# Patient Record
Sex: FEMALE | Race: WHITE | NOT HISPANIC OR LATINO | Employment: OTHER | URBAN - METROPOLITAN AREA
[De-identification: names, ages, dates, MRNs, and addresses within clinical notes are randomized per-mention and may not be internally consistent; named-entity substitution may affect disease eponyms.]

---

## 2024-01-01 ENCOUNTER — HOSPITAL ENCOUNTER (INPATIENT)
Facility: HOSPITAL | Age: 74
LOS: 7 days | End: 2024-12-27
Attending: INTERNAL MEDICINE | Admitting: INTERNAL MEDICINE
Payer: COMMERCIAL

## 2024-01-01 VITALS — OXYGEN SATURATION: 52 % | DIASTOLIC BLOOD PRESSURE: 84 MMHG | TEMPERATURE: 103.5 F | SYSTOLIC BLOOD PRESSURE: 125 MMHG

## 2024-01-01 PROCEDURE — 25010000002 LORAZEPAM PER 2 MG: Performed by: STUDENT IN AN ORGANIZED HEALTH CARE EDUCATION/TRAINING PROGRAM

## 2024-01-01 PROCEDURE — 25010000002 LORAZEPAM PER 2 MG: Performed by: HOSPITALIST

## 2024-01-01 PROCEDURE — 25010000002 HYDROMORPHONE PER 4 MG: Performed by: HOSPITALIST

## 2024-01-01 PROCEDURE — 25010000002 GLYCOPYRROLATE 0.2 MG/ML SOLUTION: Performed by: INTERNAL MEDICINE

## 2024-01-01 PROCEDURE — 25010000002 HYDROMORPHONE 1 MG/ML SOLUTION: Performed by: HOSPITALIST

## 2024-01-01 PROCEDURE — 25010000002 LORAZEPAM PER 2 MG: Performed by: INTERNAL MEDICINE

## 2024-01-01 PROCEDURE — 25010000002 MORPHINE PER 10 MG: Performed by: HOSPITALIST

## 2024-01-01 PROCEDURE — 99222 1ST HOSP IP/OBS MODERATE 55: CPT | Performed by: INTERNAL MEDICINE

## 2024-01-01 PROCEDURE — 25010000002 MORPHINE PER 10 MG: Performed by: INTERNAL MEDICINE

## 2024-01-01 PROCEDURE — 99239 HOSP IP/OBS DSCHRG MGMT >30: CPT | Performed by: INTERNAL MEDICINE

## 2024-01-01 PROCEDURE — 25010000002 HYDROMORPHONE PER 4 MG: Performed by: INTERNAL MEDICINE

## 2024-01-01 RX ORDER — MORPHINE SULFATE 20 MG/ML
10 SOLUTION ORAL
Status: DISCONTINUED | OUTPATIENT
Start: 2024-01-01 | End: 2024-01-01 | Stop reason: HOSPADM

## 2024-01-01 RX ORDER — GLYCOPYRROLATE 0.2 MG/ML
0.4 INJECTION INTRAMUSCULAR; INTRAVENOUS
Status: DISCONTINUED | OUTPATIENT
Start: 2024-01-01 | End: 2024-01-01 | Stop reason: HOSPADM

## 2024-01-01 RX ORDER — MORPHINE SULFATE 20 MG/ML
20 SOLUTION ORAL
Status: DISCONTINUED | OUTPATIENT
Start: 2024-01-01 | End: 2024-01-01 | Stop reason: HOSPADM

## 2024-01-01 RX ORDER — ACETAMINOPHEN 325 MG/1
650 TABLET ORAL EVERY 4 HOURS PRN
Status: DISCONTINUED | OUTPATIENT
Start: 2024-01-01 | End: 2024-01-01

## 2024-01-01 RX ORDER — LORAZEPAM 2 MG/ML
1 INJECTION INTRAMUSCULAR
Status: DISCONTINUED | OUTPATIENT
Start: 2024-01-01 | End: 2024-01-01

## 2024-01-01 RX ORDER — LORAZEPAM 2 MG/ML
1 CONCENTRATE ORAL
Status: ACTIVE | OUTPATIENT
Start: 2024-01-01 | End: 2024-01-01

## 2024-01-01 RX ORDER — MORPHINE SULFATE 4 MG/ML
4 INJECTION, SOLUTION INTRAMUSCULAR; INTRAVENOUS
Status: DISCONTINUED | OUTPATIENT
Start: 2024-01-01 | End: 2024-01-01

## 2024-01-01 RX ORDER — MORPHINE SULFATE 20 MG/ML
5 SOLUTION ORAL
Status: DISCONTINUED | OUTPATIENT
Start: 2024-01-01 | End: 2024-01-01

## 2024-01-01 RX ORDER — LORAZEPAM 2 MG/ML
2 CONCENTRATE ORAL
Status: DISCONTINUED | OUTPATIENT
Start: 2024-01-01 | End: 2024-01-01

## 2024-01-01 RX ORDER — ACETAMINOPHEN 650 MG/1
650 SUPPOSITORY RECTAL EVERY 4 HOURS PRN
Status: DISCONTINUED | OUTPATIENT
Start: 2024-01-01 | End: 2024-01-01 | Stop reason: HOSPADM

## 2024-01-01 RX ORDER — PROMETHAZINE HYDROCHLORIDE 25 MG/1
12.5 TABLET ORAL EVERY 4 HOURS PRN
Status: DISCONTINUED | OUTPATIENT
Start: 2024-01-01 | End: 2024-01-01

## 2024-01-01 RX ORDER — LORAZEPAM 2 MG/ML
2 CONCENTRATE ORAL
Status: DISCONTINUED | OUTPATIENT
Start: 2024-01-01 | End: 2024-01-01 | Stop reason: HOSPADM

## 2024-01-01 RX ORDER — QUETIAPINE FUMARATE 50 MG/1
200 TABLET, FILM COATED ORAL 2 TIMES DAILY
Status: DISCONTINUED | OUTPATIENT
Start: 2024-01-01 | End: 2024-01-01

## 2024-01-01 RX ORDER — MORPHINE SULFATE 20 MG/ML
5 SOLUTION ORAL
Status: DISCONTINUED | OUTPATIENT
Start: 2024-01-01 | End: 2024-01-01 | Stop reason: HOSPADM

## 2024-01-01 RX ORDER — MORPHINE SULFATE 2 MG/ML
2 INJECTION, SOLUTION INTRAMUSCULAR; INTRAVENOUS
Status: DISCONTINUED | OUTPATIENT
Start: 2024-01-01 | End: 2024-01-01

## 2024-01-01 RX ORDER — GLYCOPYRROLATE 0.2 MG/ML
0.4 INJECTION INTRAMUSCULAR; INTRAVENOUS
Status: DISCONTINUED | OUTPATIENT
Start: 2024-01-01 | End: 2024-01-01

## 2024-01-01 RX ORDER — MORPHINE SULFATE 20 MG/ML
20 SOLUTION ORAL
Status: DISCONTINUED | OUTPATIENT
Start: 2024-01-01 | End: 2024-01-01

## 2024-01-01 RX ORDER — LORAZEPAM 2 MG/ML
0.5 CONCENTRATE ORAL
Status: ACTIVE | OUTPATIENT
Start: 2024-01-01 | End: 2024-01-01

## 2024-01-01 RX ORDER — LORAZEPAM 2 MG/ML
2 INJECTION INTRAMUSCULAR
Status: ACTIVE | OUTPATIENT
Start: 2024-01-01 | End: 2024-01-01

## 2024-01-01 RX ORDER — HYDROMORPHONE HYDROCHLORIDE 2 MG/ML
2 INJECTION, SOLUTION INTRAMUSCULAR; INTRAVENOUS; SUBCUTANEOUS
Status: DISCONTINUED | OUTPATIENT
Start: 2024-01-01 | End: 2024-01-01

## 2024-01-01 RX ORDER — HYDROMORPHONE HYDROCHLORIDE 2 MG/ML
1.5 INJECTION, SOLUTION INTRAMUSCULAR; INTRAVENOUS; SUBCUTANEOUS
Status: DISCONTINUED | OUTPATIENT
Start: 2024-01-01 | End: 2024-01-01 | Stop reason: HOSPADM

## 2024-01-01 RX ORDER — LORAZEPAM 2 MG/ML
0.5 CONCENTRATE ORAL
Status: DISCONTINUED | OUTPATIENT
Start: 2024-01-01 | End: 2024-01-01

## 2024-01-01 RX ORDER — LORAZEPAM 2 MG/ML
1 CONCENTRATE ORAL
Status: DISCONTINUED | OUTPATIENT
Start: 2024-01-01 | End: 2024-01-01

## 2024-01-01 RX ORDER — LORAZEPAM 2 MG/ML
2 INJECTION INTRAMUSCULAR
Status: DISCONTINUED | OUTPATIENT
Start: 2024-01-01 | End: 2024-01-01 | Stop reason: HOSPADM

## 2024-01-01 RX ORDER — KETOROLAC TROMETHAMINE 15 MG/ML
15 INJECTION, SOLUTION INTRAMUSCULAR; INTRAVENOUS EVERY 6 HOURS PRN
Status: ACTIVE | OUTPATIENT
Start: 2024-01-01 | End: 2024-01-01

## 2024-01-01 RX ORDER — MORPHINE SULFATE 20 MG/ML
10 SOLUTION ORAL
Status: DISCONTINUED | OUTPATIENT
Start: 2024-01-01 | End: 2024-01-01

## 2024-01-01 RX ORDER — PROMETHAZINE HYDROCHLORIDE 12.5 MG/1
12.5 SUPPOSITORY RECTAL EVERY 4 HOURS PRN
Status: DISCONTINUED | OUTPATIENT
Start: 2024-01-01 | End: 2024-01-01

## 2024-01-01 RX ORDER — GLYCOPYRROLATE 0.2 MG/ML
0.2 INJECTION INTRAMUSCULAR; INTRAVENOUS
Status: DISCONTINUED | OUTPATIENT
Start: 2024-01-01 | End: 2024-01-01

## 2024-01-01 RX ORDER — LORAZEPAM 2 MG/ML
2 CONCENTRATE ORAL
Status: ACTIVE | OUTPATIENT
Start: 2024-01-01 | End: 2024-01-01

## 2024-01-01 RX ORDER — LORAZEPAM 2 MG/ML
2 INJECTION INTRAMUSCULAR
Status: DISCONTINUED | OUTPATIENT
Start: 2024-01-01 | End: 2024-01-01

## 2024-01-01 RX ORDER — GLYCOPYRROLATE 0.2 MG/ML
0.2 INJECTION INTRAMUSCULAR; INTRAVENOUS
Status: DISCONTINUED | OUTPATIENT
Start: 2024-01-01 | End: 2024-01-01 | Stop reason: HOSPADM

## 2024-01-01 RX ORDER — LORAZEPAM 2 MG/ML
0.5 INJECTION INTRAMUSCULAR
Status: DISPENSED | OUTPATIENT
Start: 2024-01-01 | End: 2024-01-01

## 2024-01-01 RX ORDER — MORPHINE SULFATE 10 MG/ML
6 INJECTION INTRAMUSCULAR; INTRAVENOUS; SUBCUTANEOUS
Status: DISCONTINUED | OUTPATIENT
Start: 2024-01-01 | End: 2024-01-01

## 2024-01-01 RX ORDER — HYDROMORPHONE HYDROCHLORIDE 1 MG/ML
0.5 INJECTION, SOLUTION INTRAMUSCULAR; INTRAVENOUS; SUBCUTANEOUS
Status: DISCONTINUED | OUTPATIENT
Start: 2024-01-01 | End: 2024-01-01

## 2024-01-01 RX ORDER — HYDROMORPHONE HYDROCHLORIDE 1 MG/ML
0.5 INJECTION, SOLUTION INTRAMUSCULAR; INTRAVENOUS; SUBCUTANEOUS
Status: DISCONTINUED | OUTPATIENT
Start: 2024-01-01 | End: 2024-01-01 | Stop reason: HOSPADM

## 2024-01-01 RX ORDER — DIPHENHYDRAMINE HYDROCHLORIDE 50 MG/ML
25 INJECTION INTRAMUSCULAR; INTRAVENOUS EVERY 6 HOURS PRN
Status: DISCONTINUED | OUTPATIENT
Start: 2024-01-01 | End: 2024-01-01

## 2024-01-01 RX ORDER — LORAZEPAM 2 MG/ML
0.5 INJECTION INTRAMUSCULAR
Status: DISCONTINUED | OUTPATIENT
Start: 2024-01-01 | End: 2024-01-01

## 2024-01-01 RX ORDER — HYDROMORPHONE HYDROCHLORIDE 2 MG/ML
1.5 INJECTION, SOLUTION INTRAMUSCULAR; INTRAVENOUS; SUBCUTANEOUS
Status: DISCONTINUED | OUTPATIENT
Start: 2024-01-01 | End: 2024-01-01

## 2024-01-01 RX ORDER — LORAZEPAM 2 MG/ML
0.5 CONCENTRATE ORAL
Status: DISCONTINUED | OUTPATIENT
Start: 2024-01-01 | End: 2024-01-01 | Stop reason: HOSPADM

## 2024-01-01 RX ORDER — DIPHENHYDRAMINE HCL 25 MG
25 CAPSULE ORAL EVERY 6 HOURS PRN
Status: DISCONTINUED | OUTPATIENT
Start: 2024-01-01 | End: 2024-01-01

## 2024-01-01 RX ORDER — LORAZEPAM 2 MG/ML
1 CONCENTRATE ORAL
Status: DISCONTINUED | OUTPATIENT
Start: 2024-01-01 | End: 2024-01-01 | Stop reason: HOSPADM

## 2024-01-01 RX ORDER — LORAZEPAM 2 MG/ML
1 INJECTION INTRAMUSCULAR
Status: DISCONTINUED | OUTPATIENT
Start: 2024-01-01 | End: 2024-01-01 | Stop reason: HOSPADM

## 2024-01-01 RX ORDER — LORAZEPAM 2 MG/ML
1 INJECTION INTRAMUSCULAR
Status: DISPENSED | OUTPATIENT
Start: 2024-01-01 | End: 2024-01-01

## 2024-01-01 RX ORDER — LIDOCAINE HYDROCHLORIDE 20 MG/ML
5 SOLUTION OROPHARYNGEAL EVERY 4 HOURS PRN
Status: DISCONTINUED | OUTPATIENT
Start: 2024-01-01 | End: 2024-01-01 | Stop reason: HOSPADM

## 2024-01-01 RX ORDER — SCOLOPAMINE TRANSDERMAL SYSTEM 1 MG/1
1 PATCH, EXTENDED RELEASE TRANSDERMAL
Status: DISCONTINUED | OUTPATIENT
Start: 2024-01-01 | End: 2024-01-01 | Stop reason: HOSPADM

## 2024-01-01 RX ORDER — LORAZEPAM 2 MG/ML
0.5 INJECTION INTRAMUSCULAR
Status: DISCONTINUED | OUTPATIENT
Start: 2024-01-01 | End: 2024-01-01 | Stop reason: HOSPADM

## 2024-01-01 RX ORDER — ACETAMINOPHEN 160 MG/5ML
650 SOLUTION ORAL EVERY 4 HOURS PRN
Status: DISCONTINUED | OUTPATIENT
Start: 2024-01-01 | End: 2024-01-01

## 2024-01-01 RX ORDER — DIPHENOXYLATE HYDROCHLORIDE AND ATROPINE SULFATE 2.5; .025 MG/1; MG/1
1 TABLET ORAL
Status: DISCONTINUED | OUTPATIENT
Start: 2024-01-01 | End: 2024-01-01

## 2024-01-01 RX ORDER — LORAZEPAM 2 MG/ML
1 INJECTION INTRAMUSCULAR
Status: ACTIVE | OUTPATIENT
Start: 2024-01-01 | End: 2024-01-01

## 2024-01-01 RX ORDER — LORAZEPAM 2 MG/ML
0.5 INJECTION INTRAMUSCULAR
Status: ACTIVE | OUTPATIENT
Start: 2024-01-01 | End: 2024-01-01

## 2024-01-01 RX ADMIN — LORAZEPAM 1 MG: 2 INJECTION INTRAMUSCULAR; INTRAVENOUS at 08:33

## 2024-01-01 RX ADMIN — HYDROMORPHONE HYDROCHLORIDE 1 MG: 1 INJECTION, SOLUTION INTRAMUSCULAR; INTRAVENOUS; SUBCUTANEOUS at 04:53

## 2024-01-01 RX ADMIN — LORAZEPAM 2 MG: 2 INJECTION INTRAMUSCULAR; INTRAVENOUS at 09:36

## 2024-01-01 RX ADMIN — LORAZEPAM 2 MG: 2 INJECTION INTRAMUSCULAR; INTRAVENOUS at 01:25

## 2024-01-01 RX ADMIN — LORAZEPAM 2 MG: 2 INJECTION INTRAMUSCULAR; INTRAVENOUS at 13:52

## 2024-01-01 RX ADMIN — LORAZEPAM 2 MG: 2 INJECTION INTRAMUSCULAR; INTRAVENOUS at 20:30

## 2024-01-01 RX ADMIN — LORAZEPAM 2 MG: 2 INJECTION INTRAMUSCULAR; INTRAVENOUS at 09:40

## 2024-01-01 RX ADMIN — HYDROMORPHONE HYDROCHLORIDE 1.5 MG: 2 INJECTION, SOLUTION INTRAMUSCULAR; INTRAVENOUS; SUBCUTANEOUS at 15:17

## 2024-01-01 RX ADMIN — LORAZEPAM 0.5 MG: 2 INJECTION INTRAMUSCULAR; INTRAVENOUS at 00:50

## 2024-01-01 RX ADMIN — LORAZEPAM 2 MG: 2 INJECTION INTRAMUSCULAR; INTRAVENOUS at 08:33

## 2024-01-01 RX ADMIN — LORAZEPAM 2 MG: 2 INJECTION INTRAMUSCULAR; INTRAVENOUS at 12:46

## 2024-01-01 RX ADMIN — HYDROMORPHONE HYDROCHLORIDE 0.5 MG: 1 INJECTION, SOLUTION INTRAMUSCULAR; INTRAVENOUS; SUBCUTANEOUS at 09:39

## 2024-01-01 RX ADMIN — LORAZEPAM 1 MG: 2 INJECTION INTRAMUSCULAR; INTRAVENOUS at 04:14

## 2024-01-01 RX ADMIN — LORAZEPAM 1 MG: 2 INJECTION INTRAMUSCULAR; INTRAVENOUS at 13:24

## 2024-01-01 RX ADMIN — MORPHINE SULFATE 4 MG: 4 INJECTION, SOLUTION INTRAMUSCULAR; INTRAVENOUS at 13:24

## 2024-01-01 RX ADMIN — MORPHINE SULFATE 4 MG: 4 INJECTION, SOLUTION INTRAMUSCULAR; INTRAVENOUS at 20:51

## 2024-01-01 RX ADMIN — LORAZEPAM 1 MG: 2 INJECTION INTRAMUSCULAR; INTRAVENOUS at 00:35

## 2024-01-01 RX ADMIN — HYDROMORPHONE HYDROCHLORIDE 1.5 MG: 2 INJECTION, SOLUTION INTRAMUSCULAR; INTRAVENOUS; SUBCUTANEOUS at 13:52

## 2024-01-01 RX ADMIN — LORAZEPAM 2 MG: 2 INJECTION INTRAMUSCULAR; INTRAVENOUS at 01:08

## 2024-01-01 RX ADMIN — HYDROMORPHONE HYDROCHLORIDE 1 MG: 1 INJECTION, SOLUTION INTRAMUSCULAR; INTRAVENOUS; SUBCUTANEOUS at 13:10

## 2024-01-01 RX ADMIN — HYDROMORPHONE HYDROCHLORIDE 0.5 MG: 1 INJECTION, SOLUTION INTRAMUSCULAR; INTRAVENOUS; SUBCUTANEOUS at 09:36

## 2024-01-01 RX ADMIN — LORAZEPAM 2 MG: 2 INJECTION INTRAMUSCULAR; INTRAVENOUS at 09:39

## 2024-01-01 RX ADMIN — LORAZEPAM 0.5 MG: 2 INJECTION INTRAMUSCULAR; INTRAVENOUS at 17:49

## 2024-01-01 RX ADMIN — LORAZEPAM 2 MG: 2 INJECTION INTRAMUSCULAR; INTRAVENOUS at 13:09

## 2024-01-01 RX ADMIN — MORPHINE SULFATE 4 MG: 4 INJECTION, SOLUTION INTRAMUSCULAR; INTRAVENOUS at 04:14

## 2024-01-01 RX ADMIN — LORAZEPAM 2 MG: 2 INJECTION INTRAMUSCULAR; INTRAVENOUS at 15:18

## 2024-01-01 RX ADMIN — LORAZEPAM 2 MG: 2 INJECTION INTRAMUSCULAR; INTRAVENOUS at 04:54

## 2024-01-01 RX ADMIN — LORAZEPAM 0.5 MG: 2 INJECTION INTRAMUSCULAR; INTRAVENOUS at 20:43

## 2024-01-01 RX ADMIN — HYDROMORPHONE HYDROCHLORIDE 1 MG: 1 INJECTION, SOLUTION INTRAMUSCULAR; INTRAVENOUS; SUBCUTANEOUS at 09:04

## 2024-01-01 RX ADMIN — HYDROMORPHONE HYDROCHLORIDE 0.5 MG: 1 INJECTION, SOLUTION INTRAMUSCULAR; INTRAVENOUS; SUBCUTANEOUS at 04:55

## 2024-01-01 RX ADMIN — HYDROMORPHONE HYDROCHLORIDE 1 MG: 1 INJECTION, SOLUTION INTRAMUSCULAR; INTRAVENOUS; SUBCUTANEOUS at 12:25

## 2024-01-01 RX ADMIN — HYDROMORPHONE HYDROCHLORIDE 1 MG: 1 INJECTION, SOLUTION INTRAMUSCULAR; INTRAVENOUS; SUBCUTANEOUS at 01:24

## 2024-01-01 RX ADMIN — HYDROMORPHONE HYDROCHLORIDE 0.5 MG: 1 INJECTION, SOLUTION INTRAMUSCULAR; INTRAVENOUS; SUBCUTANEOUS at 16:45

## 2024-01-01 RX ADMIN — HYDROMORPHONE HYDROCHLORIDE 1 MG: 1 INJECTION, SOLUTION INTRAMUSCULAR; INTRAVENOUS; SUBCUTANEOUS at 00:22

## 2024-01-01 RX ADMIN — LORAZEPAM 2 MG: 2 INJECTION INTRAMUSCULAR; INTRAVENOUS at 06:23

## 2024-01-01 RX ADMIN — MORPHINE SULFATE 4 MG: 4 INJECTION, SOLUTION INTRAMUSCULAR; INTRAVENOUS at 17:43

## 2024-01-01 RX ADMIN — HYDROMORPHONE HYDROCHLORIDE 1 MG: 1 INJECTION, SOLUTION INTRAMUSCULAR; INTRAVENOUS; SUBCUTANEOUS at 16:54

## 2024-01-01 RX ADMIN — LORAZEPAM 2 MG: 2 INJECTION INTRAMUSCULAR; INTRAVENOUS at 00:22

## 2024-01-01 RX ADMIN — LORAZEPAM 2 MG: 2 INJECTION INTRAMUSCULAR; INTRAVENOUS at 12:39

## 2024-01-01 RX ADMIN — HYDROMORPHONE HYDROCHLORIDE 1 MG: 1 INJECTION, SOLUTION INTRAMUSCULAR; INTRAVENOUS; SUBCUTANEOUS at 01:08

## 2024-01-01 RX ADMIN — MORPHINE SULFATE 2 MG: 2 INJECTION, SOLUTION INTRAMUSCULAR; INTRAVENOUS at 17:49

## 2024-01-01 RX ADMIN — HYDROMORPHONE HYDROCHLORIDE 0.5 MG: 1 INJECTION, SOLUTION INTRAMUSCULAR; INTRAVENOUS; SUBCUTANEOUS at 12:39

## 2024-01-01 RX ADMIN — HYDROMORPHONE HYDROCHLORIDE 1 MG: 1 INJECTION, SOLUTION INTRAMUSCULAR; INTRAVENOUS; SUBCUTANEOUS at 20:46

## 2024-01-01 RX ADMIN — HYDROMORPHONE HYDROCHLORIDE 1 MG: 1 INJECTION, SOLUTION INTRAMUSCULAR; INTRAVENOUS; SUBCUTANEOUS at 05:06

## 2024-01-01 RX ADMIN — MORPHINE SULFATE 4 MG: 4 INJECTION, SOLUTION INTRAMUSCULAR; INTRAVENOUS at 08:53

## 2024-01-01 RX ADMIN — LORAZEPAM 2 MG: 2 INJECTION INTRAMUSCULAR; INTRAVENOUS at 17:43

## 2024-01-01 RX ADMIN — LORAZEPAM 2 MG: 2 INJECTION INTRAMUSCULAR; INTRAVENOUS at 16:45

## 2024-01-01 RX ADMIN — LORAZEPAM 2 MG: 2 INJECTION INTRAMUSCULAR; INTRAVENOUS at 09:03

## 2024-01-01 RX ADMIN — HYDROMORPHONE HYDROCHLORIDE 1 MG: 1 INJECTION, SOLUTION INTRAMUSCULAR; INTRAVENOUS; SUBCUTANEOUS at 09:39

## 2024-01-01 RX ADMIN — LORAZEPAM 1 MG: 2 INJECTION INTRAMUSCULAR; INTRAVENOUS at 17:43

## 2024-01-01 RX ADMIN — LORAZEPAM 2 MG: 2 INJECTION INTRAMUSCULAR; INTRAVENOUS at 00:55

## 2024-01-01 RX ADMIN — LORAZEPAM 2 MG: 2 INJECTION INTRAMUSCULAR; INTRAVENOUS at 05:05

## 2024-01-01 RX ADMIN — HYDROMORPHONE HYDROCHLORIDE 0.5 MG: 1 INJECTION, SOLUTION INTRAMUSCULAR; INTRAVENOUS; SUBCUTANEOUS at 18:26

## 2024-01-01 RX ADMIN — HYDROMORPHONE HYDROCHLORIDE 0.5 MG: 1 INJECTION, SOLUTION INTRAMUSCULAR; INTRAVENOUS; SUBCUTANEOUS at 00:55

## 2024-01-01 RX ADMIN — MORPHINE SULFATE 4 MG: 4 INJECTION, SOLUTION INTRAMUSCULAR; INTRAVENOUS at 12:35

## 2024-01-01 RX ADMIN — LORAZEPAM 1 MG: 2 INJECTION INTRAMUSCULAR; INTRAVENOUS at 05:23

## 2024-01-01 RX ADMIN — LORAZEPAM 2 MG: 2 INJECTION INTRAMUSCULAR; INTRAVENOUS at 00:45

## 2024-01-01 RX ADMIN — HYDROMORPHONE HYDROCHLORIDE 0.5 MG: 1 INJECTION, SOLUTION INTRAMUSCULAR; INTRAVENOUS; SUBCUTANEOUS at 00:45

## 2024-01-01 RX ADMIN — HYDROMORPHONE HYDROCHLORIDE 1 MG: 1 INJECTION, SOLUTION INTRAMUSCULAR; INTRAVENOUS; SUBCUTANEOUS at 08:33

## 2024-01-01 RX ADMIN — HYDROMORPHONE HYDROCHLORIDE 1 MG: 1 INJECTION, SOLUTION INTRAMUSCULAR; INTRAVENOUS; SUBCUTANEOUS at 12:46

## 2024-01-01 RX ADMIN — GLYCOPYRROLATE 0.4 MG: 0.2 INJECTION INTRAMUSCULAR; INTRAVENOUS at 09:04

## 2024-01-01 RX ADMIN — MORPHINE SULFATE 4 MG: 4 INJECTION, SOLUTION INTRAMUSCULAR; INTRAVENOUS at 05:23

## 2024-01-01 RX ADMIN — LORAZEPAM 2 MG: 2 INJECTION INTRAMUSCULAR; INTRAVENOUS at 04:47

## 2024-01-01 RX ADMIN — MORPHINE SULFATE 4 MG: 4 INJECTION, SOLUTION INTRAMUSCULAR; INTRAVENOUS at 08:34

## 2024-01-01 RX ADMIN — LORAZEPAM 2 MG: 2 INJECTION INTRAMUSCULAR; INTRAVENOUS at 12:35

## 2024-01-01 RX ADMIN — LORAZEPAM 2 MG: 2 INJECTION INTRAMUSCULAR; INTRAVENOUS at 20:36

## 2024-01-01 RX ADMIN — LORAZEPAM 2 MG: 2 INJECTION INTRAMUSCULAR; INTRAVENOUS at 04:53

## 2024-01-01 RX ADMIN — GLYCOPYRROLATE 0.4 MG: 0.2 INJECTION INTRAMUSCULAR; INTRAVENOUS at 13:10

## 2024-01-01 RX ADMIN — LORAZEPAM 2 MG: 2 INJECTION INTRAMUSCULAR; INTRAVENOUS at 20:46

## 2024-01-01 RX ADMIN — GLYCOPYRROLATE 0.4 MG: 0.2 INJECTION INTRAMUSCULAR; INTRAVENOUS at 04:53

## 2024-01-01 RX ADMIN — MORPHINE SULFATE 2 MG: 2 INJECTION, SOLUTION INTRAMUSCULAR; INTRAVENOUS at 20:43

## 2024-01-01 RX ADMIN — HYDROMORPHONE HYDROCHLORIDE 0.5 MG: 1 INJECTION, SOLUTION INTRAMUSCULAR; INTRAVENOUS; SUBCUTANEOUS at 20:48

## 2024-01-01 RX ADMIN — HYDROMORPHONE HYDROCHLORIDE 1 MG: 1 INJECTION, SOLUTION INTRAMUSCULAR; INTRAVENOUS; SUBCUTANEOUS at 20:30

## 2024-01-01 RX ADMIN — HYDROMORPHONE HYDROCHLORIDE 2 MG: 2 INJECTION, SOLUTION INTRAMUSCULAR; INTRAVENOUS; SUBCUTANEOUS at 10:33

## 2024-01-01 RX ADMIN — LORAZEPAM 2 MG: 2 INJECTION INTRAMUSCULAR; INTRAVENOUS at 12:25

## 2024-01-01 RX ADMIN — LORAZEPAM 2 MG: 2 INJECTION INTRAMUSCULAR; INTRAVENOUS at 08:53

## 2024-01-01 RX ADMIN — LORAZEPAM 2 MG: 2 INJECTION INTRAMUSCULAR; INTRAVENOUS at 17:35

## 2024-01-01 RX ADMIN — GLYCOPYRROLATE 0.4 MG: 0.2 INJECTION INTRAMUSCULAR; INTRAVENOUS at 00:22

## 2024-01-01 RX ADMIN — GLYCOPYRROLATE 0.4 MG: 0.2 INJECTION INTRAMUSCULAR; INTRAVENOUS at 13:53

## 2024-01-01 RX ADMIN — GLYCOPYRROLATE 0.4 MG: 0.2 INJECTION INTRAMUSCULAR; INTRAVENOUS at 08:33

## 2024-01-01 RX ADMIN — MORPHINE SULFATE 4 MG: 4 INJECTION, SOLUTION INTRAMUSCULAR; INTRAVENOUS at 00:35

## 2024-01-01 RX ADMIN — HYDROMORPHONE HYDROCHLORIDE 0.5 MG: 1 INJECTION, SOLUTION INTRAMUSCULAR; INTRAVENOUS; SUBCUTANEOUS at 04:47

## 2024-01-01 RX ADMIN — LORAZEPAM 2 MG: 2 INJECTION INTRAMUSCULAR; INTRAVENOUS at 20:48

## 2024-01-01 RX ADMIN — GLYCOPYRROLATE 0.4 MG: 0.2 INJECTION INTRAMUSCULAR; INTRAVENOUS at 01:08

## 2024-01-01 RX ADMIN — LORAZEPAM 2 MG: 2 INJECTION INTRAMUSCULAR; INTRAVENOUS at 18:26

## 2024-01-01 RX ADMIN — HYDROMORPHONE HYDROCHLORIDE 1 MG: 1 INJECTION, SOLUTION INTRAMUSCULAR; INTRAVENOUS; SUBCUTANEOUS at 17:43

## 2024-01-01 RX ADMIN — HYDROMORPHONE HYDROCHLORIDE 1 MG: 1 INJECTION, SOLUTION INTRAMUSCULAR; INTRAVENOUS; SUBCUTANEOUS at 20:36

## 2024-01-01 RX ADMIN — MORPHINE SULFATE 2 MG: 2 INJECTION, SOLUTION INTRAMUSCULAR; INTRAVENOUS at 00:50

## 2024-01-01 RX ADMIN — LORAZEPAM 2 MG: 2 INJECTION INTRAMUSCULAR; INTRAVENOUS at 16:54

## 2024-01-01 RX ADMIN — HYDROMORPHONE HYDROCHLORIDE 1 MG: 1 INJECTION, SOLUTION INTRAMUSCULAR; INTRAVENOUS; SUBCUTANEOUS at 17:35

## 2024-01-01 RX ADMIN — LORAZEPAM 1 MG: 2 INJECTION INTRAMUSCULAR; INTRAVENOUS at 20:50

## 2024-01-01 RX ADMIN — LORAZEPAM 2 MG: 2 INJECTION INTRAMUSCULAR; INTRAVENOUS at 10:33

## 2024-02-14 ENCOUNTER — TRANSCRIBE ORDERS (OUTPATIENT)
Dept: ADMINISTRATIVE | Facility: HOSPITAL | Age: 74
End: 2024-02-14

## 2024-02-14 DIAGNOSIS — Z78.0 POST-MENOPAUSAL: Primary | ICD-10-CM

## 2024-07-11 ENCOUNTER — HOSPITAL ENCOUNTER (OUTPATIENT)
Dept: BONE DENSITY | Facility: HOSPITAL | Age: 74
Discharge: HOME | End: 2024-07-11
Payer: MEDICARE

## 2024-07-11 DIAGNOSIS — Z78.0 POST-MENOPAUSAL: ICD-10-CM

## 2024-12-10 ENCOUNTER — APPOINTMENT (OUTPATIENT)
Dept: GENERAL RADIOLOGY | Facility: HOSPITAL | Age: 74
DRG: 871 | End: 2024-12-10
Payer: MEDICARE

## 2024-12-10 ENCOUNTER — HOSPITAL ENCOUNTER (INPATIENT)
Facility: HOSPITAL | Age: 74
LOS: 10 days | Discharge: NURSING, REHAB OR LONG-TERM CARE | DRG: 871 | End: 2024-12-20
Attending: EMERGENCY MEDICINE | Admitting: HOSPITALIST
Payer: MEDICARE

## 2024-12-10 ENCOUNTER — APPOINTMENT (OUTPATIENT)
Dept: CT IMAGING | Facility: HOSPITAL | Age: 74
DRG: 871 | End: 2024-12-10
Payer: MEDICARE

## 2024-12-10 DIAGNOSIS — R79.89 ELEVATED BRAIN NATRIURETIC PEPTIDE (BNP) LEVEL: ICD-10-CM

## 2024-12-10 DIAGNOSIS — J98.59 MEDIASTINAL MASS: ICD-10-CM

## 2024-12-10 DIAGNOSIS — I26.99 OTHER ACUTE PULMONARY EMBOLISM, UNSPECIFIED WHETHER ACUTE COR PULMONALE PRESENT: ICD-10-CM

## 2024-12-10 DIAGNOSIS — R09.02 HYPOXIA: ICD-10-CM

## 2024-12-10 DIAGNOSIS — C38.1 MALIGNANT NEOPLASM OF ANTERIOR MEDIASTINUM: ICD-10-CM

## 2024-12-10 DIAGNOSIS — J15.7 PNEUMONIA OF BOTH LUNGS DUE TO MYCOPLASMA PNEUMONIAE, UNSPECIFIED PART OF LUNG: Primary | ICD-10-CM

## 2024-12-10 PROBLEM — J18.9 PNEUMONIA: Status: ACTIVE | Noted: 2024-12-10

## 2024-12-10 LAB
ALBUMIN SERPL-MCNC: 2.7 G/DL (ref 3.5–5.2)
ALBUMIN/GLOB SERPL: 0.5 G/DL
ALP SERPL-CCNC: 183 U/L (ref 39–117)
ALT SERPL W P-5'-P-CCNC: 39 U/L (ref 1–33)
ANION GAP SERPL CALCULATED.3IONS-SCNC: 13.2 MMOL/L (ref 5–15)
AST SERPL-CCNC: 41 U/L (ref 1–32)
B PARAPERT DNA SPEC QL NAA+PROBE: NOT DETECTED
B PERT DNA SPEC QL NAA+PROBE: NOT DETECTED
BASOPHILS # BLD AUTO: 0.05 10*3/MM3 (ref 0–0.2)
BASOPHILS NFR BLD AUTO: 0.3 % (ref 0–1.5)
BILIRUB SERPL-MCNC: 0.6 MG/DL (ref 0–1.2)
BUN SERPL-MCNC: 18 MG/DL (ref 8–23)
BUN/CREAT SERPL: 17.8 (ref 7–25)
C PNEUM DNA NPH QL NAA+NON-PROBE: NOT DETECTED
CALCIUM SPEC-SCNC: 8.1 MG/DL (ref 8.6–10.5)
CHLORIDE SERPL-SCNC: 99 MMOL/L (ref 98–107)
CO2 SERPL-SCNC: 22.8 MMOL/L (ref 22–29)
CREAT SERPL-MCNC: 1.01 MG/DL (ref 0.57–1)
D DIMER PPP FEU-MCNC: 2.44 MCGFEU/ML (ref 0–0.75)
D-LACTATE SERPL-SCNC: 1.3 MMOL/L (ref 0.5–2)
D-LACTATE SERPL-SCNC: 2.8 MMOL/L (ref 0.5–2)
DEPRECATED RDW RBC AUTO: 42.2 FL (ref 37–54)
EGFRCR SERPLBLD CKD-EPI 2021: 58.2 ML/MIN/1.73
EOSINOPHIL # BLD AUTO: 0.01 10*3/MM3 (ref 0–0.4)
EOSINOPHIL NFR BLD AUTO: 0.1 % (ref 0.3–6.2)
ERYTHROCYTE [DISTWIDTH] IN BLOOD BY AUTOMATED COUNT: 13.8 % (ref 12.3–15.4)
FLUAV SUBTYP SPEC NAA+PROBE: NOT DETECTED
FLUBV RNA ISLT QL NAA+PROBE: NOT DETECTED
GLOBULIN UR ELPH-MCNC: 5.1 GM/DL
GLUCOSE SERPL-MCNC: 176 MG/DL (ref 65–99)
HADV DNA SPEC NAA+PROBE: NOT DETECTED
HCOV 229E RNA SPEC QL NAA+PROBE: NOT DETECTED
HCOV HKU1 RNA SPEC QL NAA+PROBE: NOT DETECTED
HCOV NL63 RNA SPEC QL NAA+PROBE: NOT DETECTED
HCOV OC43 RNA SPEC QL NAA+PROBE: NOT DETECTED
HCT VFR BLD AUTO: 29.8 % (ref 34–46.6)
HGB BLD-MCNC: 10.3 G/DL (ref 12–15.9)
HMPV RNA NPH QL NAA+NON-PROBE: NOT DETECTED
HPIV1 RNA ISLT QL NAA+PROBE: NOT DETECTED
HPIV2 RNA SPEC QL NAA+PROBE: NOT DETECTED
HPIV3 RNA NPH QL NAA+PROBE: NOT DETECTED
HPIV4 P GENE NPH QL NAA+PROBE: NOT DETECTED
IMM GRANULOCYTES # BLD AUTO: 0.31 10*3/MM3 (ref 0–0.05)
IMM GRANULOCYTES NFR BLD AUTO: 1.7 % (ref 0–0.5)
LIPASE SERPL-CCNC: 15 U/L (ref 13–60)
LYMPHOCYTES # BLD AUTO: 0.56 10*3/MM3 (ref 0.7–3.1)
LYMPHOCYTES NFR BLD AUTO: 3 % (ref 19.6–45.3)
M PNEUMO IGG SER IA-ACNC: DETECTED
MAGNESIUM SERPL-MCNC: 2.3 MG/DL (ref 1.6–2.4)
MCH RBC QN AUTO: 29 PG (ref 26.6–33)
MCHC RBC AUTO-ENTMCNC: 34.6 G/DL (ref 31.5–35.7)
MCV RBC AUTO: 83.9 FL (ref 79–97)
MONOCYTES # BLD AUTO: 0.75 10*3/MM3 (ref 0.1–0.9)
MONOCYTES NFR BLD AUTO: 4 % (ref 5–12)
NEUTROPHILS NFR BLD AUTO: 16.9 10*3/MM3 (ref 1.7–7)
NEUTROPHILS NFR BLD AUTO: 90.9 % (ref 42.7–76)
NRBC BLD AUTO-RTO: 0 /100 WBC (ref 0–0.2)
NT-PROBNP SERPL-MCNC: ABNORMAL PG/ML (ref 0–1800)
PLATELET # BLD AUTO: 602 10*3/MM3 (ref 140–450)
PMV BLD AUTO: 9.7 FL (ref 6–12)
POTASSIUM SERPL-SCNC: 3.5 MMOL/L (ref 3.5–5.2)
PROCALCITONIN SERPL-MCNC: 1.94 NG/ML (ref 0–0.25)
PROT SERPL-MCNC: 7.8 G/DL (ref 6–8.5)
RBC # BLD AUTO: 3.55 10*6/MM3 (ref 3.77–5.28)
RHINOVIRUS RNA SPEC NAA+PROBE: NOT DETECTED
RSV RNA NPH QL NAA+NON-PROBE: NOT DETECTED
SARS-COV-2 RNA NPH QL NAA+NON-PROBE: NOT DETECTED
SODIUM SERPL-SCNC: 135 MMOL/L (ref 136–145)
TROPONIN T SERPL HS-MCNC: 48 NG/L
WBC NRBC COR # BLD AUTO: 18.58 10*3/MM3 (ref 3.4–10.8)

## 2024-12-10 PROCEDURE — 83690 ASSAY OF LIPASE: CPT | Performed by: EMERGENCY MEDICINE

## 2024-12-10 PROCEDURE — 80053 COMPREHEN METABOLIC PANEL: CPT | Performed by: EMERGENCY MEDICINE

## 2024-12-10 PROCEDURE — 85025 COMPLETE CBC W/AUTO DIFF WBC: CPT | Performed by: EMERGENCY MEDICINE

## 2024-12-10 PROCEDURE — 83605 ASSAY OF LACTIC ACID: CPT | Performed by: EMERGENCY MEDICINE

## 2024-12-10 PROCEDURE — 25510000001 IOPAMIDOL PER 1 ML: Performed by: EMERGENCY MEDICINE

## 2024-12-10 PROCEDURE — 84145 PROCALCITONIN (PCT): CPT | Performed by: EMERGENCY MEDICINE

## 2024-12-10 PROCEDURE — 71045 X-RAY EXAM CHEST 1 VIEW: CPT

## 2024-12-10 PROCEDURE — 71275 CT ANGIOGRAPHY CHEST: CPT

## 2024-12-10 PROCEDURE — 84484 ASSAY OF TROPONIN QUANT: CPT | Performed by: EMERGENCY MEDICINE

## 2024-12-10 PROCEDURE — 36415 COLL VENOUS BLD VENIPUNCTURE: CPT

## 2024-12-10 PROCEDURE — 93005 ELECTROCARDIOGRAM TRACING: CPT | Performed by: EMERGENCY MEDICINE

## 2024-12-10 PROCEDURE — 25010000002 ENOXAPARIN PER 10 MG: Performed by: EMERGENCY MEDICINE

## 2024-12-10 PROCEDURE — 83880 ASSAY OF NATRIURETIC PEPTIDE: CPT | Performed by: EMERGENCY MEDICINE

## 2024-12-10 PROCEDURE — 99291 CRITICAL CARE FIRST HOUR: CPT

## 2024-12-10 PROCEDURE — 93010 ELECTROCARDIOGRAM REPORT: CPT | Performed by: INTERNAL MEDICINE

## 2024-12-10 PROCEDURE — 85379 FIBRIN DEGRADATION QUANT: CPT | Performed by: EMERGENCY MEDICINE

## 2024-12-10 PROCEDURE — 83735 ASSAY OF MAGNESIUM: CPT | Performed by: EMERGENCY MEDICINE

## 2024-12-10 PROCEDURE — 0202U NFCT DS 22 TRGT SARS-COV-2: CPT | Performed by: EMERGENCY MEDICINE

## 2024-12-10 RX ORDER — IOPAMIDOL 755 MG/ML
100 INJECTION, SOLUTION INTRAVASCULAR
Status: COMPLETED | OUTPATIENT
Start: 2024-12-10 | End: 2024-12-10

## 2024-12-10 RX ORDER — ENOXAPARIN SODIUM 100 MG/ML
1 INJECTION SUBCUTANEOUS ONCE
Status: COMPLETED | OUTPATIENT
Start: 2024-12-10 | End: 2024-12-10

## 2024-12-10 RX ORDER — AZITHROMYCIN 250 MG/1
500 TABLET, FILM COATED ORAL ONCE
Status: COMPLETED | OUTPATIENT
Start: 2024-12-10 | End: 2024-12-10

## 2024-12-10 RX ORDER — PANTOPRAZOLE SODIUM 40 MG/1
40 TABLET, DELAYED RELEASE ORAL DAILY
COMMUNITY

## 2024-12-10 RX ORDER — ALENDRONATE SODIUM 70 MG/1
70 TABLET ORAL
COMMUNITY

## 2024-12-10 RX ORDER — LEVOCETIRIZINE DIHYDROCHLORIDE 5 MG/1
5 TABLET, FILM COATED ORAL EVERY EVENING
COMMUNITY

## 2024-12-10 RX ORDER — QUETIAPINE FUMARATE 200 MG/1
200 TABLET, FILM COATED ORAL 2 TIMES DAILY
COMMUNITY

## 2024-12-10 RX ADMIN — IOPAMIDOL 55 ML: 755 INJECTION, SOLUTION INTRAVENOUS at 19:30

## 2024-12-10 RX ADMIN — ENOXAPARIN SODIUM 50 MG: 100 INJECTION SUBCUTANEOUS at 21:03

## 2024-12-10 RX ADMIN — AZITHROMYCIN 500 MG: 250 TABLET, FILM COATED ORAL at 19:11

## 2024-12-10 NOTE — ED PROVIDER NOTES
EMERGENCY DEPARTMENT ENCOUNTER    Room Number:  25/25  PCP: Provider, No Known  Historian: Patient, caregiver      HPI:  Chief Complaint: Cough, shortness of breath  A complete HPI/ROS/PMH/PSH/SH/FH are unobtainable due to: None    Context: Alanna Machuca is a 74 y.o. female who presents to the ED via private vehicle from doctor's office c/o acute cough and shortness of breath since yesterday.  Was found to be hypoxic at doctor's office today in the 80s, requiring supplemental O2 to get her into the 90s.  Patient is had a cough since yesterday, no significant chest pain.  No reported fevers, no vomiting or diarrhea.  Caregiver states has a remote history of CHF but not really anything since then.  Patient has a history of schizophrenia.      MEDICAL RECORD REVIEW    External (non-ED) record review: No prior cardiac workup noted on chart review in epic              PAST MEDICAL HISTORY  Active Ambulatory Problems     Diagnosis Date Noted    No Active Ambulatory Problems     Resolved Ambulatory Problems     Diagnosis Date Noted    No Resolved Ambulatory Problems     No Additional Past Medical History         PAST SURGICAL HISTORY  No past surgical history on file.      FAMILY HISTORY  No family history on file.      SOCIAL HISTORY  Social History     Socioeconomic History    Marital status: Single         ALLERGIES  Patient has no known allergies.        REVIEW OF SYSTEMS  Review of Systems     All systems reviewed and negative except for those discussed in HPI.       PHYSICAL EXAM    I have reviewed the triage vital signs and nursing notes.    ED Triage Vitals   Temp Heart Rate Resp BP SpO2   12/10/24 1703 12/10/24 1703 12/10/24 1708 12/10/24 1708 12/10/24 1703   97.7 °F (36.5 °C) (!) 146 22 114/75 (!) 85 %      Temp src Heart Rate Source Patient Position BP Location FiO2 (%)   12/10/24 1703 12/10/24 1703 -- -- --   Tympanic Monitor          Physical Exam  General: No acute distress, nontoxic  HEENT: Mucous  membranes moist, atraumatic, EOMI  Neck: Full ROM  Pulm: Symmetric chest rise, mild, lungs slightly diminished bilaterally with scattered rales  Cardiovascular: Regular rhythm tachycardia, intact distal pulses, no significant peripheral edema  GI: Soft, nontender, nondistended, no rebound, no guarding, bowel sounds present  MSK: Full ROM, no deformity  Skin: Warm, dry  Neuro: Awake, alert, oriented x 4, GCS 15, moving all extremities, no focal deficits  Psych: Calm, cooperative      I wore an N95 mask, eye protection, and gloves used during this encounter.       LAB RESULTS  Recent Results (from the past 24 hours)   ECG 12 Lead Tachycardia    Collection Time: 12/10/24  5:13 PM   Result Value Ref Range    QT Interval 269 ms    QTC Interval 420 ms   Comprehensive Metabolic Panel    Collection Time: 12/10/24  5:14 PM    Specimen: Blood   Result Value Ref Range    Glucose 176 (H) 65 - 99 mg/dL    BUN 18 8 - 23 mg/dL    Creatinine 1.01 (H) 0.57 - 1.00 mg/dL    Sodium 135 (L) 136 - 145 mmol/L    Potassium 3.5 3.5 - 5.2 mmol/L    Chloride 99 98 - 107 mmol/L    CO2 22.8 22.0 - 29.0 mmol/L    Calcium 8.1 (L) 8.6 - 10.5 mg/dL    Total Protein 7.8 6.0 - 8.5 g/dL    Albumin 2.7 (L) 3.5 - 5.2 g/dL    ALT (SGPT) 39 (H) 1 - 33 U/L    AST (SGOT) 41 (H) 1 - 32 U/L    Alkaline Phosphatase 183 (H) 39 - 117 U/L    Total Bilirubin 0.6 0.0 - 1.2 mg/dL    Globulin 5.1 gm/dL    A/G Ratio 0.5 g/dL    BUN/Creatinine Ratio 17.8 7.0 - 25.0    Anion Gap 13.2 5.0 - 15.0 mmol/L    eGFR 58.2 (L) >60.0 mL/min/1.73   Lipase    Collection Time: 12/10/24  5:14 PM    Specimen: Blood   Result Value Ref Range    Lipase 15 13 - 60 U/L   BNP    Collection Time: 12/10/24  5:14 PM    Specimen: Blood   Result Value Ref Range    proBNP 10,957.0 (H) 0.0 - 1,800.0 pg/mL   D-dimer, Quantitative    Collection Time: 12/10/24  5:14 PM    Specimen: Blood   Result Value Ref Range    D-Dimer, Quantitative 2.44 (H) 0.00 - 0.75 MCGFEU/mL   High Sensitivity Troponin T     Collection Time: 12/10/24  5:14 PM    Specimen: Blood   Result Value Ref Range    HS Troponin T 48 (H) <14 ng/L   Procalcitonin    Collection Time: 12/10/24  5:14 PM    Specimen: Blood   Result Value Ref Range    Procalcitonin 1.94 (H) 0.00 - 0.25 ng/mL   Lactic Acid, Plasma    Collection Time: 12/10/24  5:14 PM    Specimen: Blood   Result Value Ref Range    Lactate 2.8 (C) 0.5 - 2.0 mmol/L   Magnesium    Collection Time: 12/10/24  5:14 PM    Specimen: Blood   Result Value Ref Range    Magnesium 2.3 1.6 - 2.4 mg/dL   CBC Auto Differential    Collection Time: 12/10/24  5:14 PM    Specimen: Blood   Result Value Ref Range    WBC 18.58 (H) 3.40 - 10.80 10*3/mm3    RBC 3.55 (L) 3.77 - 5.28 10*6/mm3    Hemoglobin 10.3 (L) 12.0 - 15.9 g/dL    Hematocrit 29.8 (L) 34.0 - 46.6 %    MCV 83.9 79.0 - 97.0 fL    MCH 29.0 26.6 - 33.0 pg    MCHC 34.6 31.5 - 35.7 g/dL    RDW 13.8 12.3 - 15.4 %    RDW-SD 42.2 37.0 - 54.0 fl    MPV 9.7 6.0 - 12.0 fL    Platelets 602 (H) 140 - 450 10*3/mm3    Neutrophil % 90.9 (H) 42.7 - 76.0 %    Lymphocyte % 3.0 (L) 19.6 - 45.3 %    Monocyte % 4.0 (L) 5.0 - 12.0 %    Eosinophil % 0.1 (L) 0.3 - 6.2 %    Basophil % 0.3 0.0 - 1.5 %    Immature Grans % 1.7 (H) 0.0 - 0.5 %    Neutrophils, Absolute 16.90 (H) 1.70 - 7.00 10*3/mm3    Lymphocytes, Absolute 0.56 (L) 0.70 - 3.10 10*3/mm3    Monocytes, Absolute 0.75 0.10 - 0.90 10*3/mm3    Eosinophils, Absolute 0.01 0.00 - 0.40 10*3/mm3    Basophils, Absolute 0.05 0.00 - 0.20 10*3/mm3    Immature Grans, Absolute 0.31 (H) 0.00 - 0.05 10*3/mm3    nRBC 0.0 0.0 - 0.2 /100 WBC   Respiratory Panel PCR w/COVID-19(SARS-CoV-2) JEROMY/[facility]K/DONALDO/PAD/COR/DANA In-House, NP Swab in UTM/VTM, 2 HR TAT - Swab, Nasopharynx    Collection Time: 12/10/24  5:16 PM    Specimen: Nasopharynx; Swab   Result Value Ref Range    ADENOVIRUS, PCR Not Detected Not Detected    Coronavirus 229E Not Detected Not Detected    Coronavirus HKU1 Not Detected Not Detected    Coronavirus NL63 Not Detected Not  Detected    Coronavirus OC43 Not Detected Not Detected    COVID19 Not Detected Not Detected - Ref. Range    Human Metapneumovirus Not Detected Not Detected    Human Rhinovirus/Enterovirus Not Detected Not Detected    Influenza A PCR Not Detected Not Detected    Influenza B PCR Not Detected Not Detected    Parainfluenza Virus 1 Not Detected Not Detected    Parainfluenza Virus 2 Not Detected Not Detected    Parainfluenza Virus 3 Not Detected Not Detected    Parainfluenza Virus 4 Not Detected Not Detected    RSV, PCR Not Detected Not Detected    Bordetella pertussis pcr Not Detected Not Detected    Bordetella parapertussis PCR Not Detected Not Detected    Chlamydophila pneumoniae PCR Not Detected Not Detected    Mycoplasma pneumo by PCR Detected (A) Not Detected   STAT Lactic Acid, Reflex    Collection Time: 12/10/24  8:19 PM    Specimen: Blood   Result Value Ref Range    Lactate 1.3 0.5 - 2.0 mmol/L       Ordered the above labs and independently interpreted results. My findings will be discussed in the medical decision making section below        RADIOLOGY  CT Angiogram Chest Pulmonary Embolism    Result Date: 12/10/2024  CT ANGIOGRAM CHEST PULMONARY EMBOLISM-  INDICATIONS: Hypoxia  TECHNIQUE: Radiation dose reduction techniques were utilized, including automated exposure control and exposure modulation based on body size. CT angiography of the chest. Three-dimensional reconstructions.  COMPARISON: None available  FINDINGS:  Pulmonary embolism in segmental left upper lobe pulmonary artery, coronal image 85. RV LV ratio is measured at 1. No aortic dissection.  The heart size is normal without pericardial effusion. Several mediastinal and bilateral hilar lymph nodes are demonstrated, some of which are mildly prominent, nonspecific, for example left paratracheal, 1.2 cm short axis, axial image 38, could be reactive in nature or potentially evidence of neoplasm. Masslike density in the subcarinal region, 2.7 cm x 3.1 cm  on axial image 59 is difficult to distinguish from the esophagus at this level, may represent pathologic adenopathy, possibility of an esophageal mass is not excluded, endoscopy and PET/CT correlation advised as indicated.  The airways appear clear. Bronchiectasis is present in both lungs.  Trace right pleural effusion.    The lungs show extensive bilateral infiltrates suggesting multifocal pneumonia; possibly, edema could also contribute to this appearance. Some areas of consolidation appear nodular or masslike in configuration, especially at the lung apices, possibility of underlying lesions/malignancy not excluded, CT follow-up recommended, PET/CT correlation can be obtained as indicated.  Upper abdominal structures show no acute findings. Mild hiatal hernia is present..  Degenerative changes are seen in the spine. No acute fracture is identified.       1. Pulmonary embolism in segmental left upper lobe pulmonary artery, coronal image 85. RV LV ratio is measured at 1. Extensive bilateral pulmonary infiltrates suggesting pneumonia, possibility of underlying lesion/neoplasm not excluded, clinical correlation and follow-up/further evaluation advised as indicated.  2. Subcarinal masslike density, could be evidence of neoplasm, possibly involving the esophagus. Bilateral hilar and mediastinal adenopathy. Advise further evaluation, that could include endoscopy, PET/CT.   Discussed by telephone with Dr. Armstrong at 2024, 12/10/2024.  This report was finalized on 12/10/2024 8:27 PM by Dr. Bc Luna M.D on Workstation: [id number]      XR Chest 1 View    Result Date: 12/10/2024  XR CHEST 1 VW-  HISTORY: Female who is 74 years-old, hypoxia  TECHNIQUE: Frontal view of the chest  COMPARISON: None available  FINDINGS: The heart size is normal. Diffuse bilateral infiltrates may represent edema an/or pneumonia, follow-up recommended. No large pleural effusion. No pneumothorax. No acute osseous process.      As described.  This  report was finalized on 12/10/2024 6:06 PM by Dr. Bc Luna M.D on Workstation: [id number]       Ordered the above noted radiological studies.  Independently interpreted by me and my independent review of findings can be found in the ED Course.  See dictation for official radiology interpretation.      PROCEDURES    Critical Care    Performed by: Babatunde Armstrong MD  Authorized by: Babatunde Armstrong MD    Critical care provider statement:     Critical care time (minutes):  39    Critical care time was exclusive of:  Separately billable procedures and treating other patients and teaching time    Critical care was necessary to treat or prevent imminent or life-threatening deterioration of the following conditions:  Respiratory failure and cardiac failure    Critical care was time spent personally by me on the following activities:  Development of treatment plan with patient or surrogate, discussions with consultants, evaluation of patient's response to treatment, examination of patient, obtaining history from patient or surrogate, ordering and performing treatments and interventions, ordering and review of laboratory studies, ordering and review of radiographic studies, pulse oximetry, re-evaluation of patient's condition and review of old charts    Care discussed with: admitting provider          EKG - Per my independent interpretation at 1720:    EKG Time: 1713  Rhythm/Rate: Sinus tachycardia with a rate of 146  Normal axis  Normal intervals  Nonspecific T wave abnormalities  No STEMI     No prior for visual comparison      MEDICATIONS GIVEN IN ER    Medications   azithromycin (ZITHROMAX) tablet 500 mg (500 mg Oral Given 12/10/24 1911)   iopamidol (ISOVUE-370) 76 % injection 100 mL (55 mL Intravenous Given 12/10/24 1930)   Enoxaparin Sodium (LOVENOX) syringe 50 mg (50 mg Subcutaneous Given 12/10/24 2103)         PROGRESS, DATA ANALYSIS, CONSULTS, AND MEDICAL DECISION MAKING    Please note that this section  constitutes my independent interpretation of clinical data including lab results, radiology, EKG's.  This constitutes my independent professional opinion regarding differential diagnosis and management of this patient.  It may include any factors such as history from outside sources, review of external records, social determinants of health, management of medications, response to those treatments, and discussions with other providers.    Differential Diagnosis and Plan: Initial concern for viral process, community-acquired pneumonia, PE, heart failure, renal failure, electrolyte abnormalities, anemia, arrhythmia, among others.  Plan for labs, chest x-ray, EKG, supportive care with supplemental O2, and reevaluation with results.    Additional sources:  - Discussed/ obtained information from independent historians: Caregiver at bedside states has a remote history of CHF but nothing persistent since then     - (Social Determinants of Health): None     - Shared decision making:  Patient and caregiver at bedside fully updated on and in agreement with the course and plan moving forward    ED Course as of 12/10/24 2158   Tue Dec 10, 2024   1747 WBC(!): 18.58 [DC]   1747 Hemoglobin(!): 10.3 [DC]   1747 D-Dimer, Quant(!): 2.44 [DC]   1747 XR Chest 1 View  Independent interpretation of the chest x-ray, no evidence of pneumothorax, scattered opacities bilaterally right greater than left [DC]   1807 Lactate(!!): 2.8 [DC]   1807 Procalcitonin(!): 1.94 [DC]   1807 proBNP(!): 10,957.0  No prior [DC]   1807 HS Troponin T(!): 48 [DC]   1807 Lipase: 15 [DC]   1807 Glucose(!): 176 [DC]   1807 BUN: 18 [DC]   1807 Creatinine(!): 1.01  0.81 one year ago [DC]   1807 Sodium(!): 135 [DC]   1807 Potassium: 3.5 [DC]   1807 Albumin(!): 2.7 [DC]   1808 ALT (SGPT)(!): 39 [DC]   1808 AST (SGOT)(!): 41 [DC]   1808 Alkaline Phosphatase(!): 183 [DC]   1812 XR Chest 1 View [DC]   1853 Mycoplasma pneumo by PCR(!): Detected [DC]   2016 CT Angiogram  Chest Pulmonary Embolism  Per my independent interpretation of the CT angiogram of the chest, diffuse interstitial opacities bilaterally, no pneumothorax, no overtly evident pulmonary embolism although will defer to final radiology report for any subtle findings.  Unclear if there is any superimposed edema component or if this is all more infectious will await final radiology report [DC]   2027 Discussed with Dr. Luna, Radiologist, patient has a small left upper lobe PE, has most likely pneumonia without significant edema component.  Has what could be a subcarinal lymphadenopathy versus mass. [DC]   2049 Discussed with  [DC]   2050 Discussed with AARON Cabello, LHA, discussed patient's clinical course and findings today, treatment modalities, and need for hospitalization. [DC]   2051 Sepsis fluids withheld given concern for possible underlying volume overload component with BNP > 10,000 [DC]   2053 Patient with multiple issues today including mycoplasma pneumonia, small pulmonary embolism, potentially competent of heart failure although by radiography lung appearance more infectious rather than volume although I suspect that there has to be some component of edema given the BNP.  I will not aggressively diurese or volume resuscitate at this time given the blood pressures running 115-120 systolic.  [DC]      ED Course User Index  [DC] Babatunde Armstrong MD       Hospitalization Considered?: yes    Orders Placed During This Visit:  Orders Placed This Encounter   Procedures    Respiratory Panel PCR w/COVID-19(SARS-CoV-2) [facility]Y/[facility]K/DONALDO/PAD/COR/DANA In-House, NP Swab in UTM/VTM, 2 HR TAT - Swab, Nasopharynx    XR Chest 1 View    CT Angiogram Chest Pulmonary Embolism    Comprehensive Metabolic Panel    Lipase    BNP    D-dimer, Quantitative    High Sensitivity Troponin T    Procalcitonin    Lactic Acid, Plasma    Magnesium    CBC Auto Differential    STAT Lactic Acid, Reflex    High Sensitivity Troponin T 1Hr    [facility] (on-call  MD unless specified) Details    ECG 12 Lead Tachycardia    Inpatient Admission    CBC & Differential       Additional orders considered but not placed:      Independent interpretation of labs, radiology studies, and discussions with consultants: See ED Course        AS OF 21:58 EST VITALS:    BP - 100/68  HR - (!) 128  TEMP - 97.7 °F (36.5 °C) (Tympanic)  02 SATS - 96%          DIAGNOSIS  Final diagnoses:   Pneumonia of both lungs due to Mycoplasma pneumoniae, unspecified part of lung   Other acute pulmonary embolism, unspecified whether acute cor pulmonale present   Hypoxia   Elevated brain natriuretic peptide (BNP) level         DISPOSITION  ED Disposition       ED Disposition   Decision to Admit    Condition   --    Comment   Level of Care: Telemetry [5]   Diagnosis: Pneumonia [566144]   Admitting Physician: BRANDON LOPEZ [[id number]]   Attending Physician: BRANDON LOPEZ [[id number]]   Certification: I Certify That Inpatient Hospital Services Are Medically Necessary For Greater Than 2 Midnights                  Please note that portions of this document were completed with a voice recognition program.    Note Disclaimer: At [facility], we believe that sharing information builds trust and better relationships. You are receiving this note because you recently visited [facility]. It is possible you will see health information before a provider has talked with you about it. This kind of information can be easy to misunderstand. To help you fully understand what it means for your health, we urge you to discuss this note with your provider.                       Babatunde Armstrong MD  12/10/24 6031

## 2024-12-10 NOTE — ED NOTES
Pt family was at doctor office and had low O2 reading.     Pt c/o cough since yesterday. Pt 85% on RA. Pt does not normally wear O2.

## 2024-12-10 NOTE — PROGRESS NOTES
Clinical Pharmacy Services: Medication History    Alanna Machuca is a 74 y.o. female presenting to [facility] for   Chief Complaint   Patient presents with    Cough       She  has no past medical history on file.    Allergies as of 12/10/2024    (No Known Allergies)       Medication information was obtained from: [facility]   Pharmacy and Phone Number:     Prior to Admission Medications       Prescriptions Last Dose Informant Patient Reported? Taking?    alendronate (FOSAMAX) 70 MG tablet  Pharmacy Yes Yes    Take 1 tablet by mouth Every 7 (Seven) Days.    levocetirizine (XYZAL) 5 MG tablet  Pharmacy Yes Yes    Take 1 tablet by mouth Every Evening.    pantoprazole (PROTONIX) 40 MG EC tablet  Pharmacy Yes Yes    Take 1 tablet by mouth Daily.    QUEtiapine (SEROquel) 200 MG tablet  Pharmacy Yes Yes    Take 1 tablet by mouth 2 (Two) Times a Day.              Medication notes:     This medication list is complete to the best of my knowledge as of 12/10/2024    Please call if questions.    Derek Lam  Medication History Technician   [phone]    12/10/2024 18:18 EST

## 2024-12-11 ENCOUNTER — APPOINTMENT (OUTPATIENT)
Dept: CARDIOLOGY | Facility: HOSPITAL | Age: 74
DRG: 871 | End: 2024-12-11
Payer: MEDICARE

## 2024-12-11 PROBLEM — R79.89 LFTS ABNORMAL: Status: ACTIVE | Noted: 2024-01-01

## 2024-12-11 PROBLEM — D63.8 ANEMIA, CHRONIC DISEASE: Status: ACTIVE | Noted: 2024-12-11

## 2024-12-11 PROBLEM — F20.9 SCHIZOPHRENIA: Status: ACTIVE | Noted: 2024-01-01

## 2024-12-11 PROBLEM — J15.7 MYCOPLASMA PNEUMONIA: Status: ACTIVE | Noted: 2024-12-11

## 2024-12-11 PROBLEM — A41.9 SEPSIS DUE TO PNEUMONIA: Status: ACTIVE | Noted: 2024-12-11

## 2024-12-11 PROBLEM — J18.9 SEPSIS DUE TO PNEUMONIA: Status: ACTIVE | Noted: 2024-12-11

## 2024-12-11 PROBLEM — J96.21 ACUTE AND CHRONIC RESPIRATORY FAILURE WITH HYPOXIA: Status: ACTIVE | Noted: 2024-12-11

## 2024-12-11 PROBLEM — I26.99 PULMONARY EMBOLISM: Status: ACTIVE | Noted: 2024-12-11

## 2024-12-11 LAB
ANION GAP SERPL CALCULATED.3IONS-SCNC: 14.1 MMOL/L (ref 5–15)
AV MEAN PRESS GRAD SYS DOP V1V2: 3.7 MMHG
AV VMAX SYS DOP: 133 CM/SEC
BH CV ECHO MEAS - ACS: 1.48 CM
BH CV ECHO MEAS - AO MAX PG: 7.1 MMHG
BH CV ECHO MEAS - AO ROOT DIAM: 2.7 CM
BH CV ECHO MEAS - AO V2 VTI: 24 CM
BH CV ECHO MEAS - AVA(I,D): 1.74 CM2
BH CV ECHO MEAS - EDV(CUBED): 41.3 ML
BH CV ECHO MEAS - EDV(MOD-SP2): 67 ML
BH CV ECHO MEAS - EDV(MOD-SP4): 70 ML
BH CV ECHO MEAS - EF(MOD-SP2): 35.8 %
BH CV ECHO MEAS - EF(MOD-SP4): 42.9 %
BH CV ECHO MEAS - ESV(CUBED): 23 ML
BH CV ECHO MEAS - ESV(MOD-SP2): 43 ML
BH CV ECHO MEAS - ESV(MOD-SP4): 40 ML
BH CV ECHO MEAS - FS: 17.8 %
BH CV ECHO MEAS - IVS/LVPW: 0.87 CM
BH CV ECHO MEAS - IVSD: 0.51 CM
BH CV ECHO MEAS - LV DIASTOLIC VOL/BSA (35-75): 53.8 CM2
BH CV ECHO MEAS - LV MASS(C)D: 45.4 GRAMS
BH CV ECHO MEAS - LV MAX PG: 3.6 MMHG
BH CV ECHO MEAS - LV MEAN PG: 1.77 MMHG
BH CV ECHO MEAS - LV SYSTOLIC VOL/BSA (12-30): 30.7 CM2
BH CV ECHO MEAS - LV V1 MAX: 94.8 CM/SEC
BH CV ECHO MEAS - LV V1 VTI: 17 CM
BH CV ECHO MEAS - LVIDD: 3.5 CM
BH CV ECHO MEAS - LVIDS: 2.8 CM
BH CV ECHO MEAS - LVOT AREA: 2.46 CM2
BH CV ECHO MEAS - LVOT DIAM: 1.77 CM
BH CV ECHO MEAS - LVPWD: 0.59 CM
BH CV ECHO MEAS - PA ACC TIME: 0.08 SEC
BH CV ECHO MEAS - PA V2 MAX: 90.2 CM/SEC
BH CV ECHO MEAS - RV MAX PG: 2.5 MMHG
BH CV ECHO MEAS - RV V1 MAX: 79.7 CM/SEC
BH CV ECHO MEAS - RV V1 VTI: 13.6 CM
BH CV ECHO MEAS - SV(LVOT): 41.8 ML
BH CV ECHO MEAS - SV(MOD-SP2): 24 ML
BH CV ECHO MEAS - SV(MOD-SP4): 30 ML
BH CV ECHO MEAS - SVI(LVOT): 32.1 ML/M2
BH CV ECHO MEAS - SVI(MOD-SP2): 18.4 ML/M2
BH CV ECHO MEAS - SVI(MOD-SP4): 23 ML/M2
BH CV ECHO MEAS - TAPSE (>1.6): 1.91 CM
BH CV ECHO MEAS - TR MAX PG: 25.8 MMHG
BH CV ECHO MEAS - TR MAX VEL: 253.9 CM/SEC
BH CV LOWER VASCULAR LEFT COMMON FEMORAL AUGMENT: NORMAL
BH CV LOWER VASCULAR LEFT COMMON FEMORAL COMPETENT: NORMAL
BH CV LOWER VASCULAR LEFT COMMON FEMORAL COMPRESS: NORMAL
BH CV LOWER VASCULAR LEFT COMMON FEMORAL PHASIC: NORMAL
BH CV LOWER VASCULAR LEFT COMMON FEMORAL SPONT: NORMAL
BH CV LOWER VASCULAR LEFT DISTAL FEMORAL COMPRESS: NORMAL
BH CV LOWER VASCULAR LEFT GASTRONEMIUS COMPRESS: NORMAL
BH CV LOWER VASCULAR LEFT GREATER SAPH AK COMPRESS: NORMAL
BH CV LOWER VASCULAR LEFT GREATER SAPH BK COMPRESS: NORMAL
BH CV LOWER VASCULAR LEFT LESSER SAPH COMPRESS: NORMAL
BH CV LOWER VASCULAR LEFT MID FEMORAL AUGMENT: NORMAL
BH CV LOWER VASCULAR LEFT MID FEMORAL COMPETENT: NORMAL
BH CV LOWER VASCULAR LEFT MID FEMORAL COMPRESS: NORMAL
BH CV LOWER VASCULAR LEFT MID FEMORAL PHASIC: NORMAL
BH CV LOWER VASCULAR LEFT MID FEMORAL SPONT: NORMAL
BH CV LOWER VASCULAR LEFT PERONEAL COMPRESS: NORMAL
BH CV LOWER VASCULAR LEFT POPLITEAL AUGMENT: NORMAL
BH CV LOWER VASCULAR LEFT POPLITEAL COMPETENT: NORMAL
BH CV LOWER VASCULAR LEFT POPLITEAL COMPRESS: NORMAL
BH CV LOWER VASCULAR LEFT POPLITEAL PHASIC: NORMAL
BH CV LOWER VASCULAR LEFT POPLITEAL SPONT: NORMAL
BH CV LOWER VASCULAR LEFT POSTERIOR TIBIAL COMPRESS: NORMAL
BH CV LOWER VASCULAR LEFT PROFUNDA FEMORAL COMPRESS: NORMAL
BH CV LOWER VASCULAR LEFT PROXIMAL FEMORAL COMPRESS: NORMAL
BH CV LOWER VASCULAR LEFT SAPHENOFEMORAL JUNCTION COMPRESS: NORMAL
BH CV LOWER VASCULAR RIGHT COMMON FEMORAL AUGMENT: NORMAL
BH CV LOWER VASCULAR RIGHT COMMON FEMORAL COMPETENT: NORMAL
BH CV LOWER VASCULAR RIGHT COMMON FEMORAL COMPRESS: NORMAL
BH CV LOWER VASCULAR RIGHT COMMON FEMORAL PHASIC: NORMAL
BH CV LOWER VASCULAR RIGHT COMMON FEMORAL SPONT: NORMAL
BH CV LOWER VASCULAR RIGHT DISTAL FEMORAL COMPRESS: NORMAL
BH CV LOWER VASCULAR RIGHT GASTRONEMIUS COMPRESS: NORMAL
BH CV LOWER VASCULAR RIGHT GREATER SAPH AK COMPRESS: NORMAL
BH CV LOWER VASCULAR RIGHT GREATER SAPH BK COMPRESS: NORMAL
BH CV LOWER VASCULAR RIGHT LESSER SAPH COMPRESS: NORMAL
BH CV LOWER VASCULAR RIGHT MID FEMORAL AUGMENT: NORMAL
BH CV LOWER VASCULAR RIGHT MID FEMORAL COMPETENT: NORMAL
BH CV LOWER VASCULAR RIGHT MID FEMORAL COMPRESS: NORMAL
BH CV LOWER VASCULAR RIGHT MID FEMORAL PHASIC: NORMAL
BH CV LOWER VASCULAR RIGHT MID FEMORAL SPONT: NORMAL
BH CV LOWER VASCULAR RIGHT PERONEAL COMPRESS: NORMAL
BH CV LOWER VASCULAR RIGHT POPLITEAL AUGMENT: NORMAL
BH CV LOWER VASCULAR RIGHT POPLITEAL COMPETENT: NORMAL
BH CV LOWER VASCULAR RIGHT POPLITEAL COMPRESS: NORMAL
BH CV LOWER VASCULAR RIGHT POPLITEAL PHASIC: NORMAL
BH CV LOWER VASCULAR RIGHT POPLITEAL SPONT: NORMAL
BH CV LOWER VASCULAR RIGHT POSTERIOR TIBIAL COMPRESS: NORMAL
BH CV LOWER VASCULAR RIGHT PROFUNDA FEMORAL COMPRESS: NORMAL
BH CV LOWER VASCULAR RIGHT PROXIMAL FEMORAL COMPRESS: NORMAL
BH CV LOWER VASCULAR RIGHT SAPHENOFEMORAL JUNCTION COMPRESS: NORMAL
BH CV XLRA - TDI S': 16.6 CM/SEC
BUN SERPL-MCNC: 20 MG/DL (ref 8–23)
BUN/CREAT SERPL: 25.3 (ref 7–25)
CALCIUM SPEC-SCNC: 7.7 MG/DL (ref 8.6–10.5)
CHLORIDE SERPL-SCNC: 103 MMOL/L (ref 98–107)
CO2 SERPL-SCNC: 22.9 MMOL/L (ref 22–29)
CREAT SERPL-MCNC: 0.79 MG/DL (ref 0.57–1)
DEPRECATED RDW RBC AUTO: 42.9 FL (ref 37–54)
EGFRCR SERPLBLD CKD-EPI 2021: 78.1 ML/MIN/1.73
ERYTHROCYTE [DISTWIDTH] IN BLOOD BY AUTOMATED COUNT: 14.3 % (ref 12.3–15.4)
GEN 5 1HR TROPONIN T REFLEX: 49 NG/L
GLUCOSE SERPL-MCNC: 109 MG/DL (ref 65–99)
HCT VFR BLD AUTO: 27.7 % (ref 34–46.6)
HGB BLD-MCNC: 9.5 G/DL (ref 12–15.9)
LV EF BIPLANE MOD: 39.3 %
MCH RBC QN AUTO: 28.8 PG (ref 26.6–33)
MCHC RBC AUTO-ENTMCNC: 34.3 G/DL (ref 31.5–35.7)
MCV RBC AUTO: 83.9 FL (ref 79–97)
PLATELET # BLD AUTO: 601 10*3/MM3 (ref 140–450)
PMV BLD AUTO: 9.9 FL (ref 6–12)
POTASSIUM SERPL-SCNC: 3.5 MMOL/L (ref 3.5–5.2)
QT INTERVAL: 269 MS
QT INTERVAL: 313 MS
QTC INTERVAL: 420 MS
QTC INTERVAL: 462 MS
RBC # BLD AUTO: 3.3 10*6/MM3 (ref 3.77–5.28)
SINUS: 2.7 CM
SODIUM SERPL-SCNC: 140 MMOL/L (ref 136–145)
STJ: 2.48 CM
TROPONIN T % DELTA: 2 %
TROPONIN T NUMERIC DELTA: 1 NG/L
TROPONIN T SERPL HS-MCNC: 52 NG/L
WBC NRBC COR # BLD AUTO: 20.46 10*3/MM3 (ref 3.4–10.8)
WHOLE BLOOD HOLD SPECIMEN: NORMAL

## 2024-12-11 PROCEDURE — 93010 ELECTROCARDIOGRAM REPORT: CPT | Performed by: INTERNAL MEDICINE

## 2024-12-11 PROCEDURE — 93306 TTE W/DOPPLER COMPLETE: CPT | Performed by: INTERNAL MEDICINE

## 2024-12-11 PROCEDURE — 93306 TTE W/DOPPLER COMPLETE: CPT

## 2024-12-11 PROCEDURE — 93970 EXTREMITY STUDY: CPT

## 2024-12-11 PROCEDURE — 93970 EXTREMITY STUDY: CPT | Performed by: SURGERY

## 2024-12-11 PROCEDURE — 25010000002 CEFTRIAXONE PER 250 MG: Performed by: HOSPITALIST

## 2024-12-11 PROCEDURE — 99223 1ST HOSP IP/OBS HIGH 75: CPT

## 2024-12-11 PROCEDURE — 87040 BLOOD CULTURE FOR BACTERIA: CPT

## 2024-12-11 PROCEDURE — 25510000001 PERFLUTREN 6.52 MG/ML SUSPENSION 2 ML VIAL

## 2024-12-11 PROCEDURE — 84484 ASSAY OF TROPONIN QUANT: CPT | Performed by: NURSE PRACTITIONER

## 2024-12-11 PROCEDURE — 25010000002 FUROSEMIDE PER 20 MG: Performed by: NURSE PRACTITIONER

## 2024-12-11 PROCEDURE — 85027 COMPLETE CBC AUTOMATED: CPT

## 2024-12-11 PROCEDURE — 80048 BASIC METABOLIC PNL TOTAL CA: CPT

## 2024-12-11 PROCEDURE — 25010000002 ENOXAPARIN PER 10 MG

## 2024-12-11 PROCEDURE — 25810000003 SODIUM CHLORIDE 0.9 % SOLUTION

## 2024-12-11 PROCEDURE — 93005 ELECTROCARDIOGRAM TRACING: CPT

## 2024-12-11 PROCEDURE — 99222 1ST HOSP IP/OBS MODERATE 55: CPT | Performed by: INTERNAL MEDICINE

## 2024-12-11 RX ORDER — QUETIAPINE FUMARATE 50 MG/1
200 TABLET, FILM COATED ORAL 2 TIMES DAILY
Status: DISCONTINUED | OUTPATIENT
Start: 2024-12-11 | End: 2024-12-20 | Stop reason: HOSPADM

## 2024-12-11 RX ORDER — SODIUM CHLORIDE AND POTASSIUM CHLORIDE 150; 900 MG/100ML; MG/100ML
100 INJECTION, SOLUTION INTRAVENOUS CONTINUOUS
Status: DISCONTINUED | OUTPATIENT
Start: 2024-12-11 | End: 2024-12-11

## 2024-12-11 RX ORDER — AMOXICILLIN 250 MG
2 CAPSULE ORAL 2 TIMES DAILY PRN
Status: DISCONTINUED | OUTPATIENT
Start: 2024-12-11 | End: 2024-12-16

## 2024-12-11 RX ORDER — NITROGLYCERIN 0.4 MG/1
0.4 TABLET SUBLINGUAL
Status: DISCONTINUED | OUTPATIENT
Start: 2024-12-11 | End: 2024-12-16

## 2024-12-11 RX ORDER — AZITHROMYCIN 250 MG/1
500 TABLET, FILM COATED ORAL
Status: DISCONTINUED | OUTPATIENT
Start: 2024-12-11 | End: 2024-12-12

## 2024-12-11 RX ORDER — POLYETHYLENE GLYCOL 3350 17 G/17G
17 POWDER, FOR SOLUTION ORAL DAILY PRN
Status: DISCONTINUED | OUTPATIENT
Start: 2024-12-11 | End: 2024-12-16

## 2024-12-11 RX ORDER — POTASSIUM CHLORIDE 750 MG/1
40 TABLET, FILM COATED, EXTENDED RELEASE ORAL EVERY 4 HOURS
Status: DISCONTINUED | OUTPATIENT
Start: 2024-12-11 | End: 2024-12-11

## 2024-12-11 RX ORDER — ONDANSETRON 2 MG/ML
4 INJECTION INTRAMUSCULAR; INTRAVENOUS EVERY 6 HOURS PRN
Status: DISCONTINUED | OUTPATIENT
Start: 2024-12-11 | End: 2024-12-16

## 2024-12-11 RX ORDER — FUROSEMIDE 10 MG/ML
40 INJECTION INTRAMUSCULAR; INTRAVENOUS ONCE
Status: COMPLETED | OUTPATIENT
Start: 2024-12-11 | End: 2024-12-11

## 2024-12-11 RX ORDER — ENOXAPARIN SODIUM 100 MG/ML
1 INJECTION SUBCUTANEOUS EVERY 12 HOURS
Status: DISCONTINUED | OUTPATIENT
Start: 2024-12-11 | End: 2024-12-11

## 2024-12-11 RX ORDER — SODIUM CHLORIDE AND POTASSIUM CHLORIDE 150; 900 MG/100ML; MG/100ML
50 INJECTION, SOLUTION INTRAVENOUS CONTINUOUS
Status: DISCONTINUED | OUTPATIENT
Start: 2024-12-11 | End: 2024-12-11

## 2024-12-11 RX ORDER — POTASSIUM CHLORIDE 1.5 G/1.58G
40 POWDER, FOR SOLUTION ORAL EVERY 4 HOURS
Status: DISPENSED | OUTPATIENT
Start: 2024-12-11 | End: 2024-12-11

## 2024-12-11 RX ORDER — METOPROLOL SUCCINATE 25 MG/1
12.5 TABLET, EXTENDED RELEASE ORAL
Status: DISCONTINUED | OUTPATIENT
Start: 2024-12-11 | End: 2024-12-15

## 2024-12-11 RX ORDER — BISACODYL 10 MG
10 SUPPOSITORY, RECTAL RECTAL DAILY PRN
Status: DISCONTINUED | OUTPATIENT
Start: 2024-12-11 | End: 2024-12-16

## 2024-12-11 RX ORDER — ONDANSETRON 4 MG/1
4 TABLET, ORALLY DISINTEGRATING ORAL EVERY 6 HOURS PRN
Status: DISCONTINUED | OUTPATIENT
Start: 2024-12-11 | End: 2024-12-16

## 2024-12-11 RX ORDER — ENOXAPARIN SODIUM 100 MG/ML
1 INJECTION SUBCUTANEOUS EVERY 12 HOURS
Status: DISCONTINUED | OUTPATIENT
Start: 2024-12-11 | End: 2024-12-16

## 2024-12-11 RX ORDER — ACETAMINOPHEN 325 MG/1
650 TABLET ORAL EVERY 4 HOURS PRN
Status: DISCONTINUED | OUTPATIENT
Start: 2024-12-11 | End: 2024-12-13

## 2024-12-11 RX ORDER — BISACODYL 5 MG/1
5 TABLET, DELAYED RELEASE ORAL DAILY PRN
Status: DISCONTINUED | OUTPATIENT
Start: 2024-12-11 | End: 2024-12-16

## 2024-12-11 RX ORDER — PANTOPRAZOLE SODIUM 40 MG/1
40 TABLET, DELAYED RELEASE ORAL DAILY
Status: DISCONTINUED | OUTPATIENT
Start: 2024-12-11 | End: 2024-12-16

## 2024-12-11 RX ORDER — IPRATROPIUM BROMIDE AND ALBUTEROL SULFATE 2.5; .5 MG/3ML; MG/3ML
3 SOLUTION RESPIRATORY (INHALATION) EVERY 4 HOURS PRN
Status: DISCONTINUED | OUTPATIENT
Start: 2024-12-11 | End: 2024-12-16

## 2024-12-11 RX ADMIN — AZITHROMYCIN DIHYDRATE 500 MG: 250 TABLET ORAL at 18:34

## 2024-12-11 RX ADMIN — METOPROLOL TARTRATE 2.5 MG: 1 INJECTION, SOLUTION INTRAVENOUS at 05:45

## 2024-12-11 RX ADMIN — CEFTRIAXONE 2000 MG: 2 INJECTION, POWDER, FOR SOLUTION INTRAMUSCULAR; INTRAVENOUS at 11:39

## 2024-12-11 RX ADMIN — ENOXAPARIN SODIUM 40 MG: 100 INJECTION SUBCUTANEOUS at 10:00

## 2024-12-11 RX ADMIN — QUETIAPINE FUMARATE 200 MG: 50 TABLET ORAL at 21:32

## 2024-12-11 RX ADMIN — PANTOPRAZOLE SODIUM 40 MG: 40 TABLET, DELAYED RELEASE ORAL at 10:01

## 2024-12-11 RX ADMIN — POTASSIUM CHLORIDE 40 MEQ: 1.5 FOR SOLUTION ORAL at 11:27

## 2024-12-11 RX ADMIN — ENOXAPARIN SODIUM 40 MG: 100 INJECTION SUBCUTANEOUS at 21:33

## 2024-12-11 RX ADMIN — QUETIAPINE FUMARATE 200 MG: 50 TABLET ORAL at 10:00

## 2024-12-11 RX ADMIN — METOPROLOL SUCCINATE 12.5 MG: 25 TABLET, EXTENDED RELEASE ORAL at 14:21

## 2024-12-11 RX ADMIN — SODIUM CHLORIDE 500 ML: 9 INJECTION, SOLUTION INTRAVENOUS at 02:21

## 2024-12-11 RX ADMIN — FUROSEMIDE 40 MG: 10 INJECTION, SOLUTION INTRAMUSCULAR; INTRAVENOUS at 04:42

## 2024-12-11 RX ADMIN — PERFLUTREN 2 ML: 6.52 INJECTION, SUSPENSION INTRAVENOUS at 08:46

## 2024-12-11 NOTE — PLAN OF CARE
Goal Outcome Evaluation:  Plan of Care Reviewed With: patient        Progress: improving    Pt alert to self and sometimes place.  Cooperative.  1L NC and ST.  Oral metoprolol started per cardiology.  Caregiver updated.  She has slept most of shift.  Will continue to monitor.

## 2024-12-11 NOTE — CONSULTS
Patient Name: Alanna Machuca  :1950  74 y.o.    Date of Admission: 12/10/2024  Date of Consultation:  24  Encounter Provider: Opal Farrell MD  Place of Service: [facility] CARDIOLOGY  Referring Provider: Carmel Sawant MD  Patient Care Team:  Provider, No Known as PCP - General      Chief complaint:  cardiomyopathy  History of Present Illness:  This is a pleasant 74-year-old woman who is admitted for acute hypoxic respiratory failure.  She is unable to provide a meaningful history.  She is resting flat and comfortably in bed on 1 L nasal cannula oxygen.  Apparently she was hypoxic yesterday at a physician's office in the 80s.  Her D-dimer was elevated she had a CT angiogram which showed diffuse pneumonia possible pulmonary edema no effusions small segmental PE in the left upper lobe.  Echocardiogram shows reduced systolic ejection fraction globally about 30 to 35%.  No significant wall motion abnormalities or valvular disease.  RVSP was normal.  EKG shows sinus tachycardia.  Troponins are minimally elevated in the 40-50 range.  Her hemoglobin is low at 9.5.  Leukocytosis is marked at 20. BNP is 10K  She was given fluids overnight.  She has been tachycardic in the 120s to 130s range.  Blood pressures are ranging between 101 50 systolic.    History reviewed. No pertinent past medical history.    History reviewed. No pertinent surgical history.      Prior to Admission medications    Medication Sig Start Date End Date Taking? Authorizing Provider   alendronate (FOSAMAX) 70 MG tablet Take 1 tablet by mouth Every 7 (Seven) Days.   Yes Provider, MD Ana   levocetirizine (XYZAL) 5 MG tablet Take 1 tablet by mouth Every Evening.   Yes Provider, MD Ana   pantoprazole (PROTONIX) 40 MG EC tablet Take 1 tablet by mouth Daily.   Yes Provider, MD Ana   QUEtiapine (SEROquel) 200 MG tablet Take 1 tablet by mouth 2 (Two) Times a Day.   Yes Provider  "Historical, MD       No Known Allergies    Social History     Socioeconomic History    Marital status: Single   Tobacco Use    Smoking status: Unknown   Vaping Use    Vaping status: Never Used   Substance and Sexual Activity    Alcohol use: Defer    Drug use: Defer    Sexual activity: Defer       History reviewed. No pertinent family history.    REVIEW OF SYSTEMS:   All systems reviewed.  Pertinent positives identified in HPI.  All other systems are negative.      Objective:     Vitals:    12/11/24 0614 12/11/24 0617 12/11/24 0628 12/11/24 0842   BP:    120/79   BP Location:       Patient Position:       Pulse: 119 120  (!) 131   Resp:       Temp:       TempSrc:       SpO2: 97% 97%     Weight:   36.9 kg (81 lb 5.6 oz) 36.7 kg (81 lb)   Height:    157.5 cm (62\")     Body mass index is 14.82 kg/m².    General Appearance:    Alert, cooperative, in no acute distress   Head:    Normocephalic, without obvious abnormality, atraumatic   Eyes:            Lids and lashes normal, conjunctivae and sclerae normal, no icterus, no pallor, corneas clear, PERRLA   Ears:    Ears appear intact with no abnormalities noted   Throat:   No oral lesions, no thrush, oral mucosa moist   Neck:   No adenopathy, supple, trachea midline, no thyromegaly, no carotid bruit, no JVD   Back:     No kyphosis present, no scoliosis present, no skin lesions, erythema or scars, no tenderness to percussion or palpation, range of motion normal   Lungs:   Diffuse rales    Heart:    Regular rhythm and normal rate, normal S1 and S2, no murmur, no gallop, no rub, no click   Chest Wall:    No abnormalities observed   Abdomen:     Normal bowel sounds, no masses, no organomegaly, soft, nontender, nondistended, no guarding, no rebound  tenderness   Extremities:   Moves all extremities well, no edema, no cyanosis, no redness   Pulses:   Pulses palpable and equal bilaterally. Normal radial, carotid, femoral, dorsalis pedis and posterior tibial pulses bilaterally. " Normal abdominal aorta   Skin:  Psychiatric:   No bleeding, bruising or rash    Alert and oriented x 3, normal mood and affect   Lab Review:     Results from last 7 days   Lab Units 12/11/24  0311 12/10/24  1714   SODIUM mmol/L 140 135*   POTASSIUM mmol/L 3.5 3.5   CHLORIDE mmol/L 103 99   CO2 mmol/L 22.9 22.8   BUN mg/dL 20 18   CREATININE mg/dL 0.79 1.01*   CALCIUM mg/dL 7.7* 8.1*   BILIRUBIN mg/dL  --  0.6   ALK PHOS U/L  --  183*   ALT (SGPT) U/L  --  39*   AST (SGOT) U/L  --  41*   GLUCOSE mg/dL 109* 176*     Results from last 7 days   Lab Units 12/11/24  0646 12/10/24  1817 12/10/24  1714   HSTROP T ng/L 52* 49* 48*     Results from last 7 days   Lab Units 12/11/24  0311   WBC 10*3/mm3 20.46*   HEMOGLOBIN g/dL 9.5*   HEMATOCRIT % 27.7*   PLATELETS 10*3/mm3 601*         Results from last 7 days   Lab Units 12/10/24  1714   MAGNESIUM mg/dL 2.3                   I personally viewed and interpreted the patient's EKG/Telemetry data.        Assessment and Plan:       Pneumo sepsis 2/2 mycoplasma  Systolic cardiomyopathy unknown etiology. Appears dry on exam, JVD is flat, no edema, oral mucosa is dry. Rales likely related to pneumonia. Will start low dose toprol given ongoing sinus tachycardia.   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 She looks dry to me. Can re-consider tomorrow.   Tachycardia driven by sepsis, possibly cardiomyopathy?   Opal Farrell MD  12/11/24  13:10 EST

## 2024-12-11 NOTE — CONSULTS
Inpatient Thoracic Surgery Consult  Consult performed by: Guillermo Green APRN  Consult ordered by: González Esparza MD          Patient Care Team:  Provider, No Known as PCP - General    Chief Complaint   Patient presents with    Cough       Subjective     History of Present Illness  Alanna Machuca is a 74-year-old female with a past medical history significant for schizophrenia who presented to [facility] on 12/10/2024 with complaints of acute cough and shortness of breath since a day prior.  She was noted to be hypoxic at her doctor's office and required the application of supplemental oxygen.  Workup remarkable for PE, mycoplasma pneumoniae, heart failure.  CTA of the chest also demonstrated a subcarinal masslike density measuring 2.7 x 3.1 cm.  We have been consulted given this finding. Patient was agitated on exam, frustrated with her hospitalization.  She denied any shortness of breath and is on 1 L via nasal cannula.  She denied any significant pain.  She has a productive cough.  No fevers, chills, night sweats.  She denies any significant esophageal dysphagia or unintended weight loss.    Review of Systems   Constitutional:  Negative for activity change, chills, fatigue and fever.   HENT:  Negative for congestion and sore throat.    Respiratory:  Positive for cough. Negative for choking and shortness of breath (Improved).    Cardiovascular:  Negative for chest pain.   Gastrointestinal:  Negative for abdominal distention, nausea and vomiting.   Genitourinary:  Negative for dysuria.   Musculoskeletal:  Negative for back pain.   Skin:  Negative for color change and pallor.   Neurological:  Negative for dizziness, tremors and syncope.   Psychiatric/Behavioral:  Negative for confusion.         History reviewed. No pertinent past medical history.  History reviewed. No pertinent surgical history.  History reviewed. No pertinent family history.  Social History     Socioeconomic History     Marital status: Single   Tobacco Use    Smoking status: Unknown   Vaping Use    Vaping status: Never Used   Substance and Sexual Activity    Alcohol use: Defer    Drug use: Defer    Sexual activity: Defer     Medications Prior to Admission   Medication Sig Dispense Refill Last Dose/Taking    alendronate (FOSAMAX) 70 MG tablet Take 1 tablet by mouth Every 7 (Seven) Days.   Taking    levocetirizine (XYZAL) 5 MG tablet Take 1 tablet by mouth Every Evening.   Taking    pantoprazole (PROTONIX) 40 MG EC tablet Take 1 tablet by mouth Daily.   Taking    QUEtiapine (SEROquel) 200 MG tablet Take 1 tablet by mouth 2 (Two) Times a Day.   Taking     No Known Allergies    Objective      Vital Signs  Temp:  [97.7 °F (36.5 °C)-98.1 °F (36.7 °C)] 97.9 °F (36.6 °C)  Heart Rate:  [113-152] 130  Resp:  [20-22] 20  BP: ()/(63-92) 93/63    Intake & Output (last day)         12/10 0701  12/11 0700 12/11 0701  12/12 0700    Urine (mL/kg/hr) 600     Total Output 600     Net -600                   Physical Exam  Constitutional:       General: She is not in acute distress.     Appearance: Normal appearance. She is ill-appearing. She is not toxic-appearing.   HENT:      Head: Normocephalic.      Mouth/Throat:      Mouth: Mucous membranes are moist.      Pharynx: Oropharynx is clear.   Eyes:      Pupils: Pupils are equal, round, and reactive to light.   Cardiovascular:      Rate and Rhythm: Normal rate and regular rhythm.   Pulmonary:      Effort: Pulmonary effort is normal. No respiratory distress.      Breath sounds: Rhonchi present.   Abdominal:      General: Abdomen is flat. There is no distension.      Palpations: Abdomen is soft.   Musculoskeletal:         General: No swelling or tenderness.   Skin:     General: Skin is warm and dry.      Capillary Refill: Capillary refill takes less than 2 seconds.   Neurological:      General: No focal deficit present.      Mental Status: She is alert.      Comments: Alert    Psychiatric:          Mood and Affect: Mood normal.         Results Review:    I reviewed the patient's new clinical results.  I reviewed the patient's new imaging results and agree with the interpretation.  Discussed with patient, nurse, will discuss with surgeon    Imaging Results (Last 24 Hours)       Procedure Component Value Units Date/Time    CT Angiogram Chest Pulmonary Embolism [[id number]] Collected: 12/10/24 2011     Updated: 12/10/24 2030    Narrative:      CT ANGIOGRAM CHEST PULMONARY EMBOLISM-     INDICATIONS: Hypoxia     TECHNIQUE: Radiation dose reduction techniques were utilized, including  automated exposure control and exposure modulation based on body size.  CT angiography of the chest. Three-dimensional reconstructions.     COMPARISON: None available      FINDINGS:     Pulmonary embolism in segmental left upper lobe pulmonary artery,  coronal image 85. RV LV ratio is measured at 1. No aortic dissection.     The heart size is normal without pericardial effusion. Several  mediastinal and bilateral hilar lymph nodes are demonstrated, some of  which are mildly prominent, nonspecific, for example left paratracheal,  1.2 cm short axis, axial image 38, could be reactive in nature or  potentially evidence of neoplasm. Masslike density in the subcarinal  region, 2.7 cm x 3.1 cm on axial image 59 is difficult to distinguish  from the esophagus at this level, may represent pathologic adenopathy,  possibility of an esophageal mass is not excluded, endoscopy and PET/CT  correlation advised as indicated.     The airways appear clear. Bronchiectasis is present in both lungs.     Trace right pleural effusion.           The lungs show extensive bilateral infiltrates suggesting multifocal  pneumonia; possibly, edema could also contribute to this appearance.  Some areas of consolidation appear nodular or masslike in configuration,  especially at the lung apices, possibility of underlying  lesions/malignancy not excluded, CT follow-up  recommended, PET/CT  correlation can be obtained as indicated.     Upper abdominal structures show no acute findings. Mild hiatal hernia is  present..     Degenerative changes are seen in the spine. No acute fracture is  identified.       Impression:         1. Pulmonary embolism in segmental left upper lobe pulmonary artery,  coronal image 85. RV LV ratio is measured at 1. Extensive bilateral  pulmonary infiltrates suggesting pneumonia, possibility of underlying  lesion/neoplasm not excluded, clinical correlation and follow-up/further  evaluation advised as indicated.     2. Subcarinal masslike density, could be evidence of neoplasm, possibly  involving the esophagus. Bilateral hilar and mediastinal adenopathy.  Advise further evaluation, that could include endoscopy, PET/CT.        Discussed by telephone with Dr. Armstrong at 2024, 12/10/2024.     This report was finalized on 12/10/2024 8:27 PM by Dr. Bc Luna M.D on Workstation: [id number]       XR Chest 1 View [[id number]] Collected: 12/10/24 1805     Updated: 12/10/24 1809    Narrative:      XR CHEST 1 VW-     HISTORY: Female who is 74 years-old, hypoxia     TECHNIQUE: Frontal view of the chest     COMPARISON: None available     FINDINGS: The heart size is normal. Diffuse bilateral infiltrates may  represent edema an/or pneumonia, follow-up recommended. No large pleural  effusion. No pneumothorax. No acute osseous process.       Impression:      As described.     This report was finalized on 12/10/2024 6:06 PM by Dr. Bc Luna M.D on Workstation: [id number]               Lab Results:  Lab Results (last 24 hours)       Procedure Component Value Units Date/Time    [place] Draw [[id number]] Collected: 12/11/24 0646    Specimen: Blood Updated: 12/11/24 0816    Narrative:      The following orders were created for panel order [place] Draw.  Procedure                               Abnormality         Status                     ---------                                -----------         ------                     [place] Top[[id number]]                                     Final result                 Please view results for these tests on the individual orders.    Lavender Top [[id number]] Collected: 12/11/24 0646    Specimen: Blood Updated: 12/11/24 0816     Extra Tube hold for add-on     Comment: Auto resulted       High Sensitivity Troponin T [[id number]]  (Abnormal) Collected: 12/11/24 0646    Specimen: Blood Updated: 12/11/24 0801     HS Troponin T 52 ng/L     Narrative:      High Sensitive Troponin T Reference Range:  <14.0 ng/L- Negative Female for AMI  <22.0 ng/L- Negative Male for AMI  >=14 - Abnormal Female indicating possible myocardial injury.  >=22 - Abnormal Male indicating possible myocardial injury.   Clinicians would have to utilize clinical acumen, EKG, Troponin, and serial changes to determine if it is an Acute Myocardial Infarction or myocardial injury due to an underlying chronic condition.         Blood Culture - Blood, Hand, Right [[id number]] Collected: 12/11/24 0657    Specimen: Blood from Hand, Right Updated: 12/11/24 0737    Blood Culture - Blood, Arm, Left [[id number]] Collected: 12/11/24 0646    Specimen: Blood from Arm, Left Updated: 12/11/24 0736    Basic Metabolic Panel [[id number]]  (Abnormal) Collected: 12/11/24 0311    Specimen: Blood Updated: 12/11/24 0343     Glucose 109 mg/dL      BUN 20 mg/dL      Creatinine 0.79 mg/dL      Sodium 140 mmol/L      Potassium 3.5 mmol/L      Chloride 103 mmol/L      CO2 22.9 mmol/L      Calcium 7.7 mg/dL      BUN/Creatinine Ratio 25.3     Anion Gap 14.1 mmol/L      eGFR 78.1 mL/min/1.73     Narrative:      GFR Categories in Chronic Kidney Disease (CKD)      GFR Category          GFR (mL/min/1.73)    Interpretation  G1                     90 or greater         Normal or high (1)  G2                      60-89                Mild decrease (1)  G3a                   45-59                Mild to moderate  decrease  G3b                   30-44                Moderate to severe decrease  G4                    15-29                Severe decrease  G5                    14 or less           Kidney failure          (1)In the absence of evidence of kidney disease, neither GFR category G1 or G2 fulfill the criteria for CKD.    eGFR calculation 2021 CKD-EPI creatinine equation, which does not include race as a factor    CBC (No Diff) [[id number]]  (Abnormal) Collected: 12/11/24 0311    Specimen: Blood Updated: 12/11/24 0328     WBC 20.46 10*3/mm3      RBC 3.30 10*6/mm3      Hemoglobin 9.5 g/dL      Hematocrit 27.7 %      MCV 83.9 fL      MCH 28.8 pg      MCHC 34.3 g/dL      RDW 14.3 %      RDW-SD 42.9 fl      MPV 9.9 fL      Platelets 601 10*3/mm3     High Sensitivity Troponin T 1Hr [[id number]]  (Abnormal) Collected: 12/10/24 1817    Specimen: Blood Updated: 12/11/24 0002     HS Troponin T 49 ng/L      Troponin T Delta 1 ng/L      Troponin T % Change 2 %     Narrative:      High Sensitive Troponin T Reference Range:  <14.0 ng/L- Negative Female for AMI  <22.0 ng/L- Negative Male for AMI  >=14 - Abnormal Female indicating possible myocardial injury.  >=22 - Abnormal Male indicating possible myocardial injury.   Clinicians would have to utilize clinical acumen, EKG, Troponin, and serial changes to determine if it is an Acute Myocardial Infarction or myocardial injury due to an underlying chronic condition.         STAT Lactic Acid, Reflex [[id number]]  (Normal) Collected: 12/10/24 2019    Specimen: Blood Updated: 12/10/24 2050     Lactate 1.3 mmol/L     Respiratory Panel PCR w/COVID-19(SARS-CoV-2) JEROMY/VIK/DONALDO/PAD/COR/DANA In-House, NP Swab in UTM/VTM, 2 HR TAT - Swab, Nasopharynx [[id number]]  (Abnormal) Collected: 12/10/24 1716    Specimen: Swab from Nasopharynx Updated: 12/10/24 1832     ADENOVIRUS, PCR Not Detected     Coronavirus 229E Not Detected     Coronavirus HKU1 Not Detected     Coronavirus NL63 Not Detected      Coronavirus OC43 Not Detected     COVID19 Not Detected     Human Metapneumovirus Not Detected     Human Rhinovirus/Enterovirus Not Detected     Influenza A PCR Not Detected     Influenza B PCR Not Detected     Parainfluenza Virus 1 Not Detected     Parainfluenza Virus 2 Not Detected     Parainfluenza Virus 3 Not Detected     Parainfluenza Virus 4 Not Detected     RSV, PCR Not Detected     Bordetella pertussis pcr Not Detected     Bordetella parapertussis PCR Not Detected     Chlamydophila pneumoniae PCR Not Detected     Mycoplasma pneumo by PCR Detected    Narrative:      In the setting of a positive respiratory panel with a viral infection PLUS a negative procalcitonin without other underlying concern for bacterial infection, consider observing off antibiotics or discontinuation of antibiotics and continue supportive care. If the respiratory panel is positive for atypical bacterial infection (Bordetella pertussis, Chlamydophila pneumoniae, or Mycoplasma pneumoniae), consider antibiotic de-escalation to target atypical bacterial infection.    High Sensitivity Troponin T [[id number]]  (Abnormal) Collected: 12/10/24 1714    Specimen: Blood Updated: 12/10/24 1807     HS Troponin T 48 ng/L     Narrative:      High Sensitive Troponin T Reference Range:  <14.0 ng/L- Negative Female for AMI  <22.0 ng/L- Negative Male for AMI  >=14 - Abnormal Female indicating possible myocardial injury.  >=22 - Abnormal Male indicating possible myocardial injury.   Clinicians would have to utilize clinical acumen, EKG, Troponin, and serial changes to determine if it is an Acute Myocardial Infarction or myocardial injury due to an underlying chronic condition.         BNP [[id number]]  (Abnormal) Collected: 12/10/24 1714    Specimen: Blood Updated: 12/10/24 1807     proBNP 10,957.0 pg/mL     Narrative:      This assay is used as an aid in the diagnosis of individuals suspected of having heart failure. It can be used as an aid in the diagnosis  "of acute decompensated heart failure (ADHF) in patients presenting with signs and symptoms of ADHF to the emergency department (ED). In addition, NT-proBNP of <300 pg/mL indicates ADHF is not likely.    Age Range Result Interpretation  NT-proBNP Concentration (pg/mL:      <50             Positive            >450                   Gray                 300-450                    Negative             <300    50-75           Positive            >900                  Gray                300-900                  Negative            <300      >75             Positive            >1800                  Gray                300-1800                  Negative            <300    Procalcitonin [[id number]]  (Abnormal) Collected: 12/10/24 1714    Specimen: Blood Updated: 12/10/24 1807     Procalcitonin 1.94 ng/mL     Narrative:      As a Marker for Sepsis (Non-Neonates):    1. <0.5 ng/mL represents a low risk of severe sepsis and/or septic shock.  2. >2 ng/mL represents a high risk of severe sepsis and/or septic shock.    As a Marker for Lower Respiratory Tract Infections that require antibiotic therapy:    PCT on Admission    Antibiotic Therapy       6-12 Hrs later    >0.5                Strongly Recommended  >0.25 - <0.5        Recommended   0.1 - 0.25          Discouraged              Remeasure/reassess PCT  <0.1                Strongly Discouraged     Remeasure/reassess PCT    As 28 day mortality risk marker: \"Change in Procalcitonin Result\" (>80% or <=80%) if Day 0 (or Day 1) and Day 4 values are available. Refer to http://www.[facility]-pct-calculator.com    Change in PCT <=80%  A decrease of PCT levels below or equal to 80% defines a positive change in PCT test result representing a higher risk for 28-day all-cause mortality of patients diagnosed with severe sepsis for septic shock.    Change in PCT >80%  A decrease of PCT levels of more than 80% defines a negative change in PCT result representing a lower risk for 28-day " all-cause mortality of patients diagnosed with severe sepsis or septic shock.       Comprehensive Metabolic Panel [[id number]]  (Abnormal) Collected: 12/10/24 1714    Specimen: Blood Updated: 12/10/24 1801     Glucose 176 mg/dL      BUN 18 mg/dL      Creatinine 1.01 mg/dL      Sodium 135 mmol/L      Potassium 3.5 mmol/L      Chloride 99 mmol/L      CO2 22.8 mmol/L      Calcium 8.1 mg/dL      Total Protein 7.8 g/dL      Albumin 2.7 g/dL      ALT (SGPT) 39 U/L      AST (SGOT) 41 U/L      Alkaline Phosphatase 183 U/L      Total Bilirubin 0.6 mg/dL      Globulin 5.1 gm/dL      A/G Ratio 0.5 g/dL      BUN/Creatinine Ratio 17.8     Anion Gap 13.2 mmol/L      eGFR 58.2 mL/min/1.73     Narrative:      GFR Categories in Chronic Kidney Disease (CKD)      GFR Category          GFR (mL/min/1.73)    Interpretation  G1                     90 or greater         Normal or high (1)  G2                      60-89                Mild decrease (1)  G3a                   45-59                Mild to moderate decrease  G3b                   30-44                Moderate to severe decrease  G4                    15-29                Severe decrease  G5                    14 or less           Kidney failure          (1)In the absence of evidence of kidney disease, neither GFR category G1 or G2 fulfill the criteria for CKD.    eGFR calculation 2021 CKD-EPI creatinine equation, which does not include race as a factor    Lipase [[id number]]  (Normal) Collected: 12/10/24 1714    Specimen: Blood Updated: 12/10/24 1801     Lipase 15 U/L     Magnesium [[id number]]  (Normal) Collected: 12/10/24 1714    Specimen: Blood Updated: 12/10/24 1801     Magnesium 2.3 mg/dL     Lactic Acid, Plasma [[id number]]  (Abnormal) Collected: 12/10/24 1714    Specimen: Blood Updated: 12/10/24 1756     Lactate 2.8 mmol/L     D-dimer, Quantitative [[id number]]  (Abnormal) Collected: 12/10/24 1714    Specimen: Blood Updated: 12/10/24 1744     D-Dimer, Quantitative 2.44  "MCGFEU/mL     Narrative:      According to the assay 's published package insert, a normal (<0.50 MCGFEU/mL) D-dimer result in conjunction with a non-high clinical probability assessment, excludes deep vein thrombosis (DVT) and pulmonary embolism (PE) with high sensitivity.    D-dimer values increase with age and this can make VTE exclusion of an older population difficult. To address this, the [employer], based on best available evidence and recent guidelines, recommends that clinicians use age-adjusted D-dimer thresholds in patients greater than 50 years of age with: a) a low probability of PE who do not meet all Pulmonary Embolism Rule Out Criteria, or b) in those with intermediate probability of PE.   The formula for an age-adjusted D-dimer cut-off is \"age/100\".  For example, a 60 year old patient would have an age-adjusted cut-off of 0.60 MCGFEU/mL and an 80 year old 0.80 MCGFEU/mL.    CBC & Differential [[id number]]  (Abnormal) Collected: 12/10/24 1714    Specimen: Blood Updated: 12/10/24 1731    Narrative:      The following orders were created for panel order CBC & Differential.  Procedure                               Abnormality         Status                     ---------                               -----------         ------                     CBC Auto Differential[[id number]]        Abnormal            Final result                 Please view results for these tests on the individual orders.    CBC Auto Differential [[id number]]  (Abnormal) Collected: 12/10/24 1714    Specimen: Blood Updated: 12/10/24 1731     WBC 18.58 10*3/mm3      RBC 3.55 10*6/mm3      Hemoglobin 10.3 g/dL      Hematocrit 29.8 %      MCV 83.9 fL      MCH 29.0 pg      MCHC 34.6 g/dL      RDW 13.8 %      RDW-SD 42.2 fl      MPV 9.7 fL      Platelets 602 10*3/mm3      Neutrophil % 90.9 %      Lymphocyte % 3.0 %      Monocyte % 4.0 %      Eosinophil % 0.1 %      Basophil % 0.3 %      Immature Grans % 1.7 " %      Neutrophils, Absolute 16.90 10*3/mm3      Lymphocytes, Absolute 0.56 10*3/mm3      Monocytes, Absolute 0.75 10*3/mm3      Eosinophils, Absolute 0.01 10*3/mm3      Basophils, Absolute 0.05 10*3/mm3      Immature Grans, Absolute 0.31 10*3/mm3      nRBC 0.0 /100 WBC                 Assessment & Plan       Mycoplasma pneumonia    Pulmonary embolism    Acute and chronic respiratory failure with hypoxia    LFTs abnormal    Sepsis due to pneumonia    Anemia, chronic disease    Schizophrenia      Assessment & Plan  CTA chest performed yesterday, 12/10/2024 reviewed which demonstrates PE in segmental left upper lobe pulmonary artery.  Extensive bilateral pulmonary infiltrates suggesting pneumonia.  Some areas of consolidation appear nodular and masslike in configuration especially at the lung apices.  There is a subcarinal masslike density measuring 2.7 cm x 3.1 cm.  Bilateral hilar mediastinal adenopathy.    Difficult to distinguish subcarinal masslike density.  Possible reactive adenopathy although unable to exclude malignant etiology.  She denies any esophageal dysphagia.  At minimum would require short interval CT imaging to assess for regression.  Consider outpatient PET to better characterize.  Will discuss with on-call surgeon, further recommendations to follow.    Patient receiving treatment for acute issues.  Patient now on antibiotics for mycoplasma pneumoniae per primary. Cardiology following for systolic cardiomyopathy of unknown etiology and PE.  Holding off on diuretics as she does not appear overloaded and she has been started on Lopressor.  She is on therapeutic Lovenox for PE.     I discussed the patient's findings and our recommendations with patient, family, and nursing staff    Thank you for this consult and allowing us to participate in the care of your patient.  We will follow along with you during this hospitalization.     AARON Browne  [facility]  12/11/24  16:28  EST    I have spent greater than 75 minutes reviewing the patient's chart including medical history, notes, radiographic images, labs, and performing assessment and development of a plan and discussion with the patient/family at bedside.;

## 2024-12-11 NOTE — ED NOTES
"Nursing report ED to floor  Alanna Machuca  74 y.o.  female    HPI :  HPI  Stated Reason for Visit: cough x1 day  History Obtained From: family    Chief Complaint  Chief Complaint   Patient presents with    Cough   Alanna Machuca is a 74 y.o. female who presents to the ED via private vehicle from doctor's office c/o acute cough and shortness of breath since yesterday.  Was found to be hypoxic at doctor's office today in the 80s, requiring supplemental O2 to get her into the 90s.  Patient is had a cough since yesterday, no significant chest pain.  No reported fevers, no vomiting or diarrhea.  Caregiver states has a remote history of CHF but not really anything since then.  Patient has a history of schizophrenia.       Admitting doctor:   Carmel Sawant MD    Admitting diagnosis:   The primary encounter diagnosis was Pneumonia of both lungs due to Mycoplasma pneumoniae, unspecified part of lung. Diagnoses of Other acute pulmonary embolism, unspecified whether acute cor pulmonale present, Hypoxia, and Elevated brain natriuretic peptide (BNP) level were also pertinent to this visit.    Code status:   Current Code Status       Date Active Code Status Order ID Comments User Context       Not on file            Allergies:   Patient has no known allergies.    Isolation:   Droplet    Intake and Output  No intake or output data in the 24 hours ending 12/10/24 2110    Weight:       12/10/24  2030   Weight: 52.2 kg (115 lb)       Most recent vitals:   Vitals:    12/10/24 2030 12/10/24 2031 12/10/24 2101 12/10/24 2102   BP:  115/73 107/77    Pulse:  (!) 129  (!) 127   Resp:       Temp:       TempSrc:       SpO2:  100%  95%   Weight: 52.2 kg (115 lb)      Height: 157.5 cm (62\")          Active LDAs/IV Access:   Lines, Drains & Airways       Active LDAs       Name Placement date Placement time Site Days    Peripheral IV 12/10/24 1710 Right Antecubital 12/10/24  1710  Antecubital  less than 1                    Labs (abnormal " labs have a star):   Labs Reviewed   RESPIRATORY PANEL PCR W/ COVID-19 (SARS-COV-2), NP SWAB IN UTM/VTP, 2 HR TAT - Abnormal; Notable for the following components:       Result Value    Mycoplasma pneumo by PCR Detected (*)     All other components within normal limits    Narrative:     In the setting of a positive respiratory panel with a viral infection PLUS a negative procalcitonin without other underlying concern for bacterial infection, consider observing off antibiotics or discontinuation of antibiotics and continue supportive care. If the respiratory panel is positive for atypical bacterial infection (Bordetella pertussis, Chlamydophila pneumoniae, or Mycoplasma pneumoniae), consider antibiotic de-escalation to target atypical bacterial infection.   COMPREHENSIVE METABOLIC PANEL - Abnormal; Notable for the following components:    Glucose 176 (*)     Creatinine 1.01 (*)     Sodium 135 (*)     Calcium 8.1 (*)     Albumin 2.7 (*)     ALT (SGPT) 39 (*)     AST (SGOT) 41 (*)     Alkaline Phosphatase 183 (*)     eGFR 58.2 (*)     All other components within normal limits    Narrative:     GFR Categories in Chronic Kidney Disease (CKD)      GFR Category          GFR (mL/min/1.73)    Interpretation  G1                     90 or greater         Normal or high (1)  G2                      60-89                Mild decrease (1)  G3a                   45-59                Mild to moderate decrease  G3b                   30-44                Moderate to severe decrease  G4                    15-29                Severe decrease  G5                    14 or less           Kidney failure          (1)In the absence of evidence of kidney disease, neither GFR category G1 or G2 fulfill the criteria for CKD.    eGFR calculation 2021 CKD-EPI creatinine equation, which does not include race as a factor   BNP (IN-HOUSE) - Abnormal; Notable for the following components:    proBNP 10,957.0 (*)     All other components within normal  "limits    Narrative:     This assay is used as an aid in the diagnosis of individuals suspected of having heart failure. It can be used as an aid in the diagnosis of acute decompensated heart failure (ADHF) in patients presenting with signs and symptoms of ADHF to the emergency department (ED). In addition, NT-proBNP of <300 pg/mL indicates ADHF is not likely.    Age Range Result Interpretation  NT-proBNP Concentration (pg/mL:      <50             Positive            >450                   Gray                 300-450                    Negative             <300    50-75           Positive            >900                  Gray                300-900                  Negative            <300      >75             Positive            >1800                  Gray                300-1800                  Negative            <300   D-DIMER, QUANTITATIVE - Abnormal; Notable for the following components:    D-Dimer, Quantitative 2.44 (*)     All other components within normal limits    Narrative:     According to the assay 's published package insert, a normal (<0.50 MCGFEU/mL) D-dimer result in conjunction with a non-high clinical probability assessment, excludes deep vein thrombosis (DVT) and pulmonary embolism (PE) with high sensitivity.    D-dimer values increase with age and this can make VTE exclusion of an older population difficult. To address this, the [employer], based on best available evidence and recent guidelines, recommends that clinicians use age-adjusted D-dimer thresholds in patients greater than 50 years of age with: a) a low probability of PE who do not meet all Pulmonary Embolism Rule Out Criteria, or b) in those with intermediate probability of PE.   The formula for an age-adjusted D-dimer cut-off is \"age/100\".  For example, a 60 year old patient would have an age-adjusted cut-off of 0.60 MCGFEU/mL and an 80 year old 0.80 MCGFEU/mL.   TROPONIN - Abnormal; Notable for the " "following components:    HS Troponin T 48 (*)     All other components within normal limits    Narrative:     High Sensitive Troponin T Reference Range:  <14.0 ng/L- Negative Female for AMI  <22.0 ng/L- Negative Male for AMI  >=14 - Abnormal Female indicating possible myocardial injury.  >=22 - Abnormal Male indicating possible myocardial injury.   Clinicians would have to utilize clinical acumen, EKG, Troponin, and serial changes to determine if it is an Acute Myocardial Infarction or myocardial injury due to an underlying chronic condition.        PROCALCITONIN - Abnormal; Notable for the following components:    Procalcitonin 1.94 (*)     All other components within normal limits    Narrative:     As a Marker for Sepsis (Non-Neonates):    1. <0.5 ng/mL represents a low risk of severe sepsis and/or septic shock.  2. >2 ng/mL represents a high risk of severe sepsis and/or septic shock.    As a Marker for Lower Respiratory Tract Infections that require antibiotic therapy:    PCT on Admission    Antibiotic Therapy       6-12 Hrs later    >0.5                Strongly Recommended  >0.25 - <0.5        Recommended   0.1 - 0.25          Discouraged              Remeasure/reassess PCT  <0.1                Strongly Discouraged     Remeasure/reassess PCT    As 28 day mortality risk marker: \"Change in Procalcitonin Result\" (>80% or <=80%) if Day 0 (or Day 1) and Day 4 values are available. Refer to http://www.[facility]-pct-calculator.com    Change in PCT <=80%  A decrease of PCT levels below or equal to 80% defines a positive change in PCT test result representing a higher risk for 28-day all-cause mortality of patients diagnosed with severe sepsis for septic shock.    Change in PCT >80%  A decrease of PCT levels of more than 80% defines a negative change in PCT result representing a lower risk for 28-day all-cause mortality of patients diagnosed with severe sepsis or septic shock.      LACTIC ACID, PLASMA - Abnormal; Notable " for the following components:    Lactate 2.8 (*)     All other components within normal limits   CBC WITH AUTO DIFFERENTIAL - Abnormal; Notable for the following components:    WBC 18.58 (*)     RBC 3.55 (*)     Hemoglobin 10.3 (*)     Hematocrit 29.8 (*)     Platelets 602 (*)     Neutrophil % 90.9 (*)     Lymphocyte % 3.0 (*)     Monocyte % 4.0 (*)     Eosinophil % 0.1 (*)     Immature Grans % 1.7 (*)     Neutrophils, Absolute 16.90 (*)     Lymphocytes, Absolute 0.56 (*)     Immature Grans, Absolute 0.31 (*)     All other components within normal limits   LIPASE - Normal   MAGNESIUM - Normal   LACTIC ACID, REFLEX - Normal   HIGH SENSITIVITIY TROPONIN T 1HR   CBC AND DIFFERENTIAL    Narrative:     The following orders were created for panel order CBC & Differential.  Procedure                               Abnormality         Status                     ---------                               -----------         ------                     CBC Auto Differential[[id number]]        Abnormal            Final result                 Please view results for these tests on the individual orders.       EKG:   ECG 12 Lead Tachycardia   Final Result   HEART FOJS=317  bpm   RR Ncaodnxt=828  ms   TN Lnemuufj=054  ms   P Horizontal Axis=  deg   P Front Axis=38  deg   QRSD Interval=63  ms   QT Lbmbbmcf=537  ms   BViO=565  ms   QRS Axis=74  deg   T Wave Axis=99  deg   - ABNORMAL ECG -   Supraventricular tachycardia   Nonspecific  T abnormalities, lateral leads   No Prior Tracing for Comparison   Electronically Signed By: Dewayne Carbajal ([facility]) 2024-12-10 20:27:39   Date and Time of Study:2024-12-10 17:13:46          Meds given in ED:   Medications   azithromycin (ZITHROMAX) tablet 500 mg (500 mg Oral Given 12/10/24 1911)   iopamidol (ISOVUE-370) 76 % injection 100 mL (55 mL Intravenous Given 12/10/24 1930)   Enoxaparin Sodium (LOVENOX) syringe 50 mg (50 mg Subcutaneous Given 12/10/24 2103)       Imaging results:  CT Angiogram Chest  Pulmonary Embolism    Result Date: 12/10/2024   1. Pulmonary embolism in segmental left upper lobe pulmonary artery, coronal image 85. RV LV ratio is measured at 1. Extensive bilateral pulmonary infiltrates suggesting pneumonia, possibility of underlying lesion/neoplasm not excluded, clinical correlation and follow-up/further evaluation advised as indicated.  2. Subcarinal masslike density, could be evidence of neoplasm, possibly involving the esophagus. Bilateral hilar and mediastinal adenopathy. Advise further evaluation, that could include endoscopy, PET/CT.   Discussed by telephone with Dr. Armstrong at , 12/10/2024.  This report was finalized on 12/10/2024 8:27 PM by Dr. Bc Luna M.D on Workstation: [employer]      XR Chest 1 View    Result Date: 12/10/2024  As described.  This report was finalized on 12/10/2024 6:06 PM by Dr. Bc Luna M.D on Workstation: [employer]       Ambulatory status:   - bedrest    Social issues:   Social History     Socioeconomic History    Marital status: Single       Peripheral Neurovascular  Peripheral Neurovascular (Adult)  Peripheral Neurovascular WDL: WDL    Neuro Cognitive  Neuro Cognitive (Adult)  Cognitive/Neuro/Behavioral WDL: .WDL except, all  Level of Consciousness: Alert  Arousal Level: opens eyes spontaneously  Orientation: disoriented x 4, place, time, situation    Learning  Learning Assessment  Learning Readiness and Ability: cognitive limitation noted    Respiratory  Respiratory  Airway WDL: WDL  Respiratory WDL  Respiratory WDL: .WDL except, all, cough  Cough Frequency: frequent  Cough Type: good, congested, productive    Abdominal Pain       Pain Assessments  Pain (Adult)  Preferred Pain Scale: PAINAD (Pain Assessment in Advance Dementia Scale)  PAINAD Breathin-->normal  PAINAD Negative Vocalization: 1-->occasional moan/groan, low speech, negative/disapproving quality  PAINAD Facial Expression: 0-->smiling or inexpressive  PAINAD Body Language:  1-->tense, distressed pacing, fidgeting  PAINAD Consolability: 0-->no need to console  PAINAD Score: 2    NIH Stroke Scale       Lucero Cho RN  12/10/24 21:10 EST

## 2024-12-11 NOTE — H&P
HISTORY AND PHYSICAL   [facility]        Date of Admission: 12/10/2024  Patient Identification:  Name: Alanna Machuca  Age: 74 y.o.  Sex: female  :  1950  MRN: [id number]                     Primary Care Physician: Provider, No Known    Chief Complaint: Shortness of breath    History of Present Illness:   This Sven is a 74-year-old female who is not the best of historians by any means.  I cannot get much history out of her that is reliable.  Case has been discussed with RN.  She has a caretaker as well as a legal guardian.  She was brought to this hospital for low O2 readings at a doctor's office in 85%.  She does not complain to me of any shortness of breath but she does have a mycoplasma pneumonia compounded by a new abnormal echocardiogram.  She has received IVF and then transition to IV Lasix and current oxygenation is much more reassuring at 97% on just 1 L NC.  There are past history is of schizophrenia of which she has managed on Seroquel.  Again I cannot get any reliable history out of this patient as she is not aware of location or aware of where she lives but seems to be sitting up calm conversational answering questions that she is capable of which are minimal.  She is feeding herself breakfast with no GI aversion.  No family and/or caretakers or guardians at bedside.  Case discussed at length with managing RN.    Past Medical History:  History reviewed. No pertinent past medical history.  Past Surgical History:  History reviewed. No pertinent surgical history.   Home Meds:  Medications Prior to Admission   Medication Sig Dispense Refill Last Dose/Taking    alendronate (FOSAMAX) 70 MG tablet Take 1 tablet by mouth Every 7 (Seven) Days.   Taking    levocetirizine (XYZAL) 5 MG tablet Take 1 tablet by mouth Every Evening.   Taking    pantoprazole (PROTONIX) 40 MG EC tablet Take 1 tablet by mouth Daily.   Taking    QUEtiapine (SEROquel) 200 MG tablet Take 1 tablet by mouth 2 (Two)  "Times a Day.   Taking       Allergies:  No Known Allergies  Immunizations:    There is no immunization history on file for this patient.  Social History:   Social History     Social History Narrative    Not on file     Social History     Socioeconomic History    Marital status: Single   Tobacco Use    Smoking status: Unknown   Vaping Use    Vaping status: Never Used   Substance and Sexual Activity    Alcohol use: Defer    Drug use: Defer    Sexual activity: Defer       Family History:  History reviewed. No pertinent family history.     Review of Systems  See history of present illness and past medical history.  Unobtainable and unreliable    Objective:  T Max 24 hrs: Temp (24hrs), Av °F (36.7 °C), Min:97.7 °F (36.5 °C), Max:98.1 °F (36.7 °C)    Vitals Ranges:   Temp:  [97.7 °F (36.5 °C)-98.1 °F (36.7 °C)] 98.1 °F (36.7 °C)  Heart Rate:  [113-152] 120  Resp:  [22] 22  BP: (100-150)/(64-92) 120/79      Exam:  /79   Pulse 120   Temp 98.1 °F (36.7 °C) (Oral)   Resp 22   Ht 157.5 cm (62\")   Wt 36.9 kg (81 lb 5.6 oz)   SpO2 97%   BMI 14.88 kg/m²     General Appearance:    Alert, very chronically ill-appearing, fluent speech, seems mentally challenged.  Sickly.  No family present   Head:    Normocephalic, without obvious abnormality, atraumatic   Eyes:    PERRL, conjunctivae/corneas clear, EOM's intact, both eyes   Ears:    Normal external ear canals, both ears   Nose:   Nares normal, septum midline, mucosa normal, no drainage    or sinus tenderness   Throat: Very poor dentition, MMM   Neck:   Supple, no meningismus or JVD       Lungs:   Diminished with Rales to auscultation bilaterally, respirations unlabored at this time   Chest Wall:    No tenderness or deformity    Heart:  Tachycardic rate and rhythm, S1 and S2 normal   Abdomen:     Soft, nontender, bowel sounds active all four quadrants   Extremities: Volume status currently euvolemic with no edema   Pulses:   2+ and symmetric all extremities         "   Neurologic:   CNII-XII intact, moving all while in bed      .    Data Review:  Labs in chart were reviewed.             Imaging Results (All)       Procedure Component Value Units Date/Time    CT Angiogram Chest Pulmonary Embolism [[id number]] Collected: 12/10/24 2011     Updated: 12/10/24 2030    Narrative:      CT ANGIOGRAM CHEST PULMONARY EMBOLISM-     INDICATIONS: Hypoxia     TECHNIQUE: Radiation dose reduction techniques were utilized, including  automated exposure control and exposure modulation based on body size.  CT angiography of the chest. Three-dimensional reconstructions.     COMPARISON: None available      FINDINGS:     Pulmonary embolism in segmental left upper lobe pulmonary artery,  coronal image 85. RV LV ratio is measured at 1. No aortic dissection.     The heart size is normal without pericardial effusion. Several  mediastinal and bilateral hilar lymph nodes are demonstrated, some of  which are mildly prominent, nonspecific, for example left paratracheal,  1.2 cm short axis, axial image 38, could be reactive in nature or  potentially evidence of neoplasm. Masslike density in the subcarinal  region, 2.7 cm x 3.1 cm on axial image 59 is difficult to distinguish  from the esophagus at this level, may represent pathologic adenopathy,  possibility of an esophageal mass is not excluded, endoscopy and PET/CT  correlation advised as indicated.     The airways appear clear. Bronchiectasis is present in both lungs.     Trace right pleural effusion.           The lungs show extensive bilateral infiltrates suggesting multifocal  pneumonia; possibly, edema could also contribute to this appearance.  Some areas of consolidation appear nodular or masslike in configuration,  especially at the lung apices, possibility of underlying  lesions/malignancy not excluded, CT follow-up recommended, PET/CT  correlation can be obtained as indicated.     Upper abdominal structures show no acute findings. Mild hiatal hernia  is  present..     Degenerative changes are seen in the spine. No acute fracture is  identified.       Impression:         1. Pulmonary embolism in segmental left upper lobe pulmonary artery,  coronal image 85. RV LV ratio is measured at 1. Extensive bilateral  pulmonary infiltrates suggesting pneumonia, possibility of underlying  lesion/neoplasm not excluded, clinical correlation and follow-up/further  evaluation advised as indicated.     2. Subcarinal masslike density, could be evidence of neoplasm, possibly  involving the esophagus. Bilateral hilar and mediastinal adenopathy.  Advise further evaluation, that could include endoscopy, PET/CT.        Discussed by telephone with Dr. Armstrong at 2024, 12/10/2024.     This report was finalized on 12/10/2024 8:27 PM by Dr. Bc Luna M.D on Workstation: [employer]       XR Chest 1 View [[id number]] Collected: 12/10/24 1805     Updated: 12/10/24 1809    Narrative:      XR CHEST 1 VW-     HISTORY: Female who is 74 years-old, hypoxia     TECHNIQUE: Frontal view of the chest     COMPARISON: None available     FINDINGS: The heart size is normal. Diffuse bilateral infiltrates may  represent edema an/or pneumonia, follow-up recommended. No large pleural  effusion. No pneumothorax. No acute osseous process.       Impression:      As described.     This report was finalized on 12/10/2024 6:06 PM by Dr. Bc Luna M.D on Workstation: [employer]                 Assessment:  Active Hospital Problems    Diagnosis  POA    **Mycoplasma pneumonia [J15.7]  Unknown    Acute and chronic respiratory failure with hypoxia [J96.21]  Unknown    LFTs abnormal [R79.89]  Unknown    Sepsis due to pneumonia [J18.9, A41.9]  Unknown    Anemia, chronic disease [D63.8]  Unknown    Schizophrenia [F20.9]  Unknown    Pulmonary embolism [I26.99]  Yes      Resolved Hospital Problems   No resolved problems to display.       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  Will ask thoracic surgery to evaluate   -Tachycardia with sepsis admission.  Status post IVF initially then received 40 mg IV Lasix.  Lactate has improved/sepsis resolving though tachycardia still persistent.  Will try to replace with some gentle maintenance IVF.  Cardiology consultation placed and echo was pending.  A certain amount of tachycardia is likely to be expected given her mentation and current clinical situation/hospitalization   -Blood cultures x 2 pending.  Leukocytosis pronounced      Abnormal LFTs likely reactive -trend CMP for now    ACD -trend CBC    Hypokalemia-replace    GERD-PPI    Schizophrenia-Seroquel        Further recommendations to follow as clinical course unfolds      Addendum -echo is now back and quite abnormal.  EF 31 to 35% with wall hypokinesis.  Previous maintenance IVF discontinued to avoid exacerbation as we await cardiology input..  Will likely need p.o. diuretics initiated.  Current volume status reassuring    González Esparza MD  12/11/2024  08:23 EST

## 2024-12-11 NOTE — PROGRESS NOTES
"[facility] Clinical Pharmacy Services: Enoxaparin Consult    Alanna Machuca has a pharmacy consult to dose full-dose enoxaparin per Adali León's request.     Indication: DVT/PE (active thrombosis)  Home Anticoagulation: none     Relevant clinical data and objective history reviewed:  74 y.o. female 157.5 cm (62\") 36.9 kg (81 lb 5.6 oz)   Body mass index is 14.88 kg/m².   Results from last 7 days   Lab Units 12/11/24  0311   PLATELETS 10*3/mm3 601*     Estimated Creatinine Clearance: 35.8 mL/min (by C-G formula based on SCr of 0.79 mg/dL).    Assessment/Plan  Weight lower than reordered on initial consult  Will change patient on Lovenox  40 mg (1mg/kg) subcutaneous every 12 hours, adjusted for renal function. Consult order will be discontinued but pharmacy will continue to follow.     Lisa Blank [facility]  Clinical Pharmacist     "

## 2024-12-11 NOTE — CASE MANAGEMENT/SOCIAL WORK
Discharge Planning Assessment  [facility]     Patient Name: Alanna Machuca  MRN: [id number]  Today's Date: 12/10/2024    Admit Date: 12/10/2024        Discharge Needs Assessment    No documentation.                  Discharge Plan       Row Name 12/10/24 4835       Plan    Plan Comments Notified by registration that the patient has a legal guardian.  [facility] is in place and Case Managment Manager, Hillary, notified.  We do not have Guardianship paperwork uploaded into Epic.  NERISSA Schmitz RN                  Continued Care and Services - Admitted Since 12/10/2024    No active coordination exists for this encounter.          Demographic Summary    No documentation.                  Functional Status    No documentation.                  Psychosocial    No documentation.                  Abuse/Neglect    No documentation.                  Legal    No documentation.                  Substance Abuse    No documentation.                  Patient Forms    No documentation.                     Elizabeth Vargas RN

## 2024-12-11 NOTE — PROGRESS NOTES
"[facility] Clinical Pharmacy Services: Enoxaparin Consult    Alanna Machuca has a pharmacy consult to dose full-dose enoxaparin per Adali León's request.     Indication: DVT/PE (active thrombosis)  Home Anticoagulation: none     Relevant clinical data and objective history reviewed:  74 y.o. female 157.5 cm (62\") 52.2 kg (115 lb)   Body mass index is 21.03 kg/m².   Results from last 7 days   Lab Units 12/10/24  1714   PLATELETS 10*3/mm3 602*     Estimated Creatinine Clearance: 39.7 mL/min (A) (by C-G formula based on SCr of 1.01 mg/dL (H)).    Assessment/Plan    Will start patient on  50 mg   (1mg/kg) subcutaneous every 12 hours, adjusted for renal function. Consult order will be discontinued but pharmacy will continue to follow.     Oli Rock [facility]  Clinical Pharmacist   "

## 2024-12-12 LAB
ALBUMIN SERPL-MCNC: 2.4 G/DL (ref 3.5–5.2)
ALBUMIN/GLOB SERPL: 0.6 G/DL
ALP SERPL-CCNC: 147 U/L (ref 39–117)
ALT SERPL W P-5'-P-CCNC: 30 U/L (ref 1–33)
ANION GAP SERPL CALCULATED.3IONS-SCNC: 12 MMOL/L (ref 5–15)
AST SERPL-CCNC: 48 U/L (ref 1–32)
BILIRUB SERPL-MCNC: 0.4 MG/DL (ref 0–1.2)
BUN SERPL-MCNC: 18 MG/DL (ref 8–23)
BUN/CREAT SERPL: 23.7 (ref 7–25)
CALCIUM SPEC-SCNC: 7.6 MG/DL (ref 8.6–10.5)
CHLORIDE SERPL-SCNC: 103 MMOL/L (ref 98–107)
CO2 SERPL-SCNC: 26 MMOL/L (ref 22–29)
CREAT SERPL-MCNC: 0.76 MG/DL (ref 0.57–1)
DEPRECATED RDW RBC AUTO: 40.8 FL (ref 37–54)
EGFRCR SERPLBLD CKD-EPI 2021: 81.8 ML/MIN/1.73
ERYTHROCYTE [DISTWIDTH] IN BLOOD BY AUTOMATED COUNT: 13.8 % (ref 12.3–15.4)
GLOBULIN UR ELPH-MCNC: 3.8 GM/DL
GLUCOSE SERPL-MCNC: 109 MG/DL (ref 65–99)
HCT VFR BLD AUTO: 26.6 % (ref 34–46.6)
HGB BLD-MCNC: 9.2 G/DL (ref 12–15.9)
MCH RBC QN AUTO: 28.3 PG (ref 26.6–33)
MCHC RBC AUTO-ENTMCNC: 34.6 G/DL (ref 31.5–35.7)
MCV RBC AUTO: 81.8 FL (ref 79–97)
PLATELET # BLD AUTO: 452 10*3/MM3 (ref 140–450)
PMV BLD AUTO: 10 FL (ref 6–12)
POTASSIUM SERPL-SCNC: 3.1 MMOL/L (ref 3.5–5.2)
PROT SERPL-MCNC: 6.2 G/DL (ref 6–8.5)
RBC # BLD AUTO: 3.25 10*6/MM3 (ref 3.77–5.28)
SODIUM SERPL-SCNC: 141 MMOL/L (ref 136–145)
WBC NRBC COR # BLD AUTO: 14.04 10*3/MM3 (ref 3.4–10.8)

## 2024-12-12 PROCEDURE — 99232 SBSQ HOSP IP/OBS MODERATE 35: CPT

## 2024-12-12 PROCEDURE — 25010000002 AZITHROMYCIN PER 500 MG: Performed by: HOSPITALIST

## 2024-12-12 PROCEDURE — 99232 SBSQ HOSP IP/OBS MODERATE 35: CPT | Performed by: INTERNAL MEDICINE

## 2024-12-12 PROCEDURE — 85027 COMPLETE CBC AUTOMATED: CPT | Performed by: HOSPITALIST

## 2024-12-12 PROCEDURE — 97161 PT EVAL LOW COMPLEX 20 MIN: CPT

## 2024-12-12 PROCEDURE — 25010000002 CEFTRIAXONE PER 250 MG: Performed by: HOSPITALIST

## 2024-12-12 PROCEDURE — 25010000002 ENOXAPARIN PER 10 MG

## 2024-12-12 PROCEDURE — 25810000003 SODIUM CHLORIDE 0.9 % SOLUTION 250 ML FLEX CONT: Performed by: HOSPITALIST

## 2024-12-12 PROCEDURE — 25810000003 SODIUM CHLORIDE 0.9 % SOLUTION: Performed by: INTERNAL MEDICINE

## 2024-12-12 PROCEDURE — 80053 COMPREHEN METABOLIC PANEL: CPT | Performed by: HOSPITALIST

## 2024-12-12 RX ORDER — POTASSIUM CHLORIDE 750 MG/1
40 TABLET, FILM COATED, EXTENDED RELEASE ORAL EVERY 4 HOURS
Status: COMPLETED | OUTPATIENT
Start: 2024-12-12 | End: 2024-12-12

## 2024-12-12 RX ADMIN — ENOXAPARIN SODIUM 40 MG: 100 INJECTION SUBCUTANEOUS at 20:02

## 2024-12-12 RX ADMIN — QUETIAPINE FUMARATE 200 MG: 50 TABLET ORAL at 20:02

## 2024-12-12 RX ADMIN — AZITHROMYCIN MONOHYDRATE 500 MG: 500 INJECTION, POWDER, LYOPHILIZED, FOR SOLUTION INTRAVENOUS at 12:37

## 2024-12-12 RX ADMIN — QUETIAPINE FUMARATE 200 MG: 50 TABLET ORAL at 09:37

## 2024-12-12 RX ADMIN — SENNOSIDES AND DOCUSATE SODIUM 2 TABLET: 50; 8.6 TABLET ORAL at 16:53

## 2024-12-12 RX ADMIN — METOPROLOL SUCCINATE 12.5 MG: 25 TABLET, EXTENDED RELEASE ORAL at 09:38

## 2024-12-12 RX ADMIN — PANTOPRAZOLE SODIUM 40 MG: 40 TABLET, DELAYED RELEASE ORAL at 09:38

## 2024-12-12 RX ADMIN — ENOXAPARIN SODIUM 40 MG: 100 INJECTION SUBCUTANEOUS at 09:37

## 2024-12-12 RX ADMIN — POTASSIUM CHLORIDE 40 MEQ: 750 TABLET, EXTENDED RELEASE ORAL at 09:38

## 2024-12-12 RX ADMIN — POTASSIUM CHLORIDE 40 MEQ: 750 TABLET, EXTENDED RELEASE ORAL at 16:53

## 2024-12-12 RX ADMIN — SODIUM CHLORIDE 500 ML: 9 INJECTION, SOLUTION INTRAVENOUS at 14:49

## 2024-12-12 RX ADMIN — CEFTRIAXONE SODIUM 1000 MG: 1 INJECTION, POWDER, FOR SOLUTION INTRAMUSCULAR; INTRAVENOUS at 09:38

## 2024-12-12 NOTE — PAYOR COMM NOTE
"Alanna Castellanos (74 y.o. Female)      SEE FOR INPATIENT:  ID#  [id number]    TELEMETRY    [facility] DEPT: [phone]    [facility]: NPI [id number] [facility]# [id number]    GONZÁLEZ SIMON MD:  NPI- [id number]   [phone],  [phone]    J15.7, I26.99, J96.21, A41.9, D63.8    GERTRUDE ARVIZU RN,[facility]       Date of Birth   1950    Social Security Number       Address   [address] [address]    Home Phone   [phone]    MRN   [id number]       Christian   [employer]    Marital Status   Single                            Admission Date   12/10/24    Admission Type   Emergency    Admitting Provider   González Simon MD    Attending Provider   González Simon MD    Department, Room/Bed   [address], S412/1       Discharge Date       Discharge Disposition       Discharge Destination                                 Attending Provider: González Simon MD    Allergies: No Known Allergies    Isolation: Droplet   Infection: Mycoplasma pneumonia (12/10/24)   Code Status: CPR    Ht: 157.5 cm (62\")   Wt: 37.4 kg (82 lb 7.2 oz)    Admission Cmt: None   Principal Problem: Mycoplasma pneumonia [J15.7]                   Active Insurance as of 12/10/2024       Primary Coverage       Payor Plan Insurance Group Employer/Plan Group    PASSPORT MEDICARE ADVANTAGE PASSPORT ADVANTAGE [id number]       Payor Plan Address Payor Plan Phone Number Payor Plan Fax Number Effective Dates    [address]   1/1/2024 - None Entered    [place] [id number]         Subscriber Name Subscriber Birth Date Member ID       ALANNA CASTELLANOS 1950 [id number]               Secondary Coverage       Payor Plan Insurance Group Employer/Plan Group    [place]        Payor Plan Address Payor Plan Phone Number Payor Plan Fax Number Effective Dates    [address] [phone]  2/14/2024 - None Entered    [place] [id number]         Subscriber " Name Subscriber Birth Date Member ID       ALANNA MACHUCA 1950 [id number]                     Emergency Contacts        (Rel.) Home Phone Work Phone Mobile Phone    IRINA ALONZO (Legal Guardian) [phone] -- [phone]    taniabia (Care Giver) [phone] -- --                 History & Physical        González Esparza MD at 24 0818          HISTORY AND PHYSICAL   [facility]        Date of Admission: 12/10/2024  Patient Identification:  Name: Alanna Machuca  Age: 74 y.o.  Sex: female  :  1950  MRN: [id number]                     Primary Care Physician: Provider, No Known    Chief Complaint: Shortness of breath    History of Present Illness:   This Sven is a 74-year-old female who is not the best of historians by any means.  I cannot get much history out of her that is reliable.  Case has been discussed with RN.  She has a caretaker as well as a legal guardian.  She was brought to this hospital for low O2 readings at a doctor's office in 85%.  She does not complain to me of any shortness of breath but she does have a mycoplasma pneumonia compounded by a new abnormal echocardiogram.  She has received IVF and then transition to IV Lasix and current oxygenation is much more reassuring at 97% on just 1 L NC.  There are past history is of schizophrenia of which she has managed on Seroquel.  Again I cannot get any reliable history out of this patient as she is not aware of location or aware of where she lives but seems to be sitting up calm conversational answering questions that she is capable of which are minimal.  She is feeding herself breakfast with no GI aversion.  No family and/or caretakers or guardians at bedside.  Case discussed at length with managing RN.    Past Medical History:  History reviewed. No pertinent past medical history.  Past Surgical History:  History reviewed. No pertinent surgical history.   Home Meds:  Medications Prior to  "Admission   Medication Sig Dispense Refill Last Dose/Taking    alendronate (FOSAMAX) 70 MG tablet Take 1 tablet by mouth Every 7 (Seven) Days.   Taking    levocetirizine (XYZAL) 5 MG tablet Take 1 tablet by mouth Every Evening.   Taking    pantoprazole (PROTONIX) 40 MG EC tablet Take 1 tablet by mouth Daily.   Taking    QUEtiapine (SEROquel) 200 MG tablet Take 1 tablet by mouth 2 (Two) Times a Day.   Taking       Allergies:  No Known Allergies  Immunizations:    There is no immunization history on file for this patient.  Social History:   Social History     Social History Narrative    Not on file     Social History     Socioeconomic History    Marital status: Single   Tobacco Use    Smoking status: Unknown   Vaping Use    Vaping status: Never Used   Substance and Sexual Activity    Alcohol use: Defer    Drug use: Defer    Sexual activity: Defer       Family History:  History reviewed. No pertinent family history.     Review of Systems  See history of present illness and past medical history.  Unobtainable and unreliable    Objective:  T Max 24 hrs: Temp (24hrs), Av °F (36.7 °C), Min:97.7 °F (36.5 °C), Max:98.1 °F (36.7 °C)    Vitals Ranges:   Temp:  [97.7 °F (36.5 °C)-98.1 °F (36.7 °C)] 98.1 °F (36.7 °C)  Heart Rate:  [113-152] 120  Resp:  [22] 22  BP: (100-150)/(64-92) 120/79      Exam:  /79   Pulse 120   Temp 98.1 °F (36.7 °C) (Oral)   Resp 22   Ht 157.5 cm (62\")   Wt 36.9 kg (81 lb 5.6 oz)   SpO2 97%   BMI 14.88 kg/m²     General Appearance:    Alert, very chronically ill-appearing, fluent speech, seems mentally challenged.  Sickly.  No family present   Head:    Normocephalic, without obvious abnormality, atraumatic   Eyes:    PERRL, conjunctivae/corneas clear, EOM's intact, both eyes   Ears:    Normal external ear canals, both ears   Nose:   Nares normal, septum midline, mucosa normal, no drainage    or sinus tenderness   Throat: Very poor dentition, MMM   Neck:   Supple, no meningismus or JVD "       Lungs:   Diminished with Rales to auscultation bilaterally, respirations unlabored at this time   Chest Wall:    No tenderness or deformity    Heart:  Tachycardic rate and rhythm, S1 and S2 normal   Abdomen:     Soft, nontender, bowel sounds active all four quadrants   Extremities: Volume status currently euvolemic with no edema   Pulses:   2+ and symmetric all extremities           Neurologic:   CNII-XII intact, moving all while in bed      .    Data Review:  Labs in chart were reviewed.             Imaging Results (All)       Procedure Component Value Units Date/Time    CT Angiogram Chest Pulmonary Embolism [[id number]] Collected: 12/10/24 2011     Updated: 12/10/24 2030    Narrative:      CT ANGIOGRAM CHEST PULMONARY EMBOLISM-     INDICATIONS: Hypoxia     TECHNIQUE: Radiation dose reduction techniques were utilized, including  automated exposure control and exposure modulation based on body size.  CT angiography of the chest. Three-dimensional reconstructions.     COMPARISON: None available      FINDINGS:     Pulmonary embolism in segmental left upper lobe pulmonary artery,  coronal image 85. RV LV ratio is measured at 1. No aortic dissection.     The heart size is normal without pericardial effusion. Several  mediastinal and bilateral hilar lymph nodes are demonstrated, some of  which are mildly prominent, nonspecific, for example left paratracheal,  1.2 cm short axis, axial image 38, could be reactive in nature or  potentially evidence of neoplasm. Masslike density in the subcarinal  region, 2.7 cm x 3.1 cm on axial image 59 is difficult to distinguish  from the esophagus at this level, may represent pathologic adenopathy,  possibility of an esophageal mass is not excluded, endoscopy and PET/CT  correlation advised as indicated.     The airways appear clear. Bronchiectasis is present in both lungs.     Trace right pleural effusion.           The lungs show extensive bilateral infiltrates suggesting  multifocal  pneumonia; possibly, edema could also contribute to this appearance.  Some areas of consolidation appear nodular or masslike in configuration,  especially at the lung apices, possibility of underlying  lesions/malignancy not excluded, CT follow-up recommended, PET/CT  correlation can be obtained as indicated.     Upper abdominal structures show no acute findings. Mild hiatal hernia is  present..     Degenerative changes are seen in the spine. No acute fracture is  identified.       Impression:         1. Pulmonary embolism in segmental left upper lobe pulmonary artery,  coronal image 85. RV LV ratio is measured at 1. Extensive bilateral  pulmonary infiltrates suggesting pneumonia, possibility of underlying  lesion/neoplasm not excluded, clinical correlation and follow-up/further  evaluation advised as indicated.     2. Subcarinal masslike density, could be evidence of neoplasm, possibly  involving the esophagus. Bilateral hilar and mediastinal adenopathy.  Advise further evaluation, that could include endoscopy, PET/CT.        Discussed by telephone with Dr. Armstrong at 2024, 12/10/2024.     This report was finalized on 12/10/2024 8:27 PM by Dr. Bc Luna M.D on Workstation: [email or web address]       XR Chest 1 View [[id number]] Collected: 12/10/24 1805     Updated: 12/10/24 1809    Narrative:      XR CHEST 1 VW-     HISTORY: Female who is 74 years-old, hypoxia     TECHNIQUE: Frontal view of the chest     COMPARISON: None available     FINDINGS: The heart size is normal. Diffuse bilateral infiltrates may  represent edema an/or pneumonia, follow-up recommended. No large pleural  effusion. No pneumothorax. No acute osseous process.       Impression:      As described.     This report was finalized on 12/10/2024 6:06 PM by Dr. Bc Luna M.D on Workstation: [id number]                 Assessment:  Active Hospital Problems    Diagnosis  POA    **Mycoplasma pneumonia [J15.7]  Unknown    Acute and chronic  respiratory failure with hypoxia [J96.21]  Unknown    LFTs abnormal [R79.89]  Unknown    Sepsis due to pneumonia [J18.9, A41.9]  Unknown    Anemia, chronic disease [D63.8]  Unknown    Schizophrenia [F20.9]  Unknown    Pulmonary embolism [I26.99]  Yes      Resolved Hospital Problems   No resolved problems to display.       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  Will ask thoracic surgery to evaluate   -Tachycardia with sepsis admission.  Status post IVF initially then received 40 mg IV Lasix.  Lactate has improved/sepsis resolving though tachycardia still persistent.  Will try to replace with some gentle maintenance IVF.  Cardiology consultation placed and echo was pending.  A certain amount of tachycardia is likely to be expected given her mentation and current clinical situation/hospitalization   -Blood cultures x 2 pending.  Leukocytosis pronounced      Abnormal LFTs likely reactive -trend CMP for now    ACD -trend CBC    Hypokalemia-replace    GERD-PPI    Schizophrenia-Seroquel        Further recommendations to follow as clinical course unfolds      Addendum -echo is now back and quite abnormal.  EF 31 to 35% with wall hypokinesis.  Previous maintenance IVF discontinued to avoid exacerbation as we await cardiology input..  Will likely need p.o. diuretics initiated.  Current volume status reassuring    González Esparza MD  12/11/2024  08:23 EST      Electronically signed by González Esparza MD at 12/11/24 0956          Emergency Department Notes        Lucero Cho RN at 12/10/24 3293          Updated bia (caregiver) on plan of care.     Electronically signed by Lucero Cho RN at 12/10/24 4749        Lucero Cho RN at 12/10/24 2124          Attempted to call caregiver bia 2x to updated on plan of care no answer.    Electronically signed by Lucero Cho RN at 12/10/24 2125           Babatunde Armstrong MD at 12/10/24 1817        Procedure Orders    1. Critical Care [[id number]] ordered by Babatunde Armstrong MD                  EMERGENCY DEPARTMENT ENCOUNTER    Room Number:  25/25  PCP: Provider, No Known  Historian: Patient, caregiver      HPI:  Chief Complaint: Cough, shortness of breath  A complete HPI/ROS/PMH/PSH/SH/FH are unobtainable due to: None    Context: Alanna Machuca is a 74 y.o. female who presents to the ED via private vehicle from doctor's office c/o acute cough and shortness of breath since yesterday.  Was found to be hypoxic at doctor's office today in the 80s, requiring supplemental O2 to get her into the 90s.  Patient is had a cough since yesterday, no significant chest pain.  No reported fevers, no vomiting or diarrhea.  Caregiver states has a remote history of CHF but not really anything since then.  Patient has a history of schizophrenia.      MEDICAL RECORD REVIEW    External (non-ED) record review: No prior cardiac workup noted on chart review in epic              PAST MEDICAL HISTORY  Active Ambulatory Problems     Diagnosis Date Noted    No Active Ambulatory Problems     Resolved Ambulatory Problems     Diagnosis Date Noted    No Resolved Ambulatory Problems     No Additional Past Medical History         PAST SURGICAL HISTORY  No past surgical history on file.      FAMILY HISTORY  No family history on file.      SOCIAL HISTORY  Social History     Socioeconomic History    Marital status: Single         ALLERGIES  Patient has no known allergies.        REVIEW OF SYSTEMS  Review of Systems     All systems reviewed and negative except for those discussed in HPI.       PHYSICAL EXAM    I have reviewed the triage vital signs and nursing notes.    ED Triage Vitals   Temp Heart Rate Resp  BP SpO2   12/10/24 1703 12/10/24 1703 12/10/24 1708 12/10/24 1708 12/10/24 1703   97.7 °F (36.5 °C) (!) 146 22 114/75 (!) 85 %      Temp src Heart Rate Source Patient Position BP Location FiO2 (%)   12/10/24 1703 12/10/24 1703 -- -- --   Tympanic Monitor          Physical Exam  General: No acute distress, nontoxic  HEENT: Mucous membranes moist, atraumatic, EOMI  Neck: Full ROM  Pulm: Symmetric chest rise, mild, lungs slightly diminished bilaterally with scattered rales  Cardiovascular: Regular rhythm tachycardia, intact distal pulses, no significant peripheral edema  GI: Soft, nontender, nondistended, no rebound, no guarding, bowel sounds present  MSK: Full ROM, no deformity  Skin: Warm, dry  Neuro: Awake, alert, oriented x 4, GCS 15, moving all extremities, no focal deficits  Psych: Calm, cooperative      I wore an N95 mask, eye protection, and gloves used during this encounter.       LAB RESULTS  Recent Results (from the past 24 hours)   ECG 12 Lead Tachycardia    Collection Time: 12/10/24  5:13 PM   Result Value Ref Range    QT Interval 269 ms    QTC Interval 420 ms   Comprehensive Metabolic Panel    Collection Time: 12/10/24  5:14 PM    Specimen: Blood   Result Value Ref Range    Glucose 176 (H) 65 - 99 mg/dL    BUN 18 8 - 23 mg/dL    Creatinine 1.01 (H) 0.57 - 1.00 mg/dL    Sodium 135 (L) 136 - 145 mmol/L    Potassium 3.5 3.5 - 5.2 mmol/L    Chloride 99 98 - 107 mmol/L    CO2 22.8 22.0 - 29.0 mmol/L    Calcium 8.1 (L) 8.6 - 10.5 mg/dL    Total Protein 7.8 6.0 - 8.5 g/dL    Albumin 2.7 (L) 3.5 - 5.2 g/dL    ALT (SGPT) 39 (H) 1 - 33 U/L    AST (SGOT) 41 (H) 1 - 32 U/L    Alkaline Phosphatase 183 (H) 39 - 117 U/L    Total Bilirubin 0.6 0.0 - 1.2 mg/dL    Globulin 5.1 gm/dL    A/G Ratio 0.5 g/dL    BUN/Creatinine Ratio 17.8 7.0 - 25.0    Anion Gap 13.2 5.0 - 15.0 mmol/L    eGFR 58.2 (L) >60.0 mL/min/1.73   Lipase    Collection Time: 12/10/24  5:14 PM    Specimen: Blood   Result Value Ref Range    Lipase 15 13 - 60  U/L   BNP    Collection Time: 12/10/24  5:14 PM    Specimen: Blood   Result Value Ref Range    proBNP 10,957.0 (H) 0.0 - 1,800.0 pg/mL   D-dimer, Quantitative    Collection Time: 12/10/24  5:14 PM    Specimen: Blood   Result Value Ref Range    D-Dimer, Quantitative 2.44 (H) 0.00 - 0.75 MCGFEU/mL   High Sensitivity Troponin T    Collection Time: 12/10/24  5:14 PM    Specimen: Blood   Result Value Ref Range    HS Troponin T 48 (H) <14 ng/L   Procalcitonin    Collection Time: 12/10/24  5:14 PM    Specimen: Blood   Result Value Ref Range    Procalcitonin 1.94 (H) 0.00 - 0.25 ng/mL   Lactic Acid, Plasma    Collection Time: 12/10/24  5:14 PM    Specimen: Blood   Result Value Ref Range    Lactate 2.8 (C) 0.5 - 2.0 mmol/L   Magnesium    Collection Time: 12/10/24  5:14 PM    Specimen: Blood   Result Value Ref Range    Magnesium 2.3 1.6 - 2.4 mg/dL   CBC Auto Differential    Collection Time: 12/10/24  5:14 PM    Specimen: Blood   Result Value Ref Range    WBC 18.58 (H) 3.40 - 10.80 10*3/mm3    RBC 3.55 (L) 3.77 - 5.28 10*6/mm3    Hemoglobin 10.3 (L) 12.0 - 15.9 g/dL    Hematocrit 29.8 (L) 34.0 - 46.6 %    MCV 83.9 79.0 - 97.0 fL    MCH 29.0 26.6 - 33.0 pg    MCHC 34.6 31.5 - 35.7 g/dL    RDW 13.8 12.3 - 15.4 %    RDW-SD 42.2 37.0 - 54.0 fl    MPV 9.7 6.0 - 12.0 fL    Platelets 602 (H) 140 - 450 10*3/mm3    Neutrophil % 90.9 (H) 42.7 - 76.0 %    Lymphocyte % 3.0 (L) 19.6 - 45.3 %    Monocyte % 4.0 (L) 5.0 - 12.0 %    Eosinophil % 0.1 (L) 0.3 - 6.2 %    Basophil % 0.3 0.0 - 1.5 %    Immature Grans % 1.7 (H) 0.0 - 0.5 %    Neutrophils, Absolute 16.90 (H) 1.70 - 7.00 10*3/mm3    Lymphocytes, Absolute 0.56 (L) 0.70 - 3.10 10*3/mm3    Monocytes, Absolute 0.75 0.10 - 0.90 10*3/mm3    Eosinophils, Absolute 0.01 0.00 - 0.40 10*3/mm3    Basophils, Absolute 0.05 0.00 - 0.20 10*3/mm3    Immature Grans, Absolute 0.31 (H) 0.00 - 0.05 10*3/mm3    nRBC 0.0 0.0 - 0.2 /100 WBC   Respiratory Panel PCR w/COVID-19(SARS-CoV-2)  JEROMY/VIK/DONALDO/PAD/COR/DANA In-House, NP Swab in UTM/VTM, 2 HR TAT - Swab, Nasopharynx    Collection Time: 12/10/24  5:16 PM    Specimen: Nasopharynx; Swab   Result Value Ref Range    ADENOVIRUS, PCR Not Detected Not Detected    Coronavirus 229E Not Detected Not Detected    Coronavirus HKU1 Not Detected Not Detected    Coronavirus NL63 Not Detected Not Detected    Coronavirus OC43 Not Detected Not Detected    COVID19 Not Detected Not Detected - Ref. Range    Human Metapneumovirus Not Detected Not Detected    Human Rhinovirus/Enterovirus Not Detected Not Detected    Influenza A PCR Not Detected Not Detected    Influenza B PCR Not Detected Not Detected    Parainfluenza Virus 1 Not Detected Not Detected    Parainfluenza Virus 2 Not Detected Not Detected    Parainfluenza Virus 3 Not Detected Not Detected    Parainfluenza Virus 4 Not Detected Not Detected    RSV, PCR Not Detected Not Detected    Bordetella pertussis pcr Not Detected Not Detected    Bordetella parapertussis PCR Not Detected Not Detected    Chlamydophila pneumoniae PCR Not Detected Not Detected    Mycoplasma pneumo by PCR Detected (A) Not Detected   STAT Lactic Acid, Reflex    Collection Time: 12/10/24  8:19 PM    Specimen: Blood   Result Value Ref Range    Lactate 1.3 0.5 - 2.0 mmol/L       Ordered the above labs and independently interpreted results. My findings will be discussed in the medical decision making section below        RADIOLOGY  CT Angiogram Chest Pulmonary Embolism    Result Date: 12/10/2024  CT ANGIOGRAM CHEST PULMONARY EMBOLISM-  INDICATIONS: Hypoxia  TECHNIQUE: Radiation dose reduction techniques were utilized, including automated exposure control and exposure modulation based on body size. CT angiography of the chest. Three-dimensional reconstructions.  COMPARISON: None available  FINDINGS:  Pulmonary embolism in segmental left upper lobe pulmonary artery, coronal image 85. RV LV ratio is measured at 1. No aortic dissection.  The heart size  is normal without pericardial effusion. Several mediastinal and bilateral hilar lymph nodes are demonstrated, some of which are mildly prominent, nonspecific, for example left paratracheal, 1.2 cm short axis, axial image 38, could be reactive in nature or potentially evidence of neoplasm. Masslike density in the subcarinal region, 2.7 cm x 3.1 cm on axial image 59 is difficult to distinguish from the esophagus at this level, may represent pathologic adenopathy, possibility of an esophageal mass is not excluded, endoscopy and PET/CT correlation advised as indicated.  The airways appear clear. Bronchiectasis is present in both lungs.  Trace right pleural effusion.    The lungs show extensive bilateral infiltrates suggesting multifocal pneumonia; possibly, edema could also contribute to this appearance. Some areas of consolidation appear nodular or masslike in configuration, especially at the lung apices, possibility of underlying lesions/malignancy not excluded, CT follow-up recommended, PET/CT correlation can be obtained as indicated.  Upper abdominal structures show no acute findings. Mild hiatal hernia is present..  Degenerative changes are seen in the spine. No acute fracture is identified.       1. Pulmonary embolism in segmental left upper lobe pulmonary artery, coronal image 85. RV LV ratio is measured at 1. Extensive bilateral pulmonary infiltrates suggesting pneumonia, possibility of underlying lesion/neoplasm not excluded, clinical correlation and follow-up/further evaluation advised as indicated.  2. Subcarinal masslike density, could be evidence of neoplasm, possibly involving the esophagus. Bilateral hilar and mediastinal adenopathy. Advise further evaluation, that could include endoscopy, PET/CT.   Discussed by telephone with Dr. Armstrong at 2024, 12/10/2024.  This report was finalized on 12/10/2024 8:27 PM by Dr. Bc Luna M.D on Workstation: [employer]      XR Chest 1 View    Result Date:  12/10/2024  XR CHEST 1 VW-  HISTORY: Female who is 74 years-old, hypoxia  TECHNIQUE: Frontal view of the chest  COMPARISON: None available  FINDINGS: The heart size is normal. Diffuse bilateral infiltrates may represent edema an/or pneumonia, follow-up recommended. No large pleural effusion. No pneumothorax. No acute osseous process.      As described.  This report was finalized on 12/10/2024 6:06 PM by Dr. Bc Luna M.D on Workstation: [email or web address]       Ordered the above noted radiological studies.  Independently interpreted by me and my independent review of findings can be found in the ED Course.  See dictation for official radiology interpretation.      PROCEDURES    Critical Care    Performed by: Babatunde Armstrong MD  Authorized by: Babatunde Armstrong MD    Critical care provider statement:     Critical care time (minutes):  39    Critical care time was exclusive of:  Separately billable procedures and treating other patients and teaching time    Critical care was necessary to treat or prevent imminent or life-threatening deterioration of the following conditions:  Respiratory failure and cardiac failure    Critical care was time spent personally by me on the following activities:  Development of treatment plan with patient or surrogate, discussions with consultants, evaluation of patient's response to treatment, examination of patient, obtaining history from patient or surrogate, ordering and performing treatments and interventions, ordering and review of laboratory studies, ordering and review of radiographic studies, pulse oximetry, re-evaluation of patient's condition and review of old charts    Care discussed with: admitting provider          EKG - Per my independent interpretation at 1720:    EKG Time: 1713  Rhythm/Rate: Sinus tachycardia with a rate of 146  Normal axis  Normal intervals  Nonspecific T wave abnormalities  No STEMI     No prior for visual comparison      MEDICATIONS GIVEN IN  ER    Medications   azithromycin (ZITHROMAX) tablet 500 mg (500 mg Oral Given 12/10/24 1911)   iopamidol (ISOVUE-370) 76 % injection 100 mL (55 mL Intravenous Given 12/10/24 1930)   Enoxaparin Sodium (LOVENOX) syringe 50 mg (50 mg Subcutaneous Given 12/10/24 2103)         PROGRESS, DATA ANALYSIS, CONSULTS, AND MEDICAL DECISION MAKING    Please note that this section constitutes my independent interpretation of clinical data including lab results, radiology, EKG's.  This constitutes my independent professional opinion regarding differential diagnosis and management of this patient.  It may include any factors such as history from outside sources, review of external records, social determinants of health, management of medications, response to those treatments, and discussions with other providers.    Differential Diagnosis and Plan: Initial concern for viral process, community-acquired pneumonia, PE, heart failure, renal failure, electrolyte abnormalities, anemia, arrhythmia, among others.  Plan for labs, chest x-ray, EKG, supportive care with supplemental O2, and reevaluation with results.    Additional sources:  - Discussed/ obtained information from independent historians: Caregiver at bedside states has a remote history of CHF but nothing persistent since then     - (Social Determinants of Health): None     - Shared decision making:  Patient and caregiver at bedside fully updated on and in agreement with the course and plan moving forward    ED Course as of 12/10/24 2158   Tue Dec 10, 2024   1747 WBC(!): 18.58 [DC]   1747 Hemoglobin(!): 10.3 [DC]   1747 D-Dimer, Quant(!): 2.44 [DC]   1747 XR Chest 1 View  Independent interpretation of the chest x-ray, no evidence of pneumothorax, scattered opacities bilaterally right greater than left [DC]   1807 Lactate(!!): 2.8 [DC]   1807 Procalcitonin(!): 1.94 [DC]   1807 proBNP(!): 10,957.0  No prior [DC]   1807 HS Troponin T(!): 48 [DC]   1807 Lipase: 15 [DC]   1807  Glucose(!): 176 [DC]   1807 BUN: 18 [DC]   1807 Creatinine(!): 1.01  0.81 one year ago [DC]   1807 Sodium(!): 135 [DC]   1807 Potassium: 3.5 [DC]   1807 Albumin(!): 2.7 [DC]   1808 ALT (SGPT)(!): 39 [DC]   1808 AST (SGOT)(!): 41 [DC]   1808 Alkaline Phosphatase(!): 183 [DC]   1812 XR Chest 1 View [DC]   1853 Mycoplasma pneumo by PCR(!): Detected [DC]   2016 CT Angiogram Chest Pulmonary Embolism  Per my independent interpretation of the CT angiogram of the chest, diffuse interstitial opacities bilaterally, no pneumothorax, no overtly evident pulmonary embolism although will defer to final radiology report for any subtle findings.  Unclear if there is any superimposed edema component or if this is all more infectious will await final radiology report [DC]   2027 Discussed with Dr. Luna, Radiologist, patient has a small left upper lobe PE, has most likely pneumonia without significant edema component.  Has what could be a subcarinal lymphadenopathy versus mass. [DC]   2049 Discussed with  [DC]   2050 Discussed with AARON Cabello LHA, discussed patient's clinical course and findings today, treatment modalities, and need for hospitalization. [DC]   2051 Sepsis fluids withheld given concern for possible underlying volume overload component with BNP > 10,000 [DC]   2053 Patient with multiple issues today including mycoplasma pneumonia, small pulmonary embolism, potentially competent of heart failure although by radiography lung appearance more infectious rather than volume although I suspect that there has to be some component of edema given the BNP.  I will not aggressively diurese or volume resuscitate at this time given the blood pressures running 115-120 systolic.  [DC]      ED Course User Index  [DC] Babatunde Armstrong MD       Hospitalization Considered?: yes    Orders Placed During This Visit:  Orders Placed This Encounter   Procedures    Respiratory Panel PCR w/COVID-19(SARS-CoV-2) JEROMY/VIK/DONALDO/PAD/COR/DANA  In-House, NP Swab in UTM/VTM, 2 HR TAT - Swab, Nasopharynx    XR Chest 1 View    CT Angiogram Chest Pulmonary Embolism    Comprehensive Metabolic Panel    Lipase    BNP    D-dimer, Quantitative    High Sensitivity Troponin T    Procalcitonin    Lactic Acid, Plasma    Magnesium    CBC Auto Differential    STAT Lactic Acid, Reflex    High Sensitivity Troponin T 1Hr    [facility]HA (on-call MD unless specified) Details    ECG 12 Lead Tachycardia    Inpatient Admission    CBC & Differential       Additional orders considered but not placed:      Independent interpretation of labs, radiology studies, and discussions with consultants: See ED Course        AS OF 21:58 EST VITALS:    BP - 100/68  HR - (!) 128  TEMP - 97.7 °F (36.5 °C) (Tympanic)  02 SATS - 96%          DIAGNOSIS  Final diagnoses:   Pneumonia of both lungs due to Mycoplasma pneumoniae, unspecified part of lung   Other acute pulmonary embolism, unspecified whether acute cor pulmonale present   Hypoxia   Elevated brain natriuretic peptide (BNP) level         DISPOSITION  ED Disposition       ED Disposition   Decision to Admit    Condition   --    Comment   Level of Care: Telemetry [5]   Diagnosis: Pneumonia [371898]   Admitting Physician: BRANDON LOPEZ [[id number]]   Attending Physician: BRANDON LOPEZ [[id number]]   Certification: I Certify That Inpatient Hospital Services Are Medically Necessary For Greater Than 2 Midnights                  Please note that portions of this document were completed with a voice recognition program.    Note Disclaimer: At [facility], we believe that sharing information builds trust and better relationships. You are receiving this note because you recently visited [facility]. It is possible you will see health information before a provider has talked with you about it. This kind of information can be easy to misunderstand. To help you fully understand what it means for your health, we urge you to discuss this note with your  provider.                       Babatunde Armstrong MD  12/10/24 2159      Electronically signed by Babatunde Armstrong MD at 12/10/24 2159       Yesi Burgess, RN at 12/10/24 1702          Pt family was at doctor office and had low O2 reading.     Pt c/o cough since yesterday. Pt 85% on RA. Pt does not normally wear O2.     Electronically signed by Yesi Burgess, RN at 12/10/24 1703       Vital Signs (last 3 days)       Date/Time Temp Temp src Pulse Resp BP Patient Position SpO2    12/12/24 0723 97.6 (36.4) Oral 109 20 106/76 Lying 96    12/12/24 0554 -- -- 105 -- -- -- --    12/12/24 0500 -- -- 120 -- -- -- --    12/12/24 0434 -- -- 110 -- -- -- --    12/12/24 0427 -- -- 120 -- 118/76 Lying 92    12/12/24 0308 -- -- 129 -- -- -- --    12/12/24 0300 -- -- 107 -- -- -- --    12/12/24 0234 -- -- -- -- -- -- 96    12/12/24 0214 -- -- 105 -- -- -- --    12/12/24 0100 -- -- 112 -- -- -- --    12/11/24 2355 97.5 (36.4) Oral 121 20 96/68 Lying 95    12/11/24 2316 -- -- 109 -- -- -- --    12/11/24 2300 -- -- 123 -- -- -- --    12/11/24 2224 -- -- 119 -- -- -- --    12/11/24 2100 -- -- 123 -- -- -- --    12/11/24 2005 -- -- -- -- -- -- 92    12/11/24 2002 -- -- -- -- -- -- 86    12/11/24 1954 97.7 (36.5) Oral 130 18 108/69 Lying 90    12/11/24 1329 97.9 (36.6) Oral 130 20 93/63 Lying 95    12/11/24 0842 -- -- 131 -- 120/79 -- --    12/11/24 0617 -- -- 120 -- -- -- 97    12/11/24 0614 -- -- 119 -- -- -- 97    12/11/24 0545 -- -- 143 -- 120/79 -- --    12/11/24 0418 -- -- 143 -- 150/92 -- 95    12/11/24 0401 -- -- 139 -- -- -- 100    12/11/24 0335 98.1 (36.7) Oral 138 -- -- -- 97    12/11/24 0329 -- -- 138 -- -- -- --    12/11/24 0010 -- -- 128 -- -- -- 93    12/11/24 0007 -- -- 129 -- -- -- --    12/10/24 2316 98.1 (36.7) Oral 132 22 117/73 Lying 93    12/10/24 2231 -- -- 152 -- 120/81 -- 92    12/10/24 2201 -- -- 124 -- 108/70 -- 95    12/10/24 2152 -- -- 128 -- -- -- 96    12/10/24 2151 -- -- 123 -- -- -- 95    12/10/24 2131  -- -- 121 -- 100/68 -- 95    12/10/24 2127 -- -- 124 -- -- -- 94    12/10/24 2126 -- -- 124 -- -- -- 95    12/10/24 2110 -- -- 134 -- -- -- 93    12/10/24 2102 -- -- 127 -- -- -- 95    12/10/24 2101 -- -- -- -- 107/77 -- --    12/10/24 2031 -- -- 129 -- 115/73 -- 100    12/10/24 2029 -- -- 134 -- -- -- 100    12/10/24 2001 -- -- 127 -- 123/71 -- --    12/10/24 1901 -- -- 124 -- 119/64 -- 97    12/10/24 1855 -- -- 123 -- -- -- 98    12/10/24 1847 -- -- 126 -- -- -- 96    12/10/24 1831 -- -- -- -- 104/66 -- --    12/10/24 1828 -- -- 126 -- -- -- 96    12/10/24 1827 -- -- 129 -- -- -- 96    12/10/24 1814 -- -- 130 -- -- -- 93    12/10/24 1812 -- -- 131 -- -- -- 94    12/10/24 1806 -- -- -- -- -- -- 94    12/10/24 1805 -- -- 133 -- -- -- --    12/10/24 1801 -- -- -- -- 114/79 -- --    12/10/24 1744 -- -- 127 -- -- -- 93    12/10/24 1741 -- -- 132 -- -- -- 94    12/10/24 1740 -- -- 131 -- -- -- 95    12/10/24 1732 -- -- 132 -- -- -- 96    12/10/24 1731 -- -- 135 -- 111/70 -- 95    12/10/24 1725 -- -- 130 -- -- -- 94    12/10/24 1723 -- -- 145 -- -- -- 95    12/10/24 1722 -- -- 145 -- -- -- 96    12/10/24 1720 -- -- 145 -- -- -- 94    12/10/24 1719 -- -- 146 -- -- -- 94    12/10/24 1718 -- -- 147 -- -- -- 95    12/10/24 1716 -- -- 145 -- -- -- 94    12/10/24 1715 -- -- 148 -- -- -- --    12/10/24 1710 -- -- 140 -- -- -- 91    12/10/24 1708 -- -- 113 22 114/75 -- --    12/10/24 1703 97.7 (36.5) Tympanic 146 -- -- -- 85          Oxygen Therapy (last 3 days)       Date/Time SpO2 Device (Oxygen Therapy) Flow (L/min) (Oxygen Therapy) Oxygen Concentration (%) ETCO2 (mmHg)    12/12/24 0723 96 nasal cannula -- -- --    12/12/24 0427 92 -- -- -- --    12/12/24 0234 96 nasal cannula 1 -- --    12/11/24 2355 95 nasal cannula 2 -- --    12/11/24 2005 92 nasal cannula 2 -- --    12/11/24 2002 86 nasal cannula 1 -- --    12/11/24 1954 90 nasal cannula 1 -- --    12/11/24 1335 -- nasal cannula 1 -- --    12/11/24 1329 95 -- -- -- --     12/11/24 1005 -- nasal cannula 1 -- --    12/11/24 0617 97 -- -- -- --    12/11/24 0614 97 -- -- -- --    12/11/24 0418 95 nasal cannula 1 -- --    12/11/24 0401 100 -- -- -- --    12/11/24 0335 97 -- -- -- --    12/11/24 0010 93 -- -- -- --    12/10/24 2316 93 nasal cannula 2 -- --    12/10/24 2307 -- nasal cannula 2 -- --    12/10/24 2231 92 -- -- -- --    12/10/24 2201 95 -- -- -- --    12/10/24 2152 96 -- -- -- --    12/10/24 2151 95 -- -- -- --    12/10/24 2131 95 -- -- -- --    12/10/24 2127 94 -- -- -- --    12/10/24 2126 95 -- -- -- --    12/10/24 2110 93 -- -- -- --    12/10/24 2102 95 -- -- -- --    12/10/24 2031 100 -- -- -- --    12/10/24 2029 100 -- -- -- --    12/10/24 1901 97 -- -- -- --    12/10/24 1855 98 -- -- -- --    12/10/24 1847 96 -- -- -- --    12/10/24 1828 96 -- -- -- --    12/10/24 1827 96 -- -- -- --    12/10/24 1814 93 -- -- -- --    12/10/24 1812 94 -- -- -- --    12/10/24 1806 94 -- -- -- --    12/10/24 1744 93 -- -- -- --    12/10/24 1741 94 -- -- -- --    12/10/24 1740 95 -- -- -- --    12/10/24 1732 96 -- -- -- --    12/10/24 1731 95 -- -- -- --    12/10/24 1725 94 -- -- -- --    12/10/24 1723 95 -- -- -- --    12/10/24 1722 96 -- -- -- --    12/10/24 1720 94 -- -- -- --    12/10/24 1719 94 -- -- -- --    12/10/24 1718 95 -- -- -- --    12/10/24 1716 94 -- -- -- --    12/10/24 1712 -- nasal cannula 2 -- --    12/10/24 1710 91 -- -- -- --    12/10/24 1703 85 room air -- -- --          Intake & Output (last 3 days)         12/09 0701  12/10 0700 12/10 0701  12/11 0700 12/11 0701  12/12 0700 12/12 0701 12/13 0700    P.O.   240     Total Intake(mL/kg)   240 (6.4)     Urine (mL/kg/hr)  600 600 (0.7)     Total Output  600 600     Net  -600 -360                    Lines, Drains & Airways       Active LDAs       Name Placement date Placement time Site Days    Peripheral IV 12/10/24 1710 Right Antecubital 12/10/24  1710  Antecubital  1    External Urinary Catheter 12/11/24  0628  --  1              Current Facility-Administered Medications   Medication Dose Route Frequency Provider Last Rate Last Admin    acetaminophen (TYLENOL) tablet 650 mg  650 mg Oral Q4H PRN Adali León APRN        azithromycin (ZITHROMAX) tablet 500 mg  500 mg Oral Q24H Adali León APRN   500 mg at 12/11/24 1834    sennosides-docusate (PERICOLACE) 8.6-50 MG per tablet 2 tablet  2 tablet Oral BID PRN Adali León APRN        And    polyethylene glycol (MIRALAX) packet 17 g  17 g Oral Daily PRN Adali León APRN        And    bisacodyl (DULCOLAX) EC tablet 5 mg  5 mg Oral Daily PRN Adali León APRN        And    bisacodyl (DULCOLAX) suppository 10 mg  10 mg Rectal Daily PRN Adali León APRN        cefTRIAXone (ROCEPHIN) 1,000 mg in sodium chloride 0.9 % 100 mL MBP  1,000 mg Intravenous Q24H González Esparza MD        Enoxaparin Sodium (LOVENOX) syringe 40 mg  1 mg/kg Subcutaneous Q12H Adali León APRN   40 mg at 12/11/24 2133    ipratropium-albuterol (DUO-NEB) nebulizer solution 3 mL  3 mL Nebulization Q4H PRN Adali León APRN        metoprolol succinate XL (TOPROL-XL) 24 hr tablet 12.5 mg  12.5 mg Oral Q24H Opal Farrell MD   12.5 mg at 12/11/24 1421    nitroglycerin (NITROSTAT) SL tablet 0.4 mg  0.4 mg Sublingual Q5 Min PRN Adali León APRN        ondansetron ODT (ZOFRAN-ODT) disintegrating tablet 4 mg  4 mg Oral Q6H PRN Adali León APRN        Or    ondansetron (ZOFRAN) injection 4 mg  4 mg Intravenous Q6H PRN Adali León APRN        pantoprazole (PROTONIX) EC tablet 40 mg  40 mg Oral Daily González Esparza MD   40 mg at 12/11/24 1001    Pharmacy to Dose enoxaparin (LOVENOX)   Not Applicable Continuous PRN Adali León, AARON        QUEtiapine (SEROquel) tablet 200 mg  200 mg Oral BID González Esparza MD   200 mg at 12/11/24 5898     Lab Results (last 72 hours)       Procedure Component Value Units Date/Time    Blood Culture -  Blood, Arm, Left [[id number]]  (Normal) Collected: 12/11/24 0646    Specimen: Blood from Arm, Left Updated: 12/12/24 0745     Blood Culture No growth at 24 hours    Blood Culture - Blood, Hand, Right [[id number]]  (Normal) Collected: 12/11/24 0657    Specimen: Blood from Hand, Right Updated: 12/12/24 0745     Blood Culture No growth at 24 hours    Comprehensive Metabolic Panel [[id number]]  (Abnormal) Collected: 12/12/24 0350    Specimen: Blood Updated: 12/12/24 0541     Glucose 109 mg/dL      BUN 18 mg/dL      Creatinine 0.76 mg/dL      Sodium 141 mmol/L      Potassium 3.1 mmol/L      Chloride 103 mmol/L      CO2 26.0 mmol/L      Calcium 7.6 mg/dL      Total Protein 6.2 g/dL      Albumin 2.4 g/dL      ALT (SGPT) 30 U/L      AST (SGOT) 48 U/L      Alkaline Phosphatase 147 U/L      Total Bilirubin 0.4 mg/dL      Globulin 3.8 gm/dL      A/G Ratio 0.6 g/dL      BUN/Creatinine Ratio 23.7     Anion Gap 12.0 mmol/L      eGFR 81.8 mL/min/1.73     Narrative:      GFR Categories in Chronic Kidney Disease (CKD)      GFR Category          GFR (mL/min/1.73)    Interpretation  G1                     90 or greater         Normal or high (1)  G2                      60-89                Mild decrease (1)  G3a                   45-59                Mild to moderate decrease  G3b                   30-44                Moderate to severe decrease  G4                    15-29                Severe decrease  G5                    14 or less           Kidney failure          (1)In the absence of evidence of kidney disease, neither GFR category G1 or G2 fulfill the criteria for CKD.    eGFR calculation 2021 CKD-EPI creatinine equation, which does not include race as a factor    CBC (No Diff) [[id number]]  (Abnormal) Collected: 12/12/24 0350    Specimen: Blood Updated: 12/12/24 0429     WBC 14.04 10*3/mm3      RBC 3.25 10*6/mm3      Hemoglobin 9.2 g/dL      Hematocrit 26.6 %      MCV 81.8 fL      MCH 28.3 pg      MCHC 34.6 g/dL      RDW 13.8  %      RDW-SD 40.8 fl      MPV 10.0 fL      Platelets 452 10*3/mm3     Morgan Draw [[id number]] Collected: 12/11/24 0646    Specimen: Blood Updated: 12/11/24 0816    Narrative:      The following orders were created for panel order Morgan Draw.  Procedure                               Abnormality         Status                     ---------                               -----------         ------                     Lavender Top[[id number]]                                     Final result                 Please view results for these tests on the individual orders.    Lavender Top [[id number]] Collected: 12/11/24 0646    Specimen: Blood Updated: 12/11/24 0816     Extra Tube hold for add-on     Comment: Auto resulted       High Sensitivity Troponin T [[id number]]  (Abnormal) Collected: 12/11/24 0646    Specimen: Blood Updated: 12/11/24 0801     HS Troponin T 52 ng/L     Narrative:      High Sensitive Troponin T Reference Range:  <14.0 ng/L- Negative Female for AMI  <22.0 ng/L- Negative Male for AMI  >=14 - Abnormal Female indicating possible myocardial injury.  >=22 - Abnormal Male indicating possible myocardial injury.   Clinicians would have to utilize clinical acumen, EKG, Troponin, and serial changes to determine if it is an Acute Myocardial Infarction or myocardial injury due to an underlying chronic condition.         Basic Metabolic Panel [[id number]]  (Abnormal) Collected: 12/11/24 0311    Specimen: Blood Updated: 12/11/24 0343     Glucose 109 mg/dL      BUN 20 mg/dL      Creatinine 0.79 mg/dL      Sodium 140 mmol/L      Potassium 3.5 mmol/L      Chloride 103 mmol/L      CO2 22.9 mmol/L      Calcium 7.7 mg/dL      BUN/Creatinine Ratio 25.3     Anion Gap 14.1 mmol/L      eGFR 78.1 mL/min/1.73     Narrative:      GFR Categories in Chronic Kidney Disease (CKD)      GFR Category          GFR (mL/min/1.73)    Interpretation  G1                     90 or greater         Normal or high (1)  G2                       60-89                Mild decrease (1)  G3a                   45-59                Mild to moderate decrease  G3b                   30-44                Moderate to severe decrease  G4                    15-29                Severe decrease  G5                    14 or less           Kidney failure          (1)In the absence of evidence of kidney disease, neither GFR category G1 or G2 fulfill the criteria for CKD.    eGFR calculation 2021 CKD-EPI creatinine equation, which does not include race as a factor    CBC (No Diff) [[id number]]  (Abnormal) Collected: 12/11/24 0311    Specimen: Blood Updated: 12/11/24 0328     WBC 20.46 10*3/mm3      RBC 3.30 10*6/mm3      Hemoglobin 9.5 g/dL      Hematocrit 27.7 %      MCV 83.9 fL      MCH 28.8 pg      MCHC 34.3 g/dL      RDW 14.3 %      RDW-SD 42.9 fl      MPV 9.9 fL      Platelets 601 10*3/mm3     High Sensitivity Troponin T 1Hr [[id number]]  (Abnormal) Collected: 12/10/24 1817    Specimen: Blood Updated: 12/11/24 0002     HS Troponin T 49 ng/L      Troponin T Delta 1 ng/L      Troponin T % Change 2 %     Narrative:      High Sensitive Troponin T Reference Range:  <14.0 ng/L- Negative Female for AMI  <22.0 ng/L- Negative Male for AMI  >=14 - Abnormal Female indicating possible myocardial injury.  >=22 - Abnormal Male indicating possible myocardial injury.   Clinicians would have to utilize clinical acumen, EKG, Troponin, and serial changes to determine if it is an Acute Myocardial Infarction or myocardial injury due to an underlying chronic condition.         STAT Lactic Acid, Reflex [[id number]]  (Normal) Collected: 12/10/24 2019    Specimen: Blood Updated: 12/10/24 2050     Lactate 1.3 mmol/L     Respiratory Panel PCR w/COVID-19(SARS-CoV-2) JEROMY/VIK/DONALDO/PAD/COR/DANA In-House, NP Swab in UTM/VTM, 2 HR TAT - Swab, Nasopharynx [[id number]]  (Abnormal) Collected: 12/10/24 1716    Specimen: Swab from Nasopharynx Updated: 12/10/24 1832     ADENOVIRUS, PCR Not Detected      Coronavirus 229E Not Detected     Coronavirus HKU1 Not Detected     Coronavirus NL63 Not Detected     Coronavirus OC43 Not Detected     COVID19 Not Detected     Human Metapneumovirus Not Detected     Human Rhinovirus/Enterovirus Not Detected     Influenza A PCR Not Detected     Influenza B PCR Not Detected     Parainfluenza Virus 1 Not Detected     Parainfluenza Virus 2 Not Detected     Parainfluenza Virus 3 Not Detected     Parainfluenza Virus 4 Not Detected     RSV, PCR Not Detected     Bordetella pertussis pcr Not Detected     Bordetella parapertussis PCR Not Detected     Chlamydophila pneumoniae PCR Not Detected     Mycoplasma pneumo by PCR Detected    Narrative:      In the setting of a positive respiratory panel with a viral infection PLUS a negative procalcitonin without other underlying concern for bacterial infection, consider observing off antibiotics or discontinuation of antibiotics and continue supportive care. If the respiratory panel is positive for atypical bacterial infection (Bordetella pertussis, Chlamydophila pneumoniae, or Mycoplasma pneumoniae), consider antibiotic de-escalation to target atypical bacterial infection.    High Sensitivity Troponin T [[id number]]  (Abnormal) Collected: 12/10/24 1714    Specimen: Blood Updated: 12/10/24 1807     HS Troponin T 48 ng/L     Narrative:      High Sensitive Troponin T Reference Range:  <14.0 ng/L- Negative Female for AMI  <22.0 ng/L- Negative Male for AMI  >=14 - Abnormal Female indicating possible myocardial injury.  >=22 - Abnormal Male indicating possible myocardial injury.   Clinicians would have to utilize clinical acumen, EKG, Troponin, and serial changes to determine if it is an Acute Myocardial Infarction or myocardial injury due to an underlying chronic condition.         BNP [[id number]]  (Abnormal) Collected: 12/10/24 1714    Specimen: Blood Updated: 12/10/24 1807     proBNP 10,957.0 pg/mL     Narrative:      This assay is used as an aid in the  "diagnosis of individuals suspected of having heart failure. It can be used as an aid in the diagnosis of acute decompensated heart failure (ADHF) in patients presenting with signs and symptoms of ADHF to the emergency department (ED). In addition, NT-proBNP of <300 pg/mL indicates ADHF is not likely.    Age Range Result Interpretation  NT-proBNP Concentration (pg/mL:      <50             Positive            >450                   Gray                 300-450                    Negative             <300    50-75           Positive            >900                  Gray                300-900                  Negative            <300      >75             Positive            >1800                  Gray                300-1800                  Negative            <300    Procalcitonin [[id number]]  (Abnormal) Collected: 12/10/24 1714    Specimen: Blood Updated: 12/10/24 1807     Procalcitonin 1.94 ng/mL     Narrative:      As a Marker for Sepsis (Non-Neonates):    1. <0.5 ng/mL represents a low risk of severe sepsis and/or septic shock.  2. >2 ng/mL represents a high risk of severe sepsis and/or septic shock.    As a Marker for Lower Respiratory Tract Infections that require antibiotic therapy:    PCT on Admission    Antibiotic Therapy       6-12 Hrs later    >0.5                Strongly Recommended  >0.25 - <0.5        Recommended   0.1 - 0.25          Discouraged              Remeasure/reassess PCT  <0.1                Strongly Discouraged     Remeasure/reassess PCT    As 28 day mortality risk marker: \"Change in Procalcitonin Result\" (>80% or <=80%) if Day 0 (or Day 1) and Day 4 values are available. Refer to [email or web address]    Change in PCT <=80%  A decrease of PCT levels below or equal to 80% defines a positive change in PCT test result representing a higher risk for 28-day all-cause mortality of patients diagnosed with severe sepsis for septic shock.    Change in PCT >80%  A decrease of PCT " levels of more than 80% defines a negative change in PCT result representing a lower risk for 28-day all-cause mortality of patients diagnosed with severe sepsis or septic shock.       Comprehensive Metabolic Panel [[id number]]  (Abnormal) Collected: 12/10/24 1714    Specimen: Blood Updated: 12/10/24 1801     Glucose 176 mg/dL      BUN 18 mg/dL      Creatinine 1.01 mg/dL      Sodium 135 mmol/L      Potassium 3.5 mmol/L      Chloride 99 mmol/L      CO2 22.8 mmol/L      Calcium 8.1 mg/dL      Total Protein 7.8 g/dL      Albumin 2.7 g/dL      ALT (SGPT) 39 U/L      AST (SGOT) 41 U/L      Alkaline Phosphatase 183 U/L      Total Bilirubin 0.6 mg/dL      Globulin 5.1 gm/dL      A/G Ratio 0.5 g/dL      BUN/Creatinine Ratio 17.8     Anion Gap 13.2 mmol/L      eGFR 58.2 mL/min/1.73     Narrative:      GFR Categories in Chronic Kidney Disease (CKD)      GFR Category          GFR (mL/min/1.73)    Interpretation  G1                     90 or greater         Normal or high (1)  G2                      60-89                Mild decrease (1)  G3a                   45-59                Mild to moderate decrease  G3b                   30-44                Moderate to severe decrease  G4                    15-29                Severe decrease  G5                    14 or less           Kidney failure          (1)In the absence of evidence of kidney disease, neither GFR category G1 or G2 fulfill the criteria for CKD.    eGFR calculation 2021 CKD-EPI creatinine equation, which does not include race as a factor    Lipase [[id number]]  (Normal) Collected: 12/10/24 1714    Specimen: Blood Updated: 12/10/24 1801     Lipase 15 U/L     Magnesium [[id number]]  (Normal) Collected: 12/10/24 1714    Specimen: Blood Updated: 12/10/24 1801     Magnesium 2.3 mg/dL     Lactic Acid, Plasma [[id number]]  (Abnormal) Collected: 12/10/24 1714    Specimen: Blood Updated: 12/10/24 1756     Lactate 2.8 mmol/L     D-dimer, Quantitative [[id number]]  (Abnormal)  "Collected: 12/10/24 1714    Specimen: Blood Updated: 12/10/24 1744     D-Dimer, Quantitative 2.44 MCGFEU/mL     Narrative:      According to the assay 's published package insert, a normal (<0.50 MCGFEU/mL) D-dimer result in conjunction with a non-high clinical probability assessment, excludes deep vein thrombosis (DVT) and pulmonary embolism (PE) with high sensitivity.    D-dimer values increase with age and this can make VTE exclusion of an older population difficult. To address this, the [employer], based on best available evidence and recent guidelines, recommends that clinicians use age-adjusted D-dimer thresholds in patients greater than 50 years of age with: a) a low probability of PE who do not meet all Pulmonary Embolism Rule Out Criteria, or b) in those with intermediate probability of PE.   The formula for an age-adjusted D-dimer cut-off is \"age/100\".  For example, a 60 year old patient would have an age-adjusted cut-off of 0.60 MCGFEU/mL and an 80 year old 0.80 MCGFEU/mL.    CBC & Differential [[id number]]  (Abnormal) Collected: 12/10/24 1714    Specimen: Blood Updated: 12/10/24 1731    Narrative:      The following orders were created for panel order CBC & Differential.  Procedure                               Abnormality         Status                     ---------                               -----------         ------                     CBC Auto Differential[[id number]]        Abnormal            Final result                 Please view results for these tests on the individual orders.    CBC Auto Differential [[id number]]  (Abnormal) Collected: 12/10/24 1714    Specimen: Blood Updated: 12/10/24 1731     WBC 18.58 10*3/mm3      RBC 3.55 10*6/mm3      Hemoglobin 10.3 g/dL      Hematocrit 29.8 %      MCV 83.9 fL      MCH 29.0 pg      MCHC 34.6 g/dL      RDW 13.8 %      RDW-SD 42.2 fl      MPV 9.7 fL      Platelets 602 10*3/mm3      Neutrophil % 90.9 %      Lymphocyte % " 3.0 %      Monocyte % 4.0 %      Eosinophil % 0.1 %      Basophil % 0.3 %      Immature Grans % 1.7 %      Neutrophils, Absolute 16.90 10*3/mm3      Lymphocytes, Absolute 0.56 10*3/mm3      Monocytes, Absolute 0.75 10*3/mm3      Eosinophils, Absolute 0.01 10*3/mm3      Basophils, Absolute 0.05 10*3/mm3      Immature Grans, Absolute 0.31 10*3/mm3      nRBC 0.0 /100 WBC           Imaging Results (Last 72 Hours)       Procedure Component Value Units Date/Time    CT Angiogram Chest Pulmonary Embolism [[id number]] Collected: 12/10/24 2011     Updated: 12/10/24 2030    Narrative:      CT ANGIOGRAM CHEST PULMONARY EMBOLISM-     INDICATIONS: Hypoxia     TECHNIQUE: Radiation dose reduction techniques were utilized, including  automated exposure control and exposure modulation based on body size.  CT angiography of the chest. Three-dimensional reconstructions.     COMPARISON: None available      FINDINGS:     Pulmonary embolism in segmental left upper lobe pulmonary artery,  coronal image 85. RV LV ratio is measured at 1. No aortic dissection.     The heart size is normal without pericardial effusion. Several  mediastinal and bilateral hilar lymph nodes are demonstrated, some of  which are mildly prominent, nonspecific, for example left paratracheal,  1.2 cm short axis, axial image 38, could be reactive in nature or  potentially evidence of neoplasm. Masslike density in the subcarinal  region, 2.7 cm x 3.1 cm on axial image 59 is difficult to distinguish  from the esophagus at this level, may represent pathologic adenopathy,  possibility of an esophageal mass is not excluded, endoscopy and PET/CT  correlation advised as indicated.     The airways appear clear. Bronchiectasis is present in both lungs.     Trace right pleural effusion.           The lungs show extensive bilateral infiltrates suggesting multifocal  pneumonia; possibly, edema could also contribute to this appearance.  Some areas of consolidation appear nodular or  masslike in configuration,  especially at the lung apices, possibility of underlying  lesions/malignancy not excluded, CT follow-up recommended, PET/CT  correlation can be obtained as indicated.     Upper abdominal structures show no acute findings. Mild hiatal hernia is  present..     Degenerative changes are seen in the spine. No acute fracture is  identified.       Impression:         1. Pulmonary embolism in segmental left upper lobe pulmonary artery,  coronal image 85. RV LV ratio is measured at 1. Extensive bilateral  pulmonary infiltrates suggesting pneumonia, possibility of underlying  lesion/neoplasm not excluded, clinical correlation and follow-up/further  evaluation advised as indicated.     2. Subcarinal masslike density, could be evidence of neoplasm, possibly  involving the esophagus. Bilateral hilar and mediastinal adenopathy.  Advise further evaluation, that could include endoscopy, PET/CT.        Discussed by telephone with Dr. Armstrong at 2024, 12/10/2024.     This report was finalized on 12/10/2024 8:27 PM by Dr. Bc Luna M.D on Workstation: [id number]       XR Chest 1 View [[id number]] Collected: 12/10/24 1805     Updated: 12/10/24 1809    Narrative:      XR CHEST 1 VW-     HISTORY: Female who is 74 years-old, hypoxia     TECHNIQUE: Frontal view of the chest     COMPARISON: None available     FINDINGS: The heart size is normal. Diffuse bilateral infiltrates may  represent edema an/or pneumonia, follow-up recommended. No large pleural  effusion. No pneumothorax. No acute osseous process.       Impression:      As described.     This report was finalized on 12/10/2024 6:06 PM by Dr. Bc Luna M.D on Workstation: [id number]             ECG/EMG Results (last 72 hours)       Procedure Component Value Units Date/Time    Telemetry Scan [[id number]] Resulted: 12/10/24     Updated: 12/11/24 0003    ECG 12 Lead Tachycardia [[id number]] Collected: 12/10/24 1713     Updated: 12/11/24 0800     QT  Interval 269 ms      QTC Interval 420 ms     Narrative:      HEART BELI=271  bpm  RR Ekekfuwd=329  ms  UT Ciexyavf=440  ms  P Horizontal Axis=  deg  P Front Axis=38  deg  QRSD Interval=63  ms  QT Faupiiba=363  ms  GIlR=511  ms  QRS Axis=74  deg  T Wave Axis=99  deg  - ABNORMAL ECG -  Probable sinus tachycardia  Nonspecific  T abnormalities, lateral leads  No Prior Tracing for Comparison  Electronically Signed By: Dewayne Carbajal ([facility]) 2024-12-11 08:00:13  Date and Time of Study:2024-12-10 17:13:46    ECG 12 Lead Tachycardia [[id number]] Collected: 12/11/24 0236     Updated: 12/11/24 0915     QT Interval 313 ms      QTC Interval 462 ms     Narrative:      HEART TNAL=534  bpm  RR Ttpehwfu=469  ms  UT Interval=95  ms  P Horizontal Axis=22  deg  P Front Axis=38  deg  QRSD Interval=74  ms  QT Rlvgqnan=908  ms  CIfL=088  ms  QRS Axis=88  deg  T Wave Axis=167  deg  - ABNORMAL ECG -  Sinus tachycardia- rate has slowed  Aberrant conduction of SV complex(es)  Abnormal R-wave progression, early transition  Non-Specific STT wave changes  Electronically Signed By: Dewayne Carbajal ([facility]) 2024-12-11 09:15:04  Date and Time of Study:2024-12-11 02:36:22    Adult Transthoracic Echo Complete W/ Cont if Necessary Per Protocol [[id number]] Resulted: 12/11/24 0924     Updated: 12/11/24 0924     EF(MOD-bp) 39.3 %      LVIDd 3.5 cm      LVIDs 2.8 cm      IVSd 0.51 cm      LVPWd 0.59 cm      FS 17.8 %      IVS/LVPW 0.87 cm      ESV(cubed) 23.0 ml      LV Sys Vol (BSA corrected) 30.7 cm2      EDV(cubed) 41.3 ml      LV [place] Vol (BSA corrected) 53.8 cm2      LV mass(C)d 45.4 grams      LVOT area 2.46 cm2      LVOT diam 1.77 cm      EDV(MOD-sp2) 67.0 ml      EDV(MOD-sp4) 70.0 ml      ESV(MOD-sp2) 43.0 ml      ESV(MOD-sp4) 40.0 ml      SV(MOD-sp2) 24.0 ml      SV(MOD-sp4) 30.0 ml      SVi(MOD-SP2) 18.4 ml/m2      SVi(MOD-SP4) 23.0 ml/m2      SVi (LVOT) 32.1 ml/m2      EF(MOD-sp2) 35.8 %      EF(MOD-sp4) 42.9 %      TR max samy 253.9 cm/sec      SV(LVOT)  41.8 ml      TAPSE (>1.6) 1.91 cm      RV S' 16.6 cm/sec      LV V1 max 94.8 cm/sec      LV V1 max PG 3.6 mmHg      LV V1 mean PG 1.77 mmHg      LV V1 VTI 17.0 cm      Ao pk samy 133.0 cm/sec      Ao max PG 7.1 mmHg      Ao mean PG 3.7 mmHg      Ao V2 VTI 24.0 cm      MARÍA(I,D) 1.74 cm2      TR max PG 25.8 mmHg      RV V1 max PG 2.5 mmHg      RV V1 max 79.7 cm/sec      RV V1 VTI 13.6 cm      PA V2 max 90.2 cm/sec      PA acc time 0.08 sec      Ao root diam 2.7 cm      ACS 1.48 cm      Sinus 2.7 cm      STJ 2.48 cm     Narrative:        Left ventricular ejection fraction appears to be 31 - 35%.    The following left ventricular wall segments are hypokinetic: mid   anterior, apical anterior, basal anterolateral, mid anterolateral, apical   lateral, basal inferolateral, mid inferolateral, apical inferior, mid   inferior, basal inferoseptal, mid inferoseptal, apex hypokinetic, basal   anterior, basal inferior and basal inferoseptal. The following left   ventricular wall segments are akinetic: apical septal and mid   anteroseptal.    Left ventricular diastolic function was indeterminate.    Estimated right ventricular systolic pressure from tricuspid   regurgitation is normal (<35 mmHg).    Right ventricle incompletely visualized but appears to have normal size   and function    Resting tachycardia present during the echocardiogram.      Telemetry Scan [[id number]] Resulted: 12/10/24     Updated: 12/11/24 1038    Telemetry Scan [[id number]] Resulted: 12/10/24     Updated: 12/11/24 1137    Telemetry Scan [[id number]] Resulted: 12/10/24     Updated: 12/11/24 1835    Telemetry Scan [[id number]] Resulted: 12/10/24     Updated: 12/11/24 2205    Telemetry Scan [[id number]] Resulted: 12/10/24     Updated: 12/12/24 0507    Telemetry Scan [[id number]] Resulted: 12/10/24     Updated: 12/12/24 0507     Physician Progress Notes (all)    No notes of this type exist for this encounter.          Consult Notes (all)        Guillermo Green  AARON at 12/11/24 1628        Consult Orders    1. Inpatient Thoracic Surgery Consult [[id number]] ordered by González Esparza MD              Attestation signed by Shailesh Umaña MD at 12/11/24 1700    I have reviewed this documentation and agree.                      Inpatient Thoracic Surgery Consult  Consult performed by: Guillermo Green APRN  Consult ordered by: González Esparza MD          Patient Care Team:  Provider, No Known as PCP - General    Chief Complaint   Patient presents with    Cough       Subjective     History of Present Illness  Alanna Machuca is a 74-year-old female with a past medical history significant for schizophrenia who presented to [facility] on [facility]/10/[facility]24 with complaints of acute cough and shortness of breath since a day prior.  She was noted to be hypoxic at her doctor's office and required the application of supplemental oxygen.  Workup remarkable for PE, mycoplasma pneumoniae, heart failure.  CTA of the chest also demonstrated a subcarinal masslike density measuring 2.7 x 3.1 cm.  We have been consulted given this finding. Patient was agitated on exam, frustrated with her hospitalization.  She denied any shortness of breath and is on 1 L via nasal cannula.  She denied any significant pain.  She has a productive cough.  No fevers, chills, night sweats.  She denies any significant esophageal dysphagia or unintended weight loss.    Review of Systems   Constitutional:  Negative for activity change, chills, fatigue and fever.   HENT:  Negative for congestion and sore throat.    Respiratory:  Positive for cough. Negative for choking and shortness of breath (Improved).    Cardiovascular:  Negative for chest pain.   Gastrointestinal:  Negative for abdominal distention, nausea and vomiting.   Genitourinary:  Negative for dysuria.   Musculoskeletal:  Negative for back pain.   Skin:  Negative for color change and pallor.   Neurological:  Negative for dizziness, tremors and  syncope.   Psychiatric/Behavioral:  Negative for confusion.         History reviewed. No pertinent past medical history.  History reviewed. No pertinent surgical history.  History reviewed. No pertinent family history.  Social History     Socioeconomic History    Marital status: Single   Tobacco Use    Smoking status: Unknown   Vaping Use    Vaping status: Never Used   Substance and Sexual Activity    Alcohol use: Defer    Drug use: Defer    Sexual activity: Defer     Medications Prior to Admission   Medication Sig Dispense Refill Last Dose/Taking    alendronate (FOSAMAX) 70 MG tablet Take 1 tablet by mouth Every 7 (Seven) Days.   Taking    levocetirizine (XYZAL) 5 MG tablet Take 1 tablet by mouth Every Evening.   Taking    pantoprazole (PROTONIX) 40 MG EC tablet Take 1 tablet by mouth Daily.   Taking    QUEtiapine (SEROquel) 200 MG tablet Take 1 tablet by mouth 2 (Two) Times a Day.   Taking     No Known Allergies    Objective      Vital Signs  Temp:  [97.7 °F (36.5 °C)-98.1 °F (36.7 °C)] 97.9 °F (36.6 °C)  Heart Rate:  [113-152] 130  Resp:  [20-22] 20  BP: ()/(63-92) 93/63    Intake & Output (last day)         12/10 0701  12/11 0700 12/11 0701  12/12 0700    Urine (mL/kg/hr) 600     Total Output 600     Net -600                   Physical Exam  Constitutional:       General: She is not in acute distress.     Appearance: Normal appearance. She is ill-appearing. She is not toxic-appearing.   HENT:      Head: Normocephalic.      Mouth/Throat:      Mouth: Mucous membranes are moist.      Pharynx: Oropharynx is clear.   Eyes:      Pupils: Pupils are equal, round, and reactive to light.   Cardiovascular:      Rate and Rhythm: Normal rate and regular rhythm.   Pulmonary:      Effort: Pulmonary effort is normal. No respiratory distress.      Breath sounds: Rhonchi present.   Abdominal:      General: Abdomen is flat. There is no distension.      Palpations: Abdomen is soft.   Musculoskeletal:         General: No  swelling or tenderness.   Skin:     General: Skin is warm and dry.      Capillary Refill: Capillary refill takes less than 2 seconds.   Neurological:      General: No focal deficit present.      Mental Status: She is alert.      Comments: Alert    Psychiatric:         Mood and Affect: Mood normal.         Results Review:    I reviewed the patient's new clinical results.  I reviewed the patient's new imaging results and agree with the interpretation.  Discussed with patient, nurse, will discuss with surgeon    Imaging Results (Last 24 Hours)       Procedure Component Value Units Date/Time    CT Angiogram Chest Pulmonary Embolism [[id number]] Collected: 12/10/24 2011     Updated: 12/10/24 2030    Narrative:      CT ANGIOGRAM CHEST PULMONARY EMBOLISM-     INDICATIONS: Hypoxia     TECHNIQUE: Radiation dose reduction techniques were utilized, including  automated exposure control and exposure modulation based on body size.  CT angiography of the chest. Three-dimensional reconstructions.     COMPARISON: None available      FINDINGS:     Pulmonary embolism in segmental left upper lobe pulmonary artery,  coronal image 85. RV LV ratio is measured at 1. No aortic dissection.     The heart size is normal without pericardial effusion. Several  mediastinal and bilateral hilar lymph nodes are demonstrated, some of  which are mildly prominent, nonspecific, for example left paratracheal,  1.2 cm short axis, axial image 38, could be reactive in nature or  potentially evidence of neoplasm. Masslike density in the subcarinal  region, 2.7 cm x 3.1 cm on axial image 59 is difficult to distinguish  from the esophagus at this level, may represent pathologic adenopathy,  possibility of an esophageal mass is not excluded, endoscopy and PET/CT  correlation advised as indicated.     The airways appear clear. Bronchiectasis is present in both lungs.     Trace right pleural effusion.           The lungs show extensive bilateral infiltrates  suggesting multifocal  pneumonia; possibly, edema could also contribute to this appearance.  Some areas of consolidation appear nodular or masslike in configuration,  especially at the lung apices, possibility of underlying  lesions/malignancy not excluded, CT follow-up recommended, PET/CT  correlation can be obtained as indicated.     Upper abdominal structures show no acute findings. Mild hiatal hernia is  present..     Degenerative changes are seen in the spine. No acute fracture is  identified.       Impression:         1. Pulmonary embolism in segmental left upper lobe pulmonary artery,  coronal image 85. RV LV ratio is measured at 1. Extensive bilateral  pulmonary infiltrates suggesting pneumonia, possibility of underlying  lesion/neoplasm not excluded, clinical correlation and follow-up/further  evaluation advised as indicated.     2. Subcarinal masslike density, could be evidence of neoplasm, possibly  involving the esophagus. Bilateral hilar and mediastinal adenopathy.  Advise further evaluation, that could include endoscopy, PET/CT.        Discussed by telephone with Dr. Armstrong at 2024, 12/10/2024.     This report was finalized on 12/10/2024 8:27 PM by Dr. Bc Luna M.D on Workstation: [id number]       XR Chest 1 View [[id number]] Collected: 12/10/24 1805     Updated: 12/10/24 1809    Narrative:      XR CHEST 1 VW-     HISTORY: Female who is 74 years-old, hypoxia     TECHNIQUE: Frontal view of the chest     COMPARISON: None available     FINDINGS: The heart size is normal. Diffuse bilateral infiltrates may  represent edema an/or pneumonia, follow-up recommended. No large pleural  effusion. No pneumothorax. No acute osseous process.       Impression:      As described.     This report was finalized on 12/10/2024 6:06 PM by Dr. Bc Luna M.D on Workstation: [id number]               Lab Results:  Lab Results (last 24 hours)       Procedure Component Value Units Date/Time    [place] Draw [[id number]]  Collected: 12/11/24 0646    Specimen: Blood Updated: 12/11/24 0816    Narrative:      The following orders were created for panel order [place] Draw.  Procedure                               Abnormality         Status                     ---------                               -----------         ------                     Lavender Top[[id number]]                                     Final result                 Please view results for these tests on the individual orders.    Lavender Top [[id number]] Collected: 12/11/24 0646    Specimen: Blood Updated: 12/11/24 0816     Extra Tube hold for add-on     Comment: Auto resulted       High Sensitivity Troponin T [[id number]]  (Abnormal) Collected: 12/11/24 0646    Specimen: Blood Updated: 12/11/24 0801     HS Troponin T 52 ng/L     Narrative:      High Sensitive Troponin T Reference Range:  <14.0 ng/L- Negative Female for AMI  <22.0 ng/L- Negative Male for AMI  >=14 - Abnormal Female indicating possible myocardial injury.  >=22 - Abnormal Male indicating possible myocardial injury.   Clinicians would have to utilize clinical acumen, EKG, Troponin, and serial changes to determine if it is an Acute Myocardial Infarction or myocardial injury due to an underlying chronic condition.         Blood Culture - Blood, Hand, Right [[id number]] Collected: 12/11/24 0657    Specimen: Blood from Hand, Right Updated: 12/11/24 0737    Blood Culture - Blood, Arm, Left [[id number]] Collected: 12/11/24 0646    Specimen: Blood from Arm, Left Updated: 12/11/24 0736    Basic Metabolic Panel [[id number]]  (Abnormal) Collected: 12/11/24 0311    Specimen: Blood Updated: 12/11/24 0343     Glucose 109 mg/dL      BUN 20 mg/dL      Creatinine 0.79 mg/dL      Sodium 140 mmol/L      Potassium 3.5 mmol/L      Chloride 103 mmol/L      CO2 22.9 mmol/L      Calcium 7.7 mg/dL      BUN/Creatinine Ratio 25.3     Anion Gap 14.1 mmol/L      eGFR 78.1 mL/min/1.73     Narrative:      GFR Categories in Chronic Kidney  Disease (CKD)      GFR Category          GFR (mL/min/1.73)    Interpretation  G1                     90 or greater         Normal or high (1)  G2                      60-89                Mild decrease (1)  G3a                   45-59                Mild to moderate decrease  G3b                   30-44                Moderate to severe decrease  G4                    15-29                Severe decrease  G5                    14 or less           Kidney failure          (1)In the absence of evidence of kidney disease, neither GFR category G1 or G2 fulfill the criteria for CKD.    eGFR calculation 2021 CKD-EPI creatinine equation, which does not include race as a factor    CBC (No Diff) [[id number]]  (Abnormal) Collected: 12/11/24 0311    Specimen: Blood Updated: 12/11/24 0328     WBC 20.46 10*3/mm3      RBC 3.30 10*6/mm3      Hemoglobin 9.5 g/dL      Hematocrit 27.7 %      MCV 83.9 fL      MCH 28.8 pg      MCHC 34.3 g/dL      RDW 14.3 %      RDW-SD 42.9 fl      MPV 9.9 fL      Platelets 601 10*3/mm3     High Sensitivity Troponin T 1Hr [[id number]]  (Abnormal) Collected: 12/10/24 1817    Specimen: Blood Updated: 12/11/24 0002     HS Troponin T 49 ng/L      Troponin T Delta 1 ng/L      Troponin T % Change 2 %     Narrative:      High Sensitive Troponin T Reference Range:  <14.0 ng/L- Negative Female for AMI  <22.0 ng/L- Negative Male for AMI  >=14 - Abnormal Female indicating possible myocardial injury.  >=22 - Abnormal Male indicating possible myocardial injury.   Clinicians would have to utilize clinical acumen, EKG, Troponin, and serial changes to determine if it is an Acute Myocardial Infarction or myocardial injury due to an underlying chronic condition.         STAT Lactic Acid, Reflex [[id number]]  (Normal) Collected: 12/10/24 2019    Specimen: Blood Updated: 12/10/24 2050     Lactate 1.3 mmol/L     Respiratory Panel PCR w/COVID-19(SARS-CoV-2) JEROMY/VIK/DONALDO/PAD/COR/DANA In-House, NP Swab in UTM/VTM, 2 HR TAT - Swab,  Nasopharynx [[id number]]  (Abnormal) Collected: 12/10/24 1716    Specimen: Swab from Nasopharynx Updated: 12/10/24 1832     ADENOVIRUS, PCR Not Detected     Coronavirus 229E Not Detected     Coronavirus HKU1 Not Detected     Coronavirus NL63 Not Detected     Coronavirus OC43 Not Detected     COVID19 Not Detected     Human Metapneumovirus Not Detected     Human Rhinovirus/Enterovirus Not Detected     Influenza A PCR Not Detected     Influenza B PCR Not Detected     Parainfluenza Virus 1 Not Detected     Parainfluenza Virus 2 Not Detected     Parainfluenza Virus 3 Not Detected     Parainfluenza Virus 4 Not Detected     RSV, PCR Not Detected     Bordetella pertussis pcr Not Detected     Bordetella parapertussis PCR Not Detected     Chlamydophila pneumoniae PCR Not Detected     Mycoplasma pneumo by PCR Detected    Narrative:      In the setting of a positive respiratory panel with a viral infection PLUS a negative procalcitonin without other underlying concern for bacterial infection, consider observing off antibiotics or discontinuation of antibiotics and continue supportive care. If the respiratory panel is positive for atypical bacterial infection (Bordetella pertussis, Chlamydophila pneumoniae, or Mycoplasma pneumoniae), consider antibiotic de-escalation to target atypical bacterial infection.    High Sensitivity Troponin T [[id number]]  (Abnormal) Collected: 12/10/24 1714    Specimen: Blood Updated: 12/10/24 1807     HS Troponin T 48 ng/L     Narrative:      High Sensitive Troponin T Reference Range:  <14.0 ng/L- Negative Female for AMI  <22.0 ng/L- Negative Male for AMI  >=14 - Abnormal Female indicating possible myocardial injury.  >=22 - Abnormal Male indicating possible myocardial injury.   Clinicians would have to utilize clinical acumen, EKG, Troponin, and serial changes to determine if it is an Acute Myocardial Infarction or myocardial injury due to an underlying chronic condition.         BNP [[id number]]   "(Abnormal) Collected: 12/10/24 1714    Specimen: Blood Updated: 12/10/24 1807     proBNP 10,957.0 pg/mL     Narrative:      This assay is used as an aid in the diagnosis of individuals suspected of having heart failure. It can be used as an aid in the diagnosis of acute decompensated heart failure (ADHF) in patients presenting with signs and symptoms of ADHF to the emergency department (ED). In addition, NT-proBNP of <300 pg/mL indicates ADHF is not likely.    Age Range Result Interpretation  NT-proBNP Concentration (pg/mL:      <50             Positive            >450                   Gray                 300-450                    Negative             <300    50-75           Positive            >900                  Gray                300-900                  Negative            <300      >75             Positive            >1800                  Gray                300-1800                  Negative            <300    Procalcitonin [[id number]]  (Abnormal) Collected: 12/10/24 1714    Specimen: Blood Updated: 12/10/24 1807     Procalcitonin 1.94 ng/mL     Narrative:      As a Marker for Sepsis (Non-Neonates):    1. <0.5 ng/mL represents a low risk of severe sepsis and/or septic shock.  2. >2 ng/mL represents a high risk of severe sepsis and/or septic shock.    As a Marker for Lower Respiratory Tract Infections that require antibiotic therapy:    PCT on Admission    Antibiotic Therapy       6-12 Hrs later    >0.5                Strongly Recommended  >0.25 - <0.5        Recommended   0.1 - 0.25          Discouraged              Remeasure/reassess PCT  <0.1                Strongly Discouraged     Remeasure/reassess PCT    As 28 day mortality risk marker: \"Change in Procalcitonin Result\" (>80% or <=80%) if Day 0 (or Day 1) and Day 4 values are available. Refer to http://www.[employer]-pct-calculator.com    Change in PCT <=80%  A decrease of PCT levels below or equal to 80% defines a positive change in PCT test result " representing a higher risk for 28-day all-cause mortality of patients diagnosed with severe sepsis for septic shock.    Change in PCT >80%  A decrease of PCT levels of more than 80% defines a negative change in PCT result representing a lower risk for 28-day all-cause mortality of patients diagnosed with severe sepsis or septic shock.       Comprehensive Metabolic Panel [[id number]]  (Abnormal) Collected: 12/10/24 1714    Specimen: Blood Updated: 12/10/24 1801     Glucose 176 mg/dL      BUN 18 mg/dL      Creatinine 1.01 mg/dL      Sodium 135 mmol/L      Potassium 3.5 mmol/L      Chloride 99 mmol/L      CO2 22.8 mmol/L      Calcium 8.1 mg/dL      Total Protein 7.8 g/dL      Albumin 2.7 g/dL      ALT (SGPT) 39 U/L      AST (SGOT) 41 U/L      Alkaline Phosphatase 183 U/L      Total Bilirubin 0.6 mg/dL      Globulin 5.1 gm/dL      A/G Ratio 0.5 g/dL      BUN/Creatinine Ratio 17.8     Anion Gap 13.2 mmol/L      eGFR 58.2 mL/min/1.73     Narrative:      GFR Categories in Chronic Kidney Disease (CKD)      GFR Category          GFR (mL/min/1.73)    Interpretation  G1                     90 or greater         Normal or high (1)  G2                      60-89                Mild decrease (1)  G3a                   45-59                Mild to moderate decrease  G3b                   30-44                Moderate to severe decrease  G4                    15-29                Severe decrease  G5                    14 or less           Kidney failure          (1)In the absence of evidence of kidney disease, neither GFR category G1 or G2 fulfill the criteria for CKD.    eGFR calculation 2021 CKD-EPI creatinine equation, which does not include race as a factor    Lipase [[id number]]  (Normal) Collected: 12/10/24 1714    Specimen: Blood Updated: 12/10/24 1801     Lipase 15 U/L     Magnesium [[id number]]  (Normal) Collected: 12/10/24 1714    Specimen: Blood Updated: 12/10/24 1801     Magnesium 2.3 mg/dL     Lactic Acid, Plasma  "[[id number]]  (Abnormal) Collected: 12/10/24 1714    Specimen: Blood Updated: 12/10/24 1756     Lactate 2.8 mmol/L     D-dimer, Quantitative [[id number]]  (Abnormal) Collected: 12/10/24 1714    Specimen: Blood Updated: 12/10/24 1744     D-Dimer, Quantitative 2.44 MCGFEU/mL     Narrative:      According to the assay 's published package insert, a normal (<0.50 MCGFEU/mL) D-dimer result in conjunction with a non-high clinical probability assessment, excludes deep vein thrombosis (DVT) and pulmonary embolism (PE) with high sensitivity.    D-dimer values increase with age and this can make VTE exclusion of an older population difficult. To address this, the [employer], based on best available evidence and recent guidelines, recommends that clinicians use age-adjusted D-dimer thresholds in patients greater than 50 years of age with: a) a low probability of PE who do not meet all Pulmonary Embolism Rule Out Criteria, or b) in those with intermediate probability of PE.   The formula for an age-adjusted D-dimer cut-off is \"age/100\".  For example, a 60 year old patient would have an age-adjusted cut-off of 0.60 MCGFEU/mL and an 80 year old 0.80 MCGFEU/mL.    CBC & Differential [[id number]]  (Abnormal) Collected: 12/10/24 1714    Specimen: Blood Updated: 12/10/24 1731    Narrative:      The following orders were created for panel order CBC & Differential.  Procedure                               Abnormality         Status                     ---------                               -----------         ------                     CBC Auto Differential[[id number]]        Abnormal            Final result                 Please view results for these tests on the individual orders.    CBC Auto Differential [[id number]]  (Abnormal) Collected: 12/10/24 1714    Specimen: Blood Updated: 12/10/24 1731     WBC 18.58 10*3/mm3      RBC 3.55 10*6/mm3      Hemoglobin 10.3 g/dL      Hematocrit 29.8 %      MCV 83.9 " fL      MCH 29.0 pg      MCHC 34.6 g/dL      RDW 13.8 %      RDW-SD 42.2 fl      MPV 9.7 fL      Platelets 602 10*3/mm3      Neutrophil % 90.9 %      Lymphocyte % 3.0 %      Monocyte % 4.0 %      Eosinophil % 0.1 %      Basophil % 0.3 %      Immature Grans % 1.7 %      Neutrophils, Absolute 16.90 10*3/mm3      Lymphocytes, Absolute 0.56 10*3/mm3      Monocytes, Absolute 0.75 10*3/mm3      Eosinophils, Absolute 0.01 10*3/mm3      Basophils, Absolute 0.05 10*3/mm3      Immature Grans, Absolute 0.31 10*3/mm3      nRBC 0.0 /100 WBC                 Assessment & Plan       Mycoplasma pneumonia    Pulmonary embolism    Acute and chronic respiratory failure with hypoxia    LFTs abnormal    Sepsis due to pneumonia    Anemia, chronic disease    Schizophrenia      Assessment & Plan  CTA chest performed yesterday, 12/10/2024 reviewed which demonstrates PE in segmental left upper lobe pulmonary artery.  Extensive bilateral pulmonary infiltrates suggesting pneumonia.  Some areas of consolidation appear nodular and masslike in configuration especially at the lung apices.  There is a subcarinal masslike density measuring 2.7 cm x 3.1 cm.  Bilateral hilar mediastinal adenopathy.    Difficult to distinguish subcarinal masslike density.  Possible reactive adenopathy although unable to exclude malignant etiology.  She denies any esophageal dysphagia.  At minimum would require short interval CT imaging to assess for regression.  Consider outpatient PET to better characterize.  Will discuss with on-call surgeon, further recommendations to follow.    Patient receiving treatment for acute issues.  Patient now on antibiotics for mycoplasma pneumoniae per primary. Cardiology following for systolic cardiomyopathy of unknown etiology and PE.  Holding off on diuretics as she does not appear overloaded and she has been started on Lopressor.  She is on therapeutic Lovenox for PE.     I discussed the patient's findings and our recommendations with  patient, family, and nursing staff    Thank you for this consult and allowing us to participate in the care of your patient.  We will follow along with you during this hospitalization.     AARON Browne  [facility]  24  16:28 EST    I have spent greater than 75 minutes reviewing the patient's chart including medical history, notes, radiographic images, labs, and performing assessment and development of a plan and discussion with the patient/family at bedside.;      Electronically signed by Shailesh Umaña MD at 24 1700       Opal Farrell MD at 24 1310              Patient Name: Alanna Machuca  :1950  74 y.o.    Date of Admission: 12/10/2024  Date of Consultation:  24  Encounter Provider: Opal Farrell MD  Place of Service: [facility] CARDIOLOGY  Referring Provider: Carmel Sawant MD  Patient Care Team:  Provider, No Known as PCP - General      Chief complaint:  cardiomyopathy  History of Present Illness:  This is a pleasant 74-year-old woman who is admitted for acute hypoxic respiratory failure.  She is unable to provide a meaningful history.  She is resting flat and comfortably in bed on 1 L nasal cannula oxygen.  Apparently she was hypoxic yesterday at a physician's office in the 80s.  Her D-dimer was elevated she had a CT angiogram which showed diffuse pneumonia possible pulmonary edema no effusions small segmental PE in the left upper lobe.  Echocardiogram shows reduced systolic ejection fraction globally about 30 to 35%.  No significant wall motion abnormalities or valvular disease.  RVSP was normal.  EKG shows sinus tachycardia.  Troponins are minimally elevated in the 40-50 range.  Her hemoglobin is low at 9.5.  Leukocytosis is marked at 20. BNP is 10K  She was given fluids overnight.  She has been tachycardic in the 120s to 130s range.  Blood pressures are ranging between 101 50 systolic.    History reviewed. No  "pertinent past medical history.    History reviewed. No pertinent surgical history.      Prior to Admission medications    Medication Sig Start Date End Date Taking? Authorizing Provider   alendronate (FOSAMAX) 70 MG tablet Take 1 tablet by mouth Every 7 (Seven) Days.   Yes Ana Sahu MD   levocetirizine (XYZAL) 5 MG tablet Take 1 tablet by mouth Every Evening.   Yes Ana Sahu MD   pantoprazole (PROTONIX) 40 MG EC tablet Take 1 tablet by mouth Daily.   Yes Ana Sahu MD   QUEtiapine (SEROquel) 200 MG tablet Take 1 tablet by mouth 2 (Two) Times a Day.   Yes Ana Sahu MD       No Known Allergies    Social History     Socioeconomic History    Marital status: Single   Tobacco Use    Smoking status: Unknown   Vaping Use    Vaping status: Never Used   Substance and Sexual Activity    Alcohol use: Defer    Drug use: Defer    Sexual activity: Defer       History reviewed. No pertinent family history.    REVIEW OF SYSTEMS:   All systems reviewed.  Pertinent positives identified in HPI.  All other systems are negative.      Objective:     Vitals:    12/11/24 0614 12/11/24 0617 12/11/24 0628 12/11/24 0842   BP:    120/79   BP Location:       Patient Position:       Pulse: 119 120  (!) 131   Resp:       Temp:       TempSrc:       SpO2: 97% 97%     Weight:   36.9 kg (81 lb 5.6 oz) 36.7 kg (81 lb)   Height:    157.5 cm (62\")     Body mass index is 14.82 kg/m².    General Appearance:    Alert, cooperative, in no acute distress   Head:    Normocephalic, without obvious abnormality, atraumatic   Eyes:            Lids and lashes normal, conjunctivae and sclerae normal, no icterus, no pallor, corneas clear, PERRLA   Ears:    Ears appear intact with no abnormalities noted   Throat:   No oral lesions, no thrush, oral mucosa moist   Neck:   No adenopathy, supple, trachea midline, no thyromegaly, no carotid bruit, no JVD   Back:     No kyphosis present, no scoliosis present, no skin lesions, " erythema or scars, no tenderness to percussion or palpation, range of motion normal   Lungs:   Diffuse rales    Heart:    Regular rhythm and normal rate, normal S1 and S2, no murmur, no gallop, no rub, no click   Chest Wall:    No abnormalities observed   Abdomen:     Normal bowel sounds, no masses, no organomegaly, soft, nontender, nondistended, no guarding, no rebound  tenderness   Extremities:   Moves all extremities well, no edema, no cyanosis, no redness   Pulses:   Pulses palpable and equal bilaterally. Normal radial, carotid, femoral, dorsalis pedis and posterior tibial pulses bilaterally. Normal abdominal aorta   Skin:  Psychiatric:   No bleeding, bruising or rash    Alert and oriented x 3, normal mood and affect   Lab Review:     Results from last 7 days   Lab Units 12/11/24  0311 12/10/24  1714   SODIUM mmol/L 140 135*   POTASSIUM mmol/L 3.5 3.5   CHLORIDE mmol/L 103 99   CO2 mmol/L 22.9 22.8   BUN mg/dL 20 18   CREATININE mg/dL 0.79 1.01*   CALCIUM mg/dL 7.7* 8.1*   BILIRUBIN mg/dL  --  0.6   ALK PHOS U/L  --  183*   ALT (SGPT) U/L  --  39*   AST (SGOT) U/L  --  41*   GLUCOSE mg/dL 109* 176*     Results from last 7 days   Lab Units 12/11/24  0646 12/10/24  1817 12/10/24  1714   HSTROP T ng/L 52* 49* 48*     Results from last 7 days   Lab Units 12/11/24  0311   WBC 10*3/mm3 20.46*   HEMOGLOBIN g/dL 9.5*   HEMATOCRIT % 27.7*   PLATELETS 10*3/mm3 601*         Results from last 7 days   Lab Units 12/10/24  1714   MAGNESIUM mg/dL 2.3                   I personally viewed and interpreted the patient's EKG/Telemetry data.        Assessment and Plan:       Pneumo sepsis 2/2 mycoplasma  Systolic cardiomyopathy unknown etiology. Appears dry on exam, JVD is flat, no edema, oral mucosa is dry. Rales likely related to pneumonia. Will start low dose toprol given ongoing sinus tachycardia.   Elevated troponin related to demand in the setting of acute hypoxic respiratory failure  Possible subcarinal neoplastm with b/l  hilar adenopathy  Schizophrenia/dementia/AMS  I would not continue IV Diuretics at this time. She looks dry to me. Can re-consider tomorrow.   Tachycardia driven by sepsis, possibly cardiomyopathy?   Opal Farrell MD  12/11/24  13:10 EST                    Electronically signed by Opal Farrell MD at 12/11/24 9609

## 2024-12-12 NOTE — PROGRESS NOTES
"Nutrition Services    Patient Name:  Alanna Machuca  YOB: 1950  MRN: [id number]  Admit Date:  12/10/2024    Assessment Date:  12/12/24    Summary: Initial Visit for BMI <19  Pt is a 73 y/o female who presented with cough, SOB. Pt has a hx of CHF, Schizophrenia.  Pt laying in bed when I visited. Pt was alert but confused and not able to provide me a hx. RN reported pt ate a little bit of applesauce with meds but not much else today. Pt was asking me for french fries when I left. RD unsure of weight hx due to limited recent weights in EMR.  Pt denied any current n/v. RD performed NFPE which revealed significant muscle wasting and fat loss. RD offered supplements and pt was agreeable.     Patient meets ASPEN/AND criteria for nutrition diagnosis of severe malnutrition of chronic illness based on: muscle wasting and fat loss.     NFPE results below    RECS:  Boost TID    CLINICAL NUTRITION ASSESSMENT      Reason for Assessment BMI     Diagnosis/Problem   cough, SOB. Pt has a hx of CHF, Schizophrenia.   Medical/Surgical History History reviewed. No pertinent past medical history.    History reviewed. No pertinent surgical history.     Anthropometrics        Current Height  Current Weight  BMI kg/m2 Height: 157.5 cm (62\")  Weight: 37.4 kg (82 lb 7.2 oz) (12/12/24 0554)  Body mass index is 15.08 kg/m².   Adjusted BMI (if applicable)    BMI Category Underweight (18.4 or below)   Ideal Body Weight (IBW) 118 lbs   Usual Body Weight (UBW) unknown   Weight Trend Unknown   Weight History Wt Readings from Last 30 Encounters:   12/12/24 0554 37.4 kg (82 lb 7.2 oz)   12/11/24 0842 36.7 kg (81 lb)   12/11/24 0628 36.9 kg (81 lb 5.6 oz)   12/10/24 2030 52.2 kg (115 lb)        Estimated/Assessed Needs        Energy Requirements    Weight for Calculation 37 kg   Method for Estimation  35-40 kcal/kg   EST Needs (kcal/day) 3697-0358       Protein Requirements    Weight for Calculation 37 kg   EST Protein Needs (g/kg) 1.5 " - 2.0 gm/kg   EST Daily Needs (g/day) 55-74       Fluid Requirements     Method for Estimation 1 mL/kcal    Estimated Needs (mL/day)        Fluid Deficit    Current Na Level (mEq/L)    Desired Na Level (mEq/L)    Estimated Fluid Deficit (L)       Labs       Pertinent Labs    Results from last 7 days   Lab Units 12/12/24  0350 12/11/24  0311 12/10/24  1714   SODIUM mmol/L 141 140 135*   POTASSIUM mmol/L 3.1* 3.5 3.5   CHLORIDE mmol/L 103 103 99   CO2 mmol/L 26.0 22.9 22.8   BUN mg/dL 18 20 18   CREATININE mg/dL 0.76 0.79 1.01*   CALCIUM mg/dL 7.6* 7.7* 8.1*   BILIRUBIN mg/dL 0.4  --  0.6   ALK PHOS U/L 147*  --  183*   ALT (SGPT) U/L 30  --  39*   AST (SGOT) U/L 48*  --  41*   GLUCOSE mg/dL 109* 109* 176*     Results from last 7 days   Lab Units 12/12/24  0350 12/11/24  0311 12/10/24  1714   MAGNESIUM mg/dL  --   --  2.3   HEMOGLOBIN g/dL 9.2*   < > 10.3*   HEMATOCRIT % 26.6*   < > 29.8*   WBC 10*3/mm3 14.04*   < > 18.58*   ALBUMIN g/dL 2.4*  --  2.7*    < > = values in this interval not displayed.     Results from last 7 days   Lab Units 12/12/24  0350 12/11/24  0311 12/10/24  1714   PLATELETS 10*3/mm3 452* 601* 602*     COVID19   Date Value Ref Range Status   12/10/2024 Not Detected Not Detected - Ref. Range Final     Lab Results   Component Value Date    HGBA1C 5.6 02/13/2024          Medications           Scheduled Medications cefTRIAXone, 1,000 mg, Intravenous, Q24H  enoxaparin, 1 mg/kg, Subcutaneous, Q12H  metoprolol succinate XL, 12.5 mg, Oral, Q24H  pantoprazole, 40 mg, Oral, Daily  QUEtiapine, 200 mg, Oral, BID       Infusions Pharmacy to Dose enoxaparin (LOVENOX),        PRN Medications   acetaminophen    senna-docusate sodium **AND** polyethylene glycol **AND** bisacodyl **AND** bisacodyl    ipratropium-albuterol    nitroglycerin    ondansetron ODT **OR** ondansetron    Pharmacy to Dose enoxaparin (LOVENOX)     Physical Findings          General Findings alert, confused, disoriented, frail, generalized  wasting, loss of muscle mass, loss of subcutaneous fat, room air   Oral/Mouth Cavity WDL   Edema  not assessed   Gastrointestinal WDL   Skin  skin intact   Tubes/Drains/Lines none   NFPE See Malnutrition Severity Assessment     Malnutrition Severity Assessment      Patient meets criteria for : Severe Malnutrition  Malnutrition Type (Last 8 Hours)       Malnutrition Severity Assessment       Row Name 12/12/24 1753       Malnutrition Severity Assessment    Malnutrition Type Chronic Disease - Related Malnutrition      Row Name 12/12/24 1753       Insufficient Energy Intake     Insufficient Energy Intake Findings --  Unknown      Row Name 12/12/24 1753       Unintentional Weight Loss     Unintentional Weight Loss Findings --  Unknown      Row Name 12/12/24 1753       Muscle Loss    Loss of Muscle Mass Findings Severe    [place] Region Severe - deep hollowing/scooping, lack of muscle to touch, facial bones well defined    Clavicle Bone Region Severe - protruding prominent bone    Acromion Bone Region Severe - squared shoulders, bones, and acromion process protrusion prominent    Scapular Bone Region Severe - prominent bones, depressions easily visible between ribs, scapula, spine, shoulders    Dorsal Hand Region Severe - prominent depression    Patellar Region Severe - prominent bone, square looking, very little muscle definition    Anterior Thigh Region Severe - line/depression along thigh, obviously thin    Posterior Calf Region Severe - thin with very little definition/firmness      [facility] Name 12/12/24 1753       Fat Loss    Subcutaneous Fat Loss Findings Severe    Orbital Region  Severe - pronounced hollowness/depression, dark circles, loose saggy skin    Upper Arm Region Severe - mostly skin, very little space between folds, fingers touch      [facility] Name 12/12/24 1753       Declining Functional Status    Declining Functional Status Findings Measurably Reduced      Row Name 12/12/24 1753       Criteria Met (Must meet  criteria for severity in at least 2 of these categories: M Wasting, Fat Loss, Fluid, Secondary Signs, Wt. Status, Intake)    Patient meets criteria for  Severe Malnutrition                     Current Nutrition Orders & Evaluation of Intake       Oral Nutrition     Food Allergies NKFA   Current PO Diet Diet: Cardiac; Healthy Heart (2-3 Na+); Fluid Consistency: Thin (IDDSI 0)   Supplement n/a   PO Evaluation     % PO Intake No recorded intake    Factors Affecting Intake: altered mental status     PES STATEMENT / NUTRITION DIAGNOSIS      Nutrition Dx Problem  Problem: Malnutrition (severe)  Etiology: Medical Diagnosis - cough, SOB. Pt has a hx of CHF, Schizophrenia.    Signs/Symptoms: NFPE Results   --  NUTRITION INTERVENTION / PLAN OF CARE      Intervention Goal(s) Maintain nutrition status, Establish goals of care, Meet estimated needs, Increase intake, Accepts oral nutrition supplement, Maintain weight, No significant weight loss, Appropriate weight gain, and PO intake goal %: 75%         RD Intervention/Action Encourage intake, Continue to monitor, Care plan reviewed, and Recommend/order: Boost TID         Prescription/Orders:       PO Diet       Supplements Boost TID      Enteral Nutrition       Parenteral Nutrition    New Prescription Ordered? Yes   --      Monitor/Evaluation Per protocol, PO intake, Supplement intake, Pertinent labs, Weight, Skin status, GI status, Symptoms, POC/GOC, Swallow function   Discharge Plan/Needs No discharge needs identified at this time   --    RD to follow per protocol.      Electronically signed by:  Ministerio Horvath RDN, LD  12/12/24 17:32 EST

## 2024-12-12 NOTE — PLAN OF CARE
Goal Outcome Evaluation:  Plan of Care Reviewed With: patient           Outcome Evaluation: AOx2, disoriented to situation and time. VSS. Weaned to 1L. Pt request to lay flat in the bed, becomes frustrated if staff attempts to turn patient or raise HOB. PO intake poor, only drank water with medications and refusing to eat. Purewick in place. Resting well overnight. AM labs pending.                        Problem: Adult Inpatient Plan of Care  Goal: Plan of Care Review  Flowsheets (Taken 12/12/2024 0514)  Outcome Evaluation: AOx2, disoriented to situation and time. VSS. Weaned to 1L. Pt request to lay flat in the bed, becomes frustrated if staff attempts to turn patient or raise HOB. PO intake poor, only drank water with medications and refusing to eat. Purewick in place. Resting well overnight. AM labs pending.  Plan of Care Reviewed With: patient  Goal: Absence of Hospital-Acquired Illness or Injury  Intervention: Identify and Manage Fall Risk  Recent Flowsheet Documentation  Taken 12/12/2024 0408 by Betsy Mccoy RN  Safety Promotion/Fall Prevention: safety round/check completed  Taken 12/12/2024 0234 by Betsy Mccoy RN  Safety Promotion/Fall Prevention: safety round/check completed  Taken 12/12/2024 0018 by Betsy Mccoy RN  Safety Promotion/Fall Prevention: safety round/check completed  Taken 12/11/2024 2222 by Betsy Mccoy RN  Safety Promotion/Fall Prevention: safety round/check completed  Taken 12/11/2024 2132 by Betsy Mccoy RN  Safety Promotion/Fall Prevention: safety round/check completed  Intervention: Prevent Skin Injury  Recent Flowsheet Documentation  Taken 12/12/2024 0408 by Betsy Mccoy RN  Body Position:   supine   patient/family refused  Taken 12/12/2024 0234 by Betsy Mccoy RN  Body Position:   supine   patient/family refused  Taken 12/12/2024 0018 by Betsy Mccoy RN  Body Position:   supine   patient/family refused  Taken 12/11/2024 2222 by Betsy Mccoy RN  Body Position:   weight  shifting   supine  Taken 12/11/2024 2132 by Betsy Mccoy RN  Body Position:   weight shifting   supine  Intervention: Prevent and Manage VTE (Venous Thromboembolism) Risk  Recent Flowsheet Documentation  Taken 12/12/2024 0018 by Betsy Mccoy RN  VTE Prevention/Management: (lovenox) --  Taken 12/11/2024 2132 by Betsy Mccoy RN  VTE Prevention/Management: (lovenox) --  Goal: Optimal Comfort and Wellbeing  Intervention: Provide Person-Centered Care  Recent Flowsheet Documentation  Taken 12/11/2024 2132 by Betsy Mccoy RN  Trust Relationship/Rapport:   care explained   choices provided     Problem: Sepsis/Septic Shock  Goal: Absence of Infection Signs and Symptoms  Intervention: Promote Recovery  Recent Flowsheet Documentation  Taken 12/11/2024 2132 by Betsy Mccoy RN  Activity Management: activity encouraged     Problem: Skin Injury Risk Increased  Goal: Skin Health and Integrity  Intervention: Optimize Skin Protection  Recent Flowsheet Documentation  Taken 12/12/2024 0408 by Betsy Mccoy RN  Head of Bed (HOB) Positioning: HOB flat  Taken 12/12/2024 0234 by Betsy Mccoy RN  Head of Bed (HOB) Positioning: HOB flat  Taken 12/12/2024 0018 by Betsy Mccoy RN  Head of Bed (HOB) Positioning: HOB lowered  Taken 12/11/2024 2222 by Betsy Mccoy RN  Head of Bed (HOB) Positioning:   HOB lowered   HOB flat  Taken 12/11/2024 2132 by Betsy Mccoy RN  Activity Management: activity encouraged  Head of Bed (HOB) Positioning: HOB flat     Problem: Fall Injury Risk  Goal: Absence of Fall and Fall-Related Injury  Intervention: Identify and Manage Contributors  Recent Flowsheet Documentation  Taken 12/11/2024 2132 by Betsy Mccoy RN  Medication Review/Management: medications reviewed  Intervention: Promote Injury-Free Environment  Recent Flowsheet Documentation  Taken 12/12/2024 0408 by Betsy Mccoy RN  Safety Promotion/Fall Prevention: safety round/check completed  Taken 12/12/2024 0234 by Betsy Mccoy RN  Safety  Promotion/Fall Prevention: safety round/check completed  Taken 12/12/2024 0018 by Betsy Mccoy, RN  Safety Promotion/Fall Prevention: safety round/check completed  Taken 12/11/2024 2222 by Betsy Mccoy, RN  Safety Promotion/Fall Prevention: safety round/check completed  Taken 12/11/2024 2132 by Betsy Mccoy, RN  Safety Promotion/Fall Prevention: safety round/check completed

## 2024-12-12 NOTE — CASE MANAGEMENT/SOCIAL WORK
Continued Stay Note  [facility]     Patient Name: Alanna Machuca  MRN: [id number]  Today's Date: 12/12/2024    Admit Date: 12/10/2024    Plan: Return home with Beverly/Caregiver.   Discharge Plan       Row Name 12/12/24 1431       Plan    Plan Return home with Beverly/Caregiver.    Patient/Family in Agreement with Plan yes    Plan Comments Pt's guardian/Urszula emailed CCP guardianship paperwork. CCP faxed pt's guardianship paperwork to HIM and paperwork is uploaded into [facility] now. Connro RN/CCP                   Discharge Codes    No documentation.                       Mari Nam, RN

## 2024-12-12 NOTE — THERAPY EVALUATION
Patient Name: Alanna Machuca  : 1950    MRN: [id number]                              Today's Date: 2024       Admit Date: 12/10/2024    Visit Dx:     ICD-10-CM ICD-9-CM   1. Pneumonia of both lungs due to Mycoplasma pneumoniae, unspecified part of lung  J15.7 483.0   2. Other acute pulmonary embolism, unspecified whether acute cor pulmonale present  I26.99 415.19   3. Hypoxia  R09.02 799.02   4. Elevated brain natriuretic peptide (BNP) level  R79.89 790.99     Patient Active Problem List   Diagnosis    CAP (community acquired pneumonia)    Pulmonary embolism    Mycoplasma pneumonia    Acute and chronic respiratory failure with hypoxia    LFTs abnormal    Sepsis due to pneumonia    Anemia, chronic disease    Schizophrenia     History reviewed. No pertinent past medical history.  History reviewed. No pertinent surgical history.   General Information       Row Name 24 1246          Physical Therapy Time and Intention    Document Type evaluation  -SV     Mode of Treatment individual therapy;physical therapy  -SV       Row Name 24 1246          General Information    Patient Profile Reviewed yes  -SV     Prior Level of Function --  unknown, pt has caregiver and denied use of AD  -SV     Existing Precautions/Restrictions fall;cardiac  -SV     Barriers to Rehab medically complex;previous functional deficit;cognitive status  -SV       Row Name 24 1246          Living Environment    People in Home other (see comments)  caregiver  -SV       Row Name 24 1246          Home Main Entrance    Number of Stairs, Main Entrance two  -SV       Row Name 24 1246          Stairs Within Home, Primary    Stairs, Within Home, Primary 2 story home with 2 BELLO  -SV       Row Name 24 1246          Cognition    Orientation Status (Cognition) oriented to;person  -SV       Row Name 24 1246          Safety Issues/Impairments Affecting Functional Mobility    Safety Issues Affecting Function  (Mobility) insight into deficits/self-awareness;judgment  -SV     Impairments Affecting Function (Mobility) balance;cognition;endurance/activity tolerance;strength;shortness of breath;range of motion (ROM)  -     Cognitive Impairments, Mobility Safety/Performance insight into deficits/self-awareness;judgment  -SV               User Key  (r) = Recorded By, (t) = Taken By, (c) = Cosigned By      Initials Name Provider Type    SV Jolanta Trinh, PT Physical Therapist                   Mobility       Row Name 12/12/24 1300          Bed Mobility    Comment, (Bed Mobility) NT today due to pt declined further activity, HR elevates with activity  -               User Key  (r) = Recorded By, (t) = Taken By, (c) = Cosigned By      Initials Name Provider Type    Jolanta Hamilton PT Physical Therapist                   Obj/Interventions       Row Name 12/12/24 1300          Range of Motion Comprehensive    General Range of Motion upper extremity range of motion deficits identified  -     Comment, General Range of Motion right shoulder elevate arom 25%, prom 75%, left shoulder elevate arom 50%, prom 75%. elbows and hands wfl for age arom, hip flex and abd arom wfl supine, able to slr with lag, knee ex/flex wfl for age, kevin ankle DF < neutral.  -       Row Name 12/12/24 1300          Strength Comprehensive (MMT)    Comment, General Manual Muscle Testing (MMT) Assessment KEVIN shoulder elevation, ankle Df 2-/5 kevin due to rom loss, all other obs 3-/5 or 3/5 as obs with rom. no MMT today due to cardiac issues.  -       Row Name 12/12/24 1300          Motor Skills    Therapeutic Exercise --  2 reps pf stretch by PT 20 h, slr x10 aarom kevin, shoulder elevation aarom right 5 reps kevin  -       Row Name 12/12/24 1300          Balance    Comment, Balance bal NT  -       Row Name 12/12/24 1300          Sensory Assessment (Somatosensory)    Sensory Assessment (Somatosensory) not tested;unable/difficult to assess  -                User Key  (r) = Recorded By, (t) = Taken By, (c) = Cosigned By      Initials Name Provider Type    SV Jolanta Trinh, PT Physical Therapist                   Goals/Plan       Row Name 12/12/24 1306          Bed Mobility Goal 1 (PT)    Activity/Assistive Device (Bed Mobility Goal 1, PT) sit to supine/supine to sit  -SV     Person Level/Cues Needed (Bed Mobility Goal 1, PT) standby assist  -SV     Time Frame (Bed Mobility Goal 1, PT) 10 days  -SV       Row Name 12/12/24 1306          Transfer Goal 1 (PT)    Activity/Assistive Device (Transfer Goal 1, PT) sit-to-stand/stand-to-sit  -SV     Person Level/Cues Needed (Transfer Goal 1, PT) standby assist  -SV     Time Frame (Transfer Goal 1, PT) 10 days  -SV     Strategies/Barriers (Transfers Goal 1, PT) least vs no AD  -SV       Row Name 12/12/24 1306          Gait Training Goal 1 (PT)    Activity/Assistive Device (Gait Training Goal 1, PT) gait (walking locomotion);walker, rolling  -SV     Person Level (Gait Training Goal 1, PT) standby assist  -SV     Distance (Gait Training Goal 1, PT) least vs no AD  -SV     Time Frame (Gait Training Goal 1, PT) 10 days  -SV       Row Name 12/12/24 1306          ROM Goal 1 (PT)    ROM Goal 1 (PT) jeannine ankle DF > neutral aarom  -SV     Time Frame (ROM Goal 1, PT) 10 days  -SV       Row Name 12/12/24 1306          Therapy Assessment/Plan (PT)    Planned Therapy Interventions (PT) balance training;bed mobility training;gait training;home exercise program;patient/family education;stretching;strengthening;stair training;ROM (range of motion);transfer training  -SV               User Key  (r) = Recorded By, (t) = Taken By, (c) = Cosigned By      Initials Name Provider Type    Jolanta Hamilton, PT Physical Therapist                   Clinical Impression       Row Name 12/12/24 1304          Pain    Pre/Posttreatment Pain Comment pt indicated pain at end rom right shoulder elevation and left hip flex end rom,   -SV       Row Name 12/12/24 1304          Therapy Assessment/Plan (PT)    Patient/Family Therapy Goals Statement (PT) none stated today  -SV     Rehab Potential (PT) fair  -SV     Criteria for Skilled Interventions Met (PT) yes  -SV     Therapy Frequency (PT) 6 times/wk  -SV     Predicted Duration of Therapy Intervention (PT) 3-5 weeks  -SV       Row Name 12/12/24 1304          Vital Signs    Pretreatment Heart Rate (beats/min) 109  -SV     Intratreatment Heart Rate (beats/min) 128  -SV     Posttreatment Heart Rate (beats/min) 129  -SV     Pre SpO2 (%) 96  -SV     Intra SpO2 (%) 94  -SV     Pre Patient Position Supine  -SV     Intra Patient Position Supine  -SV     Post Patient Position Supine  -SV       Row Name 12/12/24 1304          Positioning and Restraints    Pre-Treatment Position in bed  -SV     Post Treatment Position bed  -SV     In Bed call light within reach;encouraged to call for assist;exit alarm on;supine  -SV               User Key  (r) = Recorded By, (t) = Taken By, (c) = Cosigned By      Initials Name Provider Type    Jolanta Hamilton PT Physical Therapist                   Outcome Measures       Row Name 12/12/24 1307          How much help from another person do you currently need...    Turning from your back to your side while in flat bed without using bedrails? 2  -SV     Moving from lying on back to sitting on the side of a flat bed without bedrails? 2  -SV     Moving to and from a bed to a chair (including a wheelchair)? 2  -SV     Standing up from a chair using your arms (e.g., wheelchair, bedside chair)? 2  -SV     Climbing 3-5 steps with a railing? 1  -SV     To walk in hospital room? 2  -SV     AM-PAC 6 Clicks Score (PT) 11  -SV               User Key  (r) = Recorded By, (t) = Taken By, (c) = Cosigned By      Initials Name Provider Type    Jolanta Hamilton PT Physical Therapist                                 Physical Therapy Education       Title: PT OT SLP Therapies (In  Progress)       Topic: Physical Therapy (In Progress)       Point: Mobility training (In Progress)       Learning Progress Summary            Patient Acceptance, E, NR by  at 12/12/2024 1308                      Point: Home exercise program (In Progress)       Learning Progress Summary            Patient Acceptance, E, NR by  at 12/12/2024 1308                                      User Key       Initials Effective Dates Name Provider Type Discipline     07/11/23 -  Jolanta Trinh, PT Physical Therapist PT                  PT Recommendation and Plan  Planned Therapy Interventions (PT): balance training, bed mobility training, gait training, home exercise program, patient/family education, stretching, strengthening, stair training, ROM (range of motion), transfer training        Time Calculation:         PT Charges       Row Name 12/12/24 1315             Time Calculation    Start Time 1246  -      Stop Time 1256  -      Time Calculation (min) 10 min  -      PT Received On 12/12/24  -      PT - Next Appointment 12/13/24  -      PT Goal Re-Cert Due Date 12/22/24  -                User Key  (r) = Recorded By, (t) = Taken By, (c) = Cosigned By      Initials Name Provider Type     Jolanta Trinh, PT Physical Therapist                  Therapy Charges for Today       Code Description Service Date Service Provider Modifiers Qty    49731277133 HC PT EVAL LOW COMPLEXITY 2 12/12/2024 Jolanta Trinh, PT GP 1            PT G-Codes  AM-PAC 6 Clicks Score (PT): 11  PT Discharge Summary  Anticipated Discharge Disposition (PT): home with assist, home with home health, skilled nursing facility (TBD)    Jolanta Trinh PT  12/12/2024

## 2024-12-12 NOTE — PROGRESS NOTES
"    Patient Name: Alanna Machuca  :1950  74 y.o.      Patient Care Team:  Provider, No Known as PCP - General    Chief Complaint: PNA CHF    Interval History: looks much better today. Sitting up no complaints       Objective   Vital Signs  Temp:  [97.5 °F (36.4 °C)-97.9 °F (36.6 °C)] 97.6 °F (36.4 °C)  Heart Rate:  [105-130] 109  Resp:  [18-20] 20  BP: ()/(63-76) 106/76    Intake/Output Summary (Last 24 hours) at 2024 1019  Last data filed at 2024 0600  Gross per 24 hour   Intake 240 ml   Output 600 ml   Net -360 ml     Flowsheet Rows      Flowsheet Row First Filed Value   Admission Height 157.5 cm (62\") Documented at 12/10/2024 2030   Admission Weight 52.2 kg (115 lb) Documented at 12/10/2024 2030            Physical Exam:   General Appearance:    Alert, cooperative, in no acute distress   Lungs:     Wheezes ronchi no rales.      Heart:    Regular rhythm and normal rate, normal S1 and S2, no murmurs, gallops or rubs.     Chest Wall:    No abnormalities observed   Abdomen:     Soft, nontender, positive bowel sounds.     Extremities:   no cyanosis, clubbing or edema.  No marked joint deformities.  Adequate musculoskeletal strength.       Results Review:    Results from last 7 days   Lab Units 24  0350   SODIUM mmol/L 141   POTASSIUM mmol/L 3.1*   CHLORIDE mmol/L 103   CO2 mmol/L 26.0   BUN mg/dL 18   CREATININE mg/dL 0.76   GLUCOSE mg/dL 109*   CALCIUM mg/dL 7.6*     Results from last 7 days   Lab Units 24  0646 12/10/24  1817 12/10/24  1714   HSTROP T ng/L 52* 49* 48*     Results from last 7 days   Lab Units 24  0350   WBC 10*3/mm3 14.04*   HEMOGLOBIN g/dL 9.2*   HEMATOCRIT % 26.6*   PLATELETS 10*3/mm3 452*         Results from last 7 days   Lab Units 12/10/24  1714   MAGNESIUM mg/dL 2.3                   Medication Review:   azithromycin, 500 mg, Intravenous, Once  cefTRIAXone, 1,000 mg, Intravenous, Q24H  enoxaparin, 1 mg/kg, Subcutaneous, Q12H  metoprolol succinate XL, " 12.5 mg, Oral, Q24H  pantoprazole, 40 mg, Oral, Daily  potassium chloride, 40 mEq, Oral, Q4H  QUEtiapine, 200 mg, Oral, BID  sodium chloride, 500 mL, Intravenous, Once         Pharmacy to Dose enoxaparin (LOVENOX),         Assessment & Plan   Mycoplasma pneumonia  Systolic cardiomyopathy not acute CHF  Sinus tachycardia  Her lungs don't sound wet but lots of wheezes. Ongoing tachycardia. Will give 500cc/2 hours NS. Toprol 12.5 well tolerated  Opal Farrell MD  [facility]  12/12/24  10:19 EST

## 2024-12-12 NOTE — PLAN OF CARE
Problem: Malnutrition  Goal: Improved Nutritional Intake  Outcome: Progressing   Goal Outcome Evaluation:   RD to follow

## 2024-12-12 NOTE — PROGRESS NOTES
"    Chief Complaint: Subcarinal mass    Subjective:  Symptoms:  Improved.  She reports cough.  No shortness of breath or chest pain.    Diet:  Poor intake.  No nausea or vomiting.    Activity level: Impaired due to weakness.    Pain:  She reports no pain.    In bed.  No new complaints.    Vital Signs:  Temp:  [97.5 °F (36.4 °C)-97.7 °F (36.5 °C)] 97.5 °F (36.4 °C)  Heart Rate:  [105-130] 129  Resp:  [18-20] 20  BP: ()/(55-76) 93/55    Intake & Output (last day)         12/11 0701 12/12 0700 12/12 0701 12/13 0700    P.O. 240     IV Piggyback  250    Total Intake(mL/kg) 240 (6.4) 250 (6.7)    Urine (mL/kg/hr) 600 (0.7)     Total Output 600     Net -360 +250                  Objective:  General Appearance:  Comfortable and in no acute distress.    Vital signs: (most recent): Blood pressure 93/55, pulse (!) 129, temperature 97.5 °F (36.4 °C), temperature source Oral, resp. rate 20, height 157.5 cm (62\"), weight 37.4 kg (82 lb 7.2 oz), SpO2 95%.    Lungs:  Normal effort and normal respiratory rate.  She is not in respiratory distress.    Heart: Normal rate.  Regular rhythm.    Abdomen: Abdomen is non-distended.    Neurological: Patient is alert and oriented to person, place and time.    Skin:  Warm and dry.              Results Review:     I reviewed the patient's new clinical results.  I reviewed the patient's new imaging results and agree with the interpretation.  Discussed with patient, nurse, surgeon    Imaging Results (Last 24 Hours)       ** No results found for the last 24 hours. **            Lab Results:     Lab Results (last 24 hours)       Procedure Component Value Units Date/Time    Blood Culture - Blood, Arm, Left [[id number]]  (Normal) Collected: 12/11/24 0646    Specimen: Blood from Arm, Left Updated: 12/12/24 0745     Blood Culture No growth at 24 hours    Blood Culture - Blood, Hand, Right [[id number]]  (Normal) Collected: 12/11/24 0657    Specimen: Blood from Hand, Right Updated: 12/12/24 0745     " Blood Culture No growth at 24 hours    Comprehensive Metabolic Panel [[id number]]  (Abnormal) Collected: 12/12/24 0350    Specimen: Blood Updated: 12/12/24 0541     Glucose 109 mg/dL      BUN 18 mg/dL      Creatinine 0.76 mg/dL      Sodium 141 mmol/L      Potassium 3.1 mmol/L      Chloride 103 mmol/L      CO2 26.0 mmol/L      Calcium 7.6 mg/dL      Total Protein 6.2 g/dL      Albumin 2.4 g/dL      ALT (SGPT) 30 U/L      AST (SGOT) 48 U/L      Alkaline Phosphatase 147 U/L      Total Bilirubin 0.4 mg/dL      Globulin 3.8 gm/dL      A/G Ratio 0.6 g/dL      BUN/Creatinine Ratio 23.7     Anion Gap 12.0 mmol/L      eGFR 81.8 mL/min/1.73     Narrative:      GFR Categories in Chronic Kidney Disease (CKD)      GFR Category          GFR (mL/min/1.73)    Interpretation  G1                     90 or greater         Normal or high (1)  G2                      60-89                Mild decrease (1)  G3a                   45-59                Mild to moderate decrease  G3b                   30-44                Moderate to severe decrease  G4                    15-29                Severe decrease  G5                    14 or less           Kidney failure          (1)In the absence of evidence of kidney disease, neither GFR category G1 or G2 fulfill the criteria for CKD.    eGFR calculation 2021 CKD-EPI creatinine equation, which does not include race as a factor    CBC (No Diff) [[id number]]  (Abnormal) Collected: 12/12/24 0350    Specimen: Blood Updated: 12/12/24 0429     WBC 14.04 10*3/mm3      RBC 3.25 10*6/mm3      Hemoglobin 9.2 g/dL      Hematocrit 26.6 %      MCV 81.8 fL      MCH 28.3 pg      MCHC 34.6 g/dL      RDW 13.8 %      RDW-SD 40.8 fl      MPV 10.0 fL      Platelets 452 10*3/mm3              Assessment & Plan       Mycoplasma pneumonia    Pulmonary embolism    Acute and chronic respiratory failure with hypoxia    LFTs abnormal    Sepsis due to pneumonia    Anemia, chronic disease    Schizophrenia       Assessment &  Plan  Appears clinically improved today.  On 2 L via nasal cannula.  Denies any shortness of breath orthopnea.  2.7 cm x 3.1 cm subcarinal mass noted on CTA performed 12/10/2024.  Possible reactive adenopathy although unable to exclude malignant etiology.  Patient denies any dysphagia.      Discussed with surgeon, recommend outpatient PET/CT imaging to better characterize.  Will allow her to recover from her acute issues in the interim. Will arrange outpatient PET/CT imaging along with subsequent appointment in our office.  Rest of care per primary team.  Please call with any questions.    AARON Browne  [facility]  12/12/24  15:48 EST

## 2024-12-12 NOTE — CASE MANAGEMENT/SOCIAL WORK
Discharge Planning Assessment  [facility]     Patient Name: Alanna Machuca  MRN: [id number]  Today's Date: 12/12/2024    Admit Date: 12/10/2024    Plan: Plan to return home with caregiver/Beverly.   Discharge Needs Assessment    No documentation.                  Discharge Plan       Row Name 12/12/24 0816       Plan    Plan Plan to return home with caregiver/Beverly.    Patient/Family in Agreement with Plan yes    Plan Comments Late entry from 12/11: CCP s/w pt’s guardian/Urszula Kelly via phone call to complete pt screening. Introduced self and role of CCP. Face sheet information and pharmacy verified. Pt lives in a 2 BELLO 2 story family home with Beverly Leonard/caregiver. Pt needs assistance with all ADLs. Beverly cooks, cleans, and provides medications to pt. Pt does not use DME. Pt does not have a living will. Pt is not enrolled in meds to beds and has no trouble affording or managing medications. Pt has not used HH or SNF in the past. SACHIN requested guardianship papers from Urszula and she will email paperwork to CCP email. Per Urszula it’s ok to give pt information to Beverly/Caregiver and it is ok for pt to have visitors. CCP s/w Beverly who also confirmed face sheet information and pharmacy. Beverly confirmed DC plan to return home and Beverly will transport. CCP manager updated regarding missing guardianship paperwork. Connor RN/CCP                  Continued Care and Services - Admitted Since 12/10/2024    No active coordination exists for this encounter.          Demographic Summary    No documentation.                  Functional Status    No documentation.                         Mari Nam RN

## 2024-12-12 NOTE — PLAN OF CARE
Goal Outcome Evaluation:         Pt admit  due to onset of respiratory failure, PE, mycoplasm PNA, abnormal liver tests, anemia ( 9.2), mental health problem. Pt tachycardic with HR elevating during activity.. hx of systolic cardiomyopathy. Per CCP pt lives in 2 story home with 2  BELLO. Pt unable to relay PLOF, use of AD , or describe home environment today. Pt has caregiver at  home but pt unable to name or relay 24 hour? Pt ageed to supine ex only. HR elevated to 129 during rom assessment and a few exercises supine. Pt demonstrated impaired endurance, general weakness, impaired rom jeannine ankles that would cause gait deviation and balance deficits. Anticipate need for 24 hour asst and fall risk. Pt will likely benefit from cont skilled PT to address deficits. Due to cognition, best goal is indep mobility without AD if able to progress.           Anticipated Discharge Disposition (PT): home with assist, home with home health, skilled nursing facility (TBD)

## 2024-12-12 NOTE — PLAN OF CARE
Goal Outcome Evaluation:  Plan of Care Reviewed With: patient           Outcome Evaluation: Pt AXO X2. Pt was hypocix, increased O2 to 2L, Pt was unable to swallow pils. Gave crushed meds with applesauce. Pt nutrition is very poor. Plan of care continued

## 2024-12-12 NOTE — PROGRESS NOTES
"    DAILY PROGRESS NOTE  [facility]    Patient Identification:  Name: Alanna Machuca  Age: 74 y.o.  Sex: female  :  1950  MRN: [id number]         Primary Care Physician: Provider, No Known    Subjective:  Interval History: Feeling little bit better today.  Still with some cough and shortness of air but denies any chest pain.  Admits to decreased appetite but denies any emesis or abdominal pain    Objective: Frail, quite petite, anxious but conversational and pleasant    Scheduled Meds:azithromycin, 500 mg, Intravenous, Once  cefTRIAXone, 1,000 mg, Intravenous, Q24H  enoxaparin, 1 mg/kg, Subcutaneous, Q12H  metoprolol succinate XL, 12.5 mg, Oral, Q24H  pantoprazole, 40 mg, Oral, Daily  potassium chloride, 40 mEq, Oral, Q4H  QUEtiapine, 200 mg, Oral, BID      Continuous Infusions:Pharmacy to Dose enoxaparin (LOVENOX),         Vital signs in last 24 hours:  Temp:  [97.5 °F (36.4 °C)-97.9 °F (36.6 °C)] 97.6 °F (36.4 °C)  Heart Rate:  [105-130] 109  Resp:  [18-20] 20  BP: ()/(63-76) 106/76    Intake/Output:    Intake/Output Summary (Last 24 hours) at 2024 1002  Last data filed at 2024 0600  Gross per 24 hour   Intake 240 ml   Output 600 ml   Net -360 ml       Exam:  /76 (BP Location: Left arm, Patient Position: Lying)   Pulse 109   Temp 97.6 °F (36.4 °C) (Oral)   Resp 20   Ht 157.5 cm (62\")   Wt 37.4 kg (82 lb 7.2 oz)   SpO2 96%   BMI 15.08 kg/m²     General Appearance:    Alert, cooperative, elderly and very frail, AAOx3, sickly                         Throat:   Oral mucosa pink and moist                           Neck:   No JVD                         Lungs:    Diffuse rales versus rhonchi to auscultation bilaterally, respirations unlabored/laying supine with no acute distress                 Chest Wall:    No tenderness or deformity                          Heart:    Regular rate and rhythm, S1 and S2 normal                  Abdomen:     Soft, nontender, bowel " sounds active                 Extremities: DWD moving all, no pitting edema                        Pulses:   Pulses palpable in all extremities                  Neurologic:   CNII-XII intact, no focal deficits noted     Data Review:  Labs in chart were reviewed.    Assessment:  Active Hospital Problems    Diagnosis  POA    **Mycoplasma pneumonia [J15.7]  Unknown    Acute and chronic respiratory failure with hypoxia [J96.21]  Unknown    LFTs abnormal [R79.89]  Unknown    Sepsis due to pneumonia [J18.9, A41.9]  Unknown    Anemia, chronic disease [D63.8]  Unknown    Schizophrenia [F20.9]  Unknown    Pulmonary embolism [I26.99]  Yes      Resolved Hospital Problems   No resolved problems to display.       Plan:    Acute hypoxic respiratory failure multifactorial due to Mycoplasma pneumoniae, PE but also quite concerning for new echocardiogram/cardiomyopathy with an EF of 30 to 35%   -Pharmacy dosing Lovenox   -Adjust azithromycin to IV for her last dose given reports of nausea   -Subcarinal mass being evaluated per TTS   -Cardiology consulted.  She received 40 mg of IV Lasix the other day and have avoided IV fluids since.  Await further recommendations   -BC NGTD with improving leukocytosis      Abnormal LFTs likely reactive/resolving with normal bilirubin    Anemia of chronic disease with hemoglobin essentially stable/thrombocytosis and no need to monitor CBC daily at this point    Hypokalemia-replace    GERD-PPI    Schizophrenia-Seroquel        Disposition -TBD with CCP following.  Now that she is starting to feel little better, will get PT evaluation    González Esparza MD  12/12/2024  10:02 EST

## 2024-12-13 ENCOUNTER — APPOINTMENT (OUTPATIENT)
Dept: GENERAL RADIOLOGY | Facility: HOSPITAL | Age: 74
DRG: 871 | End: 2024-12-13
Payer: MEDICARE

## 2024-12-13 PROBLEM — E43 SEVERE PROTEIN-CALORIE MALNUTRITION: Status: ACTIVE | Noted: 2024-01-01

## 2024-12-13 LAB
ANION GAP SERPL CALCULATED.3IONS-SCNC: 9 MMOL/L (ref 5–15)
BUN SERPL-MCNC: 14 MG/DL (ref 8–23)
BUN/CREAT SERPL: 20.9 (ref 7–25)
CALCIUM SPEC-SCNC: 7.8 MG/DL (ref 8.6–10.5)
CHLORIDE SERPL-SCNC: 106 MMOL/L (ref 98–107)
CO2 SERPL-SCNC: 28 MMOL/L (ref 22–29)
CREAT SERPL-MCNC: 0.67 MG/DL (ref 0.57–1)
EGFRCR SERPLBLD CKD-EPI 2021: 91.3 ML/MIN/1.73
GLUCOSE SERPL-MCNC: 108 MG/DL (ref 65–99)
POTASSIUM SERPL-SCNC: 4 MMOL/L (ref 3.5–5.2)
SODIUM SERPL-SCNC: 143 MMOL/L (ref 136–145)

## 2024-12-13 PROCEDURE — 25010000002 ENOXAPARIN PER 10 MG

## 2024-12-13 PROCEDURE — 25010000002 CEFTRIAXONE PER 250 MG: Performed by: HOSPITALIST

## 2024-12-13 PROCEDURE — 80048 BASIC METABOLIC PNL TOTAL CA: CPT | Performed by: HOSPITALIST

## 2024-12-13 PROCEDURE — 99232 SBSQ HOSP IP/OBS MODERATE 35: CPT | Performed by: NURSE PRACTITIONER

## 2024-12-13 PROCEDURE — 92610 EVALUATE SWALLOWING FUNCTION: CPT | Performed by: SPEECH-LANGUAGE PATHOLOGIST

## 2024-12-13 PROCEDURE — 25810000003 SODIUM CHLORIDE 0.9 % SOLUTION: Performed by: NURSE PRACTITIONER

## 2024-12-13 PROCEDURE — 71046 X-RAY EXAM CHEST 2 VIEWS: CPT

## 2024-12-13 RX ORDER — SODIUM CHLORIDE 9 MG/ML
50 INJECTION, SOLUTION INTRAVENOUS CONTINUOUS
Status: DISCONTINUED | OUTPATIENT
Start: 2024-12-13 | End: 2024-12-14

## 2024-12-13 RX ORDER — DIPHENHYDRAMINE HYDROCHLORIDE AND LIDOCAINE HYDROCHLORIDE AND ALUMINUM HYDROXIDE AND MAGNESIUM HYDRO
5 KIT EVERY 6 HOURS
Status: DISCONTINUED | OUTPATIENT
Start: 2024-12-13 | End: 2024-12-13

## 2024-12-13 RX ORDER — ACETAMINOPHEN 160 MG/5ML
650 SOLUTION ORAL EVERY 4 HOURS PRN
Status: DISCONTINUED | OUTPATIENT
Start: 2024-12-13 | End: 2024-12-16

## 2024-12-13 RX ADMIN — DIPHENHYDRAMINE HCL ORAL 5 ML: 25 SOLUTION ORAL at 23:26

## 2024-12-13 RX ADMIN — SODIUM CHLORIDE 50 ML/HR: 9 INJECTION, SOLUTION INTRAVENOUS at 12:25

## 2024-12-13 RX ADMIN — QUETIAPINE FUMARATE 200 MG: 50 TABLET ORAL at 21:54

## 2024-12-13 RX ADMIN — ENOXAPARIN SODIUM 40 MG: 100 INJECTION SUBCUTANEOUS at 08:48

## 2024-12-13 RX ADMIN — ENOXAPARIN SODIUM 40 MG: 100 INJECTION SUBCUTANEOUS at 21:54

## 2024-12-13 RX ADMIN — METOPROLOL SUCCINATE 12.5 MG: 25 TABLET, EXTENDED RELEASE ORAL at 08:50

## 2024-12-13 RX ADMIN — QUETIAPINE FUMARATE 200 MG: 50 TABLET ORAL at 08:50

## 2024-12-13 RX ADMIN — CEFTRIAXONE SODIUM 1000 MG: 1 INJECTION, POWDER, FOR SOLUTION INTRAMUSCULAR; INTRAVENOUS at 08:50

## 2024-12-13 RX ADMIN — ACETAMINOPHEN 650 MG: 650 LIQUID ORAL at 02:09

## 2024-12-13 NOTE — THERAPY EVALUATION
Acute Care - Speech Language Pathology   Swallow Initial Evaluation [facility]     Patient Name: Alanna Machuca  : 1950  MRN: [id number]  Today's Date: 2024               Admit Date: 12/10/2024    Visit Dx:     ICD-10-CM ICD-9-CM   1. Pneumonia of both lungs due to Mycoplasma pneumoniae, unspecified part of lung  J15.7 483.0   2. Other acute pulmonary embolism, unspecified whether acute cor pulmonale present  I26.99 415.19   3. Hypoxia  R09.02 799.02   4. Elevated brain natriuretic peptide (BNP) level  R79.89 790.99   5. 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 No pertinent past medical history.  History reviewed. No pertinent surgical history.    SLP Recommendation and Plan  SLP Swallowing Diagnosis: oral dysphagia (24)  SLP Diet Recommendation: soft to chew textures, ground, thin liquids (24)  Recommended Precautions and Strategies: upright posture during/after eating, small bites of food and sips of liquid (24)  SLP Rec. for Method of Medication Administration: as tolerated (24)     Monitor for Signs of Aspiration: notify SLP if any concerns (24)  Recommended Diagnostics: reassess via clinical swallow evaluation (24)           Therapy Frequency (Swallow): PRN (24)  Predicted Duration Therapy Intervention (Days): until discharge (24)  Oral Care Recommendations: Oral Care BID/PRN (24)                                        Outcome Evaluation: Clinical swallow eval completed.  Pt with baseline cough upon entrance to room. Pt  demonstrated intermittent coughing during eval, but no coughing w/ individual swallows. Pt accepted only minimal amounts of food/drink. Pt accepting mostly smooth foods such as pudding, jello, etc. Will accept liquids by both cup and straw in single small sips. Adequate oral prep and clearance.  Discussed with cousin and caregiver who report no difficulty with swallow prior to these events, eating pork chops, etc.  Explained risk of aspiration with lengthy mastication and effects of weakness.  Recommned soft diet w/ ground meats, thin liquids.  Meds as tolerated.  Upright with all po.  Encourage and assist with meals      SWALLOW EVALUATION (Last 72 Hours)       SLP Adult Swallow Evaluation       Row Name 12/13/24 1500                   Rehab Evaluation    Document Type evaluation  -SA        Subjective Information no complaints  -SA        Patient Observations alert  -SA        Patient/Family/Caregiver Comments/Observations cousin and caregiver present  -SA        Patient Effort fair  -SA           General Information    Patient Profile Reviewed yes  -SA        Pertinent History Of Current Problem mycoplasma pna; h/o schizophreniea  -SA        Current Method of Nutrition regular textures;thin liquids  -SA        Precautions/Limitations, Vision WFL;for purposes of eval  -SA        Precautions/Limitations, Hearing WFL;for purposes of eval  -SA        Prior Level of Function-Communication WFL  -SA        Prior Level of Function-Swallowing no diet consistency restrictions  -        Plans/Goals Discussed with patient and family;agreed upon  -        Barriers to Rehab medically complex  -SA        Patient's Goals for Discharge patient did not state  -SA        Family Goals for Discharge family did not state  -SA           Clinical Swallow Eval    Clinical Swallow Evaluation Summary Pt with baseline cough upon entrance to room.  Pt demonstrated intermittent coughing during eval, but no coughing w/ individual swallows.   Pt accepted only minimal amounts of food/drink.  Pt accepting mostly smooth foods such as pudding, jello, etc.  Will accept liquids by both cup and straw in single small sips.  Adequate oral prep and clearance.  -           SLP Evaluation Clinical Impression    SLP Swallowing Diagnosis oral dysphagia  -        Functional Impact risk of malnutrition;risk of dehydration  -           Recommendations    Therapy Frequency (Swallow) PRN  -SA        Predicted Duration Therapy Intervention (Days) until discharge  -        SLP Diet Recommendation soft to chew textures;ground;thin liquids  -        Recommended Diagnostics reassess via clinical swallow evaluation  -        Recommended Precautions and Strategies upright posture during/after eating;small bites of food and sips of liquid  -        Oral Care Recommendations Oral Care BID/PRN  -SA        SLP Rec. for Method of Medication Administration as tolerated  -        Monitor for Signs of Aspiration notify SLP if any concerns  -                  User Key  (r) = Recorded By, (t) = Taken By, (c) = Cosigned By      Initials Name Effective Dates    Astrid Gardner SLP 01/11/24 -                     EDUCATION  The patient has been educated in the following areas:   Dysphagia (Swallowing Impairment) Oral Care/Hydration Modified Diet Instruction.                Time Calculation:    Time Calculation- SLP       Row Name 12/13/24 1631             Time Calculation- SLP    SLP Start Time 1430  -      SLP Received On 12/13/24  -                User Key  (r) = Recorded By, (t) = Taken By, (c) = Cosigned By      Initials Name Provider Type    Astrid Gardner SLP Speech and Language Pathologist                    Therapy Charges for Today       Code Description Service Date Service Provider Modifiers Qty    [id number]  ST EVAL ORAL PHARYNG SWALLOW 4 12/13/2024 Astrid Coronado SLP GN 1                 TARSHA Ortiz  12/13/2024

## 2024-12-13 NOTE — PROGRESS NOTES
Hospital Follow Up    LOS:  LOS: 3 days   Patient Name: Alanna Machuca  Age/Sex: 74 y.o. female  : 1950  MRN: [id number]    Day of Service: 24   Length of Stay: 3  Encounter Provider: AARON Llanes  Place of Service: [facility] CARDIOLOGY  Patient Care Team:  Provider, No Known as PCP - General    Subjective:     Chief Complaint: shortness of breath    Interval History: complains of not feeling well today. Feels tired. Sinus tach on monitor 135    Objective:     Objective:  Temp:  [97.3 °F (36.3 °C)-97.5 °F (36.4 °C)] 97.3 °F (36.3 °C)  Heart Rate:  [100-129] 105  Resp:  [16-20] 16  BP: ()/(55-79) 84/58     Intake/Output Summary (Last 24 hours) at 2024 1052  Last data filed at 2024 0543  Gross per 24 hour   Intake 363 ml   Output 400 ml   Net -37 ml     Body mass index is 13.99 kg/m².      24  0842 24  0554 24  0543   Weight: 36.7 kg (81 lb) 37.4 kg (82 lb 7.2 oz) 34.7 kg (76 lb 8 oz)     Weight change: -2.041 kg (-4 lb 8 oz)    Physical Exam:   General Appearance:    Awake alert and oriented in no acute distress. frail/ill appearing   Color:  Skin:  Neuro:  HEENT:    Lungs:     Pale pink.   Warm and dry  No focal, motor or sensory deficits  Neck supple, pupils equal, round and reactive. No JVD, No Bruit  Scattered crackles bilaterally to auscultation,respirations regular, even and                  unlabored    Heart:    Regular rate and rhythm, tachycardic S1 and S2, no murmur, no gallop, no rub. No edema, DP/PT pulses are 2+   Chest Wall:    No abnormalities observed   Abdomen:     Normal bowel sounds, no masses, no organomegaly, soft        non-tender, non-distended, no guarding, no ascites noted   Extremities:   Moves all extremities well, no edema, no cyanosis, no redness       Lab Review:   Results from last 7 days   Lab Units 24  0834 24  0350 24  0311 12/10/24  1714   SODIUM mmol/L 143 141   < >  "135*   POTASSIUM mmol/L 4.0 3.1*   < > 3.5   CHLORIDE mmol/L 106 103   < > 99   CO2 mmol/L 28.0 26.0   < > 22.8   BUN mg/dL 14 18   < > 18   CREATININE mg/dL 0.67 0.76   < > 1.01*   GLUCOSE mg/dL 108* 109*   < > 176*   CALCIUM mg/dL 7.8* 7.6*   < > 8.1*   AST (SGOT) U/L  --  48*  --  41*   ALT (SGPT) U/L  --  30  --  39*    < > = values in this interval not displayed.     Results from last 7 days   Lab Units 12/11/24  0646 12/10/24  1817 12/10/24  1714   HSTROP T ng/L 52* 49* 48*     Results from last 7 days   Lab Units 12/12/24  0350 12/11/24  0311   WBC 10*3/mm3 14.04* 20.46*   HEMOGLOBIN g/dL 9.2* 9.5*   HEMATOCRIT % 26.6* 27.7*   PLATELETS 10*3/mm3 452* 601*         Results from last 7 days   Lab Units 12/10/24  1714   MAGNESIUM mg/dL 2.3           Invalid input(s): \"LDLCALC\"  Results from last 7 days   Lab Units 12/10/24  1714   PROBNP pg/mL 10,957.0*         I reviewed the patient's new clinical results.  I personally viewed and interpreted the patient's EKG  Current Medications:   Scheduled Meds:cefTRIAXone, 1,000 mg, Intravenous, Q24H  enoxaparin, 1 mg/kg, Subcutaneous, Q12H  metoprolol succinate XL, 12.5 mg, Oral, Q24H  pantoprazole, 40 mg, Oral, Daily  QUEtiapine, 200 mg, Oral, BID      Continuous Infusions:Pharmacy to Dose enoxaparin (LOVENOX),         Allergies:  No Known Allergies    Assessment/Plan:        1.  Mycoplasma pneumonia-on ceftriaxone    2.  Chronic systolic heart failure, cardiomyopathy-EF 30 to 35% on metoprolol 12.5 mg daily dosing limited due to low blood pressure.  Received a fluid dose yesterday for hypertension.  She has no JVD or edema today she does have some faint crackles on exam.  Would not introduce diuretic at this time.    3.  Sinus tachycardia-heart rate has been in the low 100s through the night and this morning, when I saw her she was sinus tach at 130.  Continue metoprolol succinate 12.5 mg daily optimize dose as tolerated but last blood pressure was 84/58.  Will give some " gentle fluids today saline 50 an hour recheck chest x-ray this afternoon    4.  Subcarinal mass-being evaluated for malignancy    5.  Schizophrenia  AARON Llanes  12/13/24  10:52 EST  Electronically signed by AARON Llanes, 12/13/24, 10:52 AM EST.

## 2024-12-13 NOTE — PLAN OF CARE
Goal Outcome Evaluation:  Plan of Care Reviewed With: patient           Outcome Evaluation: No acute changes overnight. Pt tearful on and off throughout the night stating she is tired and her legs hurt. PRN tylenol given. Some difficulty taking pills, meds given whole with applesauce. Encouraging PO intake, pt had 1 piece of toast and applesauce overnight. O2 weaned to 1L. AM labs pending.                        Problem: Adult Inpatient Plan of Care  Goal: Plan of Care Review  Flowsheets (Taken 12/13/2024 0529)  Outcome Evaluation: No acute changes overnight. Pt tearful on and off throughout the night stating she is tired and her legs hurt. PRN tylenol given. Some difficulty taking pills, meds given whole with applesauce. Encouraging PO intake, pt had 1 piece of toast and applesauce overnight. O2 weaned to 1L. AM labs pending.  Plan of Care Reviewed With: patient  Goal: Absence of Hospital-Acquired Illness or Injury  Intervention: Identify and Manage Fall Risk  Recent Flowsheet Documentation  Taken 12/13/2024 0407 by Betsy Mccoy RN  Safety Promotion/Fall Prevention: safety round/check completed  Taken 12/13/2024 0206 by Betsy Mccoy RN  Safety Promotion/Fall Prevention: safety round/check completed  Taken 12/13/2024 0019 by Betsy Mccoy RN  Safety Promotion/Fall Prevention: safety round/check completed  Taken 12/12/2024 2220 by Betsy Mccoy RN  Safety Promotion/Fall Prevention: safety round/check completed  Taken 12/12/2024 2002 by Betsy Mccoy RN  Safety Promotion/Fall Prevention: safety round/check completed  Intervention: Prevent Skin Injury  Recent Flowsheet Documentation  Taken 12/13/2024 0407 by Betsy Mccoy RN  Body Position:   supine   patient/family refused  Taken 12/13/2024 0206 by Betsy Mccoy RN  Body Position:   weight shifting   supine   sitting up in bed  Taken 12/13/2024 0019 by Betsy Mccoy RN  Body Position:   supine   patient/family refused  Taken 12/12/2024 2220 by Betsy Mccoy  RN  Body Position:   supine   patient/family refused  Taken 12/12/2024 2002 by Betsy Mccoy RN  Body Position:   weight shifting   supine  Intervention: Prevent and Manage VTE (Venous Thromboembolism) Risk  Recent Flowsheet Documentation  Taken 12/12/2024 2002 by Betsy Mccoy RN  VTE Prevention/Management: (lovenox) --  Goal: Optimal Comfort and Wellbeing  Intervention: Monitor Pain and Promote Comfort  Recent Flowsheet Documentation  Taken 12/13/2024 0206 by Betsy Mccoy RN  Pain Management Interventions: pain medication given  Intervention: Provide Person-Centered Care  Recent Flowsheet Documentation  Taken 12/12/2024 2002 by Betsy Mccoy RN  Trust Relationship/Rapport:   care explained   choices provided     Problem: Violence Risk or Actual  Goal: Anger and Impulse Control  Intervention: Minimize Safety Risk  Recent Flowsheet Documentation  Taken 12/13/2024 0407 by Betsy Mccoy RN  Enhanced Safety Measures: bed alarm set  Taken 12/13/2024 0206 by Betsy Mccoy RN  Enhanced Safety Measures: bed alarm set  Taken 12/13/2024 0019 by Betsy Mccoy RN  Enhanced Safety Measures: bed alarm set  Taken 12/12/2024 2220 by Betsy Mccoy RN  Enhanced Safety Measures: bed alarm set  Taken 12/12/2024 2002 by Betsy Mccoy RN  Enhanced Safety Measures: bed alarm set     Problem: Sepsis/Septic Shock  Goal: Absence of Infection Signs and Symptoms  Intervention: Promote Recovery  Recent Flowsheet Documentation  Taken 12/12/2024 2002 by Betsy Mccoy RN  Activity Management: activity encouraged     Problem: Skin Injury Risk Increased  Goal: Skin Health and Integrity  Intervention: Optimize Skin Protection  Recent Flowsheet Documentation  Taken 12/13/2024 0407 by Betsy Mccoy RN  Head of Bed (HOB) Positioning: HOB at 20-30 degrees  Taken 12/13/2024 0206 by Betsy Mccoy RN  Head of Bed (HOB) Positioning:   HOB elevated   HOB at 60-90 degrees  Taken 12/13/2024 0019 by Betsy Mccoy RN  Head of Bed (HOB) Positioning:  HOB flat  Taken 12/12/2024 2220 by Betsy Mccoy RN  Head of Bed (HOB) Positioning: HOB flat  Taken 12/12/2024 2002 by Betsy Mccoy RN  Activity Management: activity encouraged  Head of Bed (HOB) Positioning: HOB flat     Problem: Fall Injury Risk  Goal: Absence of Fall and Fall-Related Injury  Intervention: Identify and Manage Contributors  Recent Flowsheet Documentation  Taken 12/13/2024 0206 by Betsy Mccoy RN  Medication Review/Management: medications reviewed  Taken 12/12/2024 2220 by Betsy Mccoy RN  Medication Review/Management: medications reviewed  Taken 12/12/2024 2002 by Betsy Mccoy RN  Medication Review/Management: medications reviewed  Intervention: Promote Injury-Free Environment  Recent Flowsheet Documentation  Taken 12/13/2024 0407 by Betsy Mccoy RN  Safety Promotion/Fall Prevention: safety round/check completed  Taken 12/13/2024 0206 by Betsy Mccoy RN  Safety Promotion/Fall Prevention: safety round/check completed  Taken 12/13/2024 0019 by Betsy Mccoy RN  Safety Promotion/Fall Prevention: safety round/check completed  Taken 12/12/2024 2220 by Betsy Mccoy RN  Safety Promotion/Fall Prevention: safety round/check completed  Taken 12/12/2024 2002 by Betsy Mccoy RN  Safety Promotion/Fall Prevention: safety round/check completed

## 2024-12-13 NOTE — SIGNIFICANT NOTE
12/13/24 1109   OTHER   Discipline physical therapist   Rehab Time/Intention   Session Not Performed patient/family declined, not feeling well  (Pt refusal d/t not feeling well. Declines supine ther-ex as well despite education on benefit. PT to follow up over the weekend as appropriate.)   Therapy Assessment/Plan (PT)   Criteria for Skilled Interventions Met (PT) yes   Recommendation   PT - Next Appointment 12/14/24

## 2024-12-13 NOTE — PROGRESS NOTES
"    Chief Complaint: Subcarinal mass    Subjective:  Symptoms:  Improved.  She reports cough.  No shortness of breath or chest pain.    Diet:  Poor intake.  No nausea or vomiting.    Activity level: Impaired due to weakness.    Pain:  She reports no pain.    In bed.  No new complaints.    Vital Signs:  Temp:  [97.3 °F (36.3 °C)-97.5 °F (36.4 °C)] 97.3 °F (36.3 °C)  Heart Rate:  [100-130] 130  Resp:  [16] 16  BP: ()/(58-80) 107/67    Intake & Output (last day)         12/12 0701  12/13 0700 12/13 0701  12/14 0700    P.O. 113     IV Piggyback 250     Total Intake(mL/kg) 363 (10.5)     Urine (mL/kg/hr) 400 (0.5)     Total Output 400     Net -37                   Objective:  General Appearance:  Comfortable and in no acute distress.    Vital signs: (most recent): Blood pressure 107/67, pulse (!) 130, temperature 97.3 °F (36.3 °C), temperature source Oral, resp. rate 16, height 157.5 cm (62\"), weight 34.7 kg (76 lb 8 oz), SpO2 97%.    Lungs:  Normal effort and normal respiratory rate.  She is not in respiratory distress.    Heart: Normal rate.  Regular rhythm.    Abdomen: Abdomen is non-distended.    Neurological: Patient is alert and oriented to person, place and time.    Skin:  Warm and dry.              Results Review:     I reviewed the patient's new clinical results.  I reviewed the patient's new imaging results and agree with the interpretation.  Discussed with patient, nurse, surgeon    Imaging Results (Last 24 Hours)       Procedure Component Value Units Date/Time    XR Chest PA & Lateral [[id number]] Collected: 12/13/24 1313     Updated: 12/13/24 1327    Narrative:      XR CHEST PA AND LATERAL-     HISTORY: Female who is 74 years-old, short of breath     TECHNIQUE: Frontal and lateral views of the chest     COMPARISON: 12/10/2024     FINDINGS: The heart size is borderline. Diffuse bilateral alveolar  interstitial opacities show interval worsening, continued follow-up  advised. No pleural effusion or " pneumothorax. No acute osseous process.       Impression:      As described.     This report was finalized on 12/13/2024 1:24 PM by Dr. Bc Luna M.D on Workstation: [employer]               Lab Results:     Lab Results (last 24 hours)       Procedure Component Value Units Date/Time    Basic Metabolic Panel [[id number]]  (Abnormal) Collected: 12/13/24 0834    Specimen: Blood Updated: 12/13/24 0908     Glucose 108 mg/dL      BUN 14 mg/dL      Creatinine 0.67 mg/dL      Sodium 143 mmol/L      Potassium 4.0 mmol/L      Chloride 106 mmol/L      CO2 28.0 mmol/L      Calcium 7.8 mg/dL      BUN/Creatinine Ratio 20.9     Anion Gap 9.0 mmol/L      eGFR 91.3 mL/min/1.73     Narrative:      GFR Categories in Chronic Kidney Disease (CKD)      GFR Category          GFR (mL/min/1.73)    Interpretation  G1                     90 or greater         Normal or high (1)  G2                      60-89                Mild decrease (1)  G3a                   45-59                Mild to moderate decrease  G3b                   30-44                Moderate to severe decrease  G4                    15-29                Severe decrease  G5                    14 or less           Kidney failure          (1)In the absence of evidence of kidney disease, neither GFR category G1 or G2 fulfill the criteria for CKD.    eGFR calculation 2021 CKD-EPI creatinine equation, which does not include race as a factor    Blood Culture - Blood, Arm, Left [[id number]]  (Normal) Collected: 12/11/24 0646    Specimen: Blood from Arm, Left Updated: 12/13/24 0745     Blood Culture No growth at 2 days    Blood Culture - Blood, Hand, Right [[id number]]  (Normal) Collected: 12/11/24 0657    Specimen: Blood from Hand, Right Updated: 12/13/24 0745     Blood Culture No growth at 2 days             Assessment & Plan       Mycoplasma pneumonia    Pulmonary embolism    Acute and chronic respiratory failure with hypoxia    LFTs abnormal    Sepsis due to pneumonia     Anemia, chronic disease    Schizophrenia    Severe protein-calorie malnutrition       Assessment & Plan    Subcarinal mass: measures 2.7 cm x 3.1 cm on CTA performed 12/10/2024.  Possible reactive adenopathy although unable to exclude malignant etiology.  Patient denies any dysphagia.      PET scan is scheduled for 12/20/2024.  She will follow-up in the office with Dr. Umaña to discuss results and determine further plan of care.    Discharge per primary service.    Shreya Calloway DNP, APRN  [facility]  12/13/24  15:54 EST

## 2024-12-13 NOTE — PROGRESS NOTES
"    DAILY PROGRESS NOTE  [facility]    Patient Identification:  Name: Alanna Machuca  Age: 74 y.o.  Sex: female  :  1950  MRN: [id number]         Primary Care Physician: Provider, No Known    Subjective:  Interval History: Extremely frail elderly lady.  Her speech is difficult to understand at times.  She seems to have advanced malnutrition and psychiatric disease but otherwise tries to communicate and talk but difficult to understand.  Oxygen improving and states she may be breathing a bit better.  Denies any current chest pain.  She also states that she feels awful overall    Objective: No family at bedside.  Case discussed in MDR    Scheduled Meds:cefTRIAXone, 1,000 mg, Intravenous, Q24H  enoxaparin, 1 mg/kg, Subcutaneous, Q12H  metoprolol succinate XL, 12.5 mg, Oral, Q24H  pantoprazole, 40 mg, Oral, Daily  QUEtiapine, 200 mg, Oral, BID      Continuous Infusions:Pharmacy to Dose enoxaparin (LOVENOX),   sodium chloride, 50 mL/hr        Vital signs in last 24 hours:  Temp:  [97.3 °F (36.3 °C)-97.5 °F (36.4 °C)] 97.3 °F (36.3 °C)  Heart Rate:  [100-129] 105  Resp:  [16-20] 16  BP: ()/(55-80) 110/80    Intake/Output:    Intake/Output Summary (Last 24 hours) at 2024 1155  Last data filed at 2024 0543  Gross per 24 hour   Intake 363 ml   Output 400 ml   Net -37 ml       Exam:  /80 (BP Location: Left arm, Patient Position: Lying)   Pulse 105   Temp 97.3 °F (36.3 °C) (Oral)   Resp 16   Ht 157.5 cm (62\")   Wt 34.7 kg (76 lb 8 oz)   SpO2 99%   BMI 13.99 kg/m²     General Appearance:    Alert, cooperative, very frail, not exactly thriving                          Head:    Normocephalic, without obvious abnormality, atraumatic                           Eyes:    PERRLA/EOM's intact, both eyes                         Throat: No lesions of the oropharynx, melanosis on tongue/dry OP.  No dentition                           Neck:   Supple, no rigidity LAD or JVD               "           Lungs:    Improved rhonchi to auscultation bilaterally, respirations unlabored at this time laying supine                 Chest Wall:    No tenderness or deformity                          Heart:    Regular rate and rhythm, S1 and S2 normal                  Abdomen:     Soft, nontender, bowel sounds active                 Extremities: No volume overload                        Pulses:   Pulses palpable in all extremities                  Neurologic:   CNII-XII intact     Data Review:  Labs in chart were reviewed.    Assessment:  Active Hospital Problems    Diagnosis  POA    **Mycoplasma pneumonia [J15.7]  Unknown    Severe protein-calorie malnutrition [E43]  Yes    Acute and chronic respiratory failure with hypoxia [J96.21]  Unknown    LFTs abnormal [R79.89]  Unknown    Sepsis due to pneumonia [J18.9, A41.9]  Unknown    Anemia, chronic disease [D63.8]  Unknown    Schizophrenia [F20.9]  Unknown    Pulmonary embolism [I26.99]  Yes      Resolved Hospital Problems   No resolved problems to display.       Plan:    Multifactorial acute hypoxic respiratory failure   -O2 requirements improving down to 1 L   -Azithromycin for mycoplasma   -PE on Lovenox   -Subcarinal mass with TTS planning on outpatient PET but no intervention at this time   -New CHF with EF 30 to 35% per echo.  Cardiology consulted and suggest no new diuretics at this time.  Status post IVF per cards        Mouthwash for dry oral pharynx but no lesions identified    Reactive abnormal LFTs with normal bilirubin    ACD/thrombocytosis stable hypokalemia replaced    GERD-PPI    Schizophrenia-Seroquel        Disposition CCP coordinating.  Likely will need SNF.  Overall body habitus concerning for poor long-term prognosis    González Esparza MD  12/13/2024  11:55 EST

## 2024-12-13 NOTE — CASE MANAGEMENT/SOCIAL WORK
Continued Stay Note  [facility]     Patient Name: Alanna Machuca  MRN: [id number]  Today's Date: 12/13/2024    Admit Date: 12/10/2024    Plan: Plan is to return home with Beverly/Caregiver. Beverly to transport at DC.   Discharge Plan       Row Name 12/13/24 1447       Plan    Plan Plan is to return home with Beverly/Caregiver. Beverly to transport at DC.    Patient/Family in Agreement with Plan yes    Plan Comments Clinicals reviewed. Pt did not work with PT today. Plan remains return home with Beverly/Caregvier. CCP will continue to follow for medical DC readiness and any DC needs. RLutes RN/CCP                   Discharge Codes    No documentation.                 Expected Discharge Date and Time       Expected Discharge Date Expected Discharge Time    Dec 13, 2024               Mari Nam RN

## 2024-12-13 NOTE — PLAN OF CARE
Goal Outcome Evaluation:              Outcome Evaluation: Clinical swallow eval completed.  Pt with baseline cough upon entrance to room. Pt demonstrated intermittent coughing during eval, but no coughing w/ individual swallows. Pt accepted only minimal amounts of food/drink. Pt accepting mostly smooth foods such as pudding, jello, etc. Will accept liquids by both cup and straw in single small sips. Adequate oral prep and clearance.  Discussed with cousin and caregiver who report no difficulty with swallow prior to these events, eating pork chops, etc.  Explained risk of aspiration with lengthy mastication and effects of weakness.  Recommned soft diet w/ ground meats, thin liquids.  Meds as tolerated.  Upright with all po.  Encourage and assist with meals               SLP Swallowing Diagnosis: oral dysphagia (12/13/24 1500)

## 2024-12-14 LAB
DEPRECATED RDW RBC AUTO: 43.1 FL (ref 37–54)
ERYTHROCYTE [DISTWIDTH] IN BLOOD BY AUTOMATED COUNT: 14 % (ref 12.3–15.4)
HCT VFR BLD AUTO: 27.9 % (ref 34–46.6)
HGB BLD-MCNC: 9.5 G/DL (ref 12–15.9)
MCH RBC QN AUTO: 28.7 PG (ref 26.6–33)
MCHC RBC AUTO-ENTMCNC: 34.1 G/DL (ref 31.5–35.7)
MCV RBC AUTO: 84.3 FL (ref 79–97)
NT-PROBNP SERPL-MCNC: 8301 PG/ML (ref 0–1800)
PLATELET # BLD AUTO: 514 10*3/MM3 (ref 140–450)
PMV BLD AUTO: 10.1 FL (ref 6–12)
RBC # BLD AUTO: 3.31 10*6/MM3 (ref 3.77–5.28)
WBC NRBC COR # BLD AUTO: 10.72 10*3/MM3 (ref 3.4–10.8)

## 2024-12-14 PROCEDURE — 83880 ASSAY OF NATRIURETIC PEPTIDE: CPT | Performed by: INTERNAL MEDICINE

## 2024-12-14 PROCEDURE — 25010000002 FUROSEMIDE PER 20 MG: Performed by: INTERNAL MEDICINE

## 2024-12-14 PROCEDURE — 25010000002 ENOXAPARIN PER 10 MG

## 2024-12-14 PROCEDURE — 99232 SBSQ HOSP IP/OBS MODERATE 35: CPT | Performed by: INTERNAL MEDICINE

## 2024-12-14 PROCEDURE — 25010000002 GLYCOPYRROLATE 0.2 MG/ML SOLUTION: Performed by: HOSPITALIST

## 2024-12-14 PROCEDURE — 25010000002 PIPERACILLIN SOD-TAZOBACTAM PER 1 G: Performed by: HOSPITALIST

## 2024-12-14 PROCEDURE — 85027 COMPLETE CBC AUTOMATED: CPT | Performed by: HOSPITALIST

## 2024-12-14 RX ORDER — FUROSEMIDE 10 MG/ML
40 INJECTION INTRAMUSCULAR; INTRAVENOUS EVERY 8 HOURS
Status: DISCONTINUED | OUTPATIENT
Start: 2024-12-14 | End: 2024-12-15

## 2024-12-14 RX ORDER — GLYCOPYRROLATE 0.2 MG/ML
0.4 INJECTION INTRAMUSCULAR; INTRAVENOUS ONCE
Status: COMPLETED | OUTPATIENT
Start: 2024-12-14 | End: 2024-12-14

## 2024-12-14 RX ADMIN — QUETIAPINE FUMARATE 200 MG: 50 TABLET ORAL at 09:03

## 2024-12-14 RX ADMIN — QUETIAPINE FUMARATE 200 MG: 50 TABLET ORAL at 21:19

## 2024-12-14 RX ADMIN — GLYCOPYRROLATE 0.4 MG: 0.2 INJECTION INTRAMUSCULAR; INTRAVENOUS at 10:04

## 2024-12-14 RX ADMIN — METOPROLOL SUCCINATE 12.5 MG: 25 TABLET, EXTENDED RELEASE ORAL at 09:03

## 2024-12-14 RX ADMIN — PANTOPRAZOLE SODIUM 40 MG: 40 TABLET, DELAYED RELEASE ORAL at 09:03

## 2024-12-14 RX ADMIN — PIPERACILLIN AND TAZOBACTAM 3.38 G: 3; .375 INJECTION, POWDER, FOR SOLUTION INTRAVENOUS at 16:50

## 2024-12-14 RX ADMIN — FUROSEMIDE 40 MG: 10 INJECTION, SOLUTION INTRAMUSCULAR; INTRAVENOUS at 12:37

## 2024-12-14 RX ADMIN — ENOXAPARIN SODIUM 40 MG: 100 INJECTION SUBCUTANEOUS at 21:20

## 2024-12-14 RX ADMIN — ENOXAPARIN SODIUM 40 MG: 100 INJECTION SUBCUTANEOUS at 09:03

## 2024-12-14 RX ADMIN — PIPERACILLIN AND TAZOBACTAM 3.38 G: 3; .375 INJECTION, POWDER, FOR SOLUTION INTRAVENOUS at 12:37

## 2024-12-14 RX ADMIN — FUROSEMIDE 40 MG: 10 INJECTION, SOLUTION INTRAMUSCULAR; INTRAVENOUS at 21:20

## 2024-12-14 NOTE — PROGRESS NOTES
"    DAILY PROGRESS NOTE  [facility]    Patient Identification:  Name: Alanna Machuca  Age: 74 y.o.  Sex: female  :  1950  MRN: [id number]         Primary Care Physician: Provider, No Known    Subjective:  Interval History: Not really feeling much better.  Still with shortness of breath still with cough.  Denies any fever.  Problems swallowing noted    Objective: She is just simply not thriving.  She is weak her swallow was weak and she has not perked up much over the last couple days.  Her speech is difficult to discern at times    Scheduled Meds:enoxaparin, 1 mg/kg, Subcutaneous, Q12H  magic mouthwash oral suspension (mixture) (diphenhydrAMINE HCl - aluminum & magnesium hydroxide-simethicone - lidocaine viscous) solution 55 mL, 5 mL, Swish & Spit, Q6H  metoprolol succinate XL, 12.5 mg, Oral, Q24H  pantoprazole, 40 mg, Oral, Daily  QUEtiapine, 200 mg, Oral, BID      Continuous Infusions:Pharmacy to Dose enoxaparin (LOVENOX),         Vital signs in last 24 hours:  Temp:  [97.3 °F (36.3 °C)-97.9 °F (36.6 °C)] 97.3 °F (36.3 °C)  Heart Rate:  [113-130] 113  Resp:  [16-18] 18  BP: (107-129)/(67-87) 129/83    Intake/Output:    Intake/Output Summary (Last 24 hours) at 2024 1043  Last data filed at 2024 0523  Gross per 24 hour   Intake --   Output 100 ml   Net -100 ml       Exam:  /83 (BP Location: Left arm, Patient Position: Lying)   Pulse 113   Temp 97.3 °F (36.3 °C) (Oral)   Resp 18   Ht 157.5 cm (62\")   Wt 35.8 kg (78 lb 14.8 oz)   SpO2 (!) 88%   BMI 14.44 kg/m²     General Appearance:    Alert, cooperative, failing to thrive                         Throat: Less dry OP and her swallow is extremely weak                           Neck:   Supple, symmetrical, trachea midline, no JVD                         Lungs:    Coarse rhonchi bilaterally to auscultation bilaterally with concern for referred upper airway breath sounds, respirations unlabored -mild use of accessory " muscles at times                 Chest Wall:    No tenderness or deformity                          Heart:  Tachycardic rate and rhythm, S1 and S2 normal                  Abdomen:     Soft, nontender, bowel sounds active                 Extremities: GW though moving all                        Pulses:   Pulses palpable in all extremities                            Data Review:  Labs in chart were reviewed.    Assessment:  Active Hospital Problems    Diagnosis  POA    **Mycoplasma pneumonia [J15.7]  Unknown    Severe protein-calorie malnutrition [E43]  Yes    Acute and chronic respiratory failure with hypoxia [J96.21]  Unknown    LFTs abnormal [R79.89]  Unknown    Sepsis due to pneumonia [J18.9, A41.9]  Unknown    Anemia, chronic disease [D63.8]  Unknown    Schizophrenia [F20.9]  Unknown    Pulmonary embolism [I26.99]  Yes      Resolved Hospital Problems   No resolved problems to display.       Plan:    Multifactorial acute hypoxic respiratory failure   -Mycoplasma treated with azithromycin   -Concern for significant dysphagia/aspiration.  Chest x-ray worsened and either due to aspiration versus CHF as this is a new diagnosis with an EF of 30 to 35% noted on echo.  Status post IVF per cardiology.  Diuretics have been on hold per the recommendations   -Discontinue Rocephin and switch to Zosyn for better anaerobic coverage.  Leukocytosis resolved prior to switching antibiotics   -I modified diet nectar thick based on what I can see at bedside.  Speech following   -Pulmonary embolism on Lovenox      Reactive LFTs with normal bilirubin    ACD/thrombocytosis stable -Hgb actually improved 9.2-9.5    Hypokalemia replaced    GERD-PPI    Schizophrenia-Seroquel    Subcarinal mass evaluated per TTS -no intervention at this time with PET as outpatient noted        Disposition -I am quite concerned that this patient is not making much clinical improvement.  I think she was a very disheveled new patient and at this point I am  worried about failure to thrive.  I have not met any family or guardians at bedside.  Palliative consult has been placed to get their input.  Case discussed at length with managing RN at bedside    González Esparza MD  12/14/2024  10:43 EST

## 2024-12-14 NOTE — PROGRESS NOTES
"    Patient Name: Alanna Machuca  :1950  74 y.o.      Patient Care Team:  Provider, No Known as PCP - General    Chief Complaint: PNA CHF    Interval History: Hard to understand volume status .was very dry a few days ago, now CXR looks worse, though BNP downtrending and WBC improved.        Objective   Vital Signs  Temp:  [97.3 °F (36.3 °C)-97.9 °F (36.6 °C)] 97.3 °F (36.3 °C)  Heart Rate:  [113-130] 113  Resp:  [16-18] 18  BP: (107-129)/(67-87) 129/83    Intake/Output Summary (Last 24 hours) at 2024 1221  Last data filed at 2024 0523  Gross per 24 hour   Intake --   Output 100 ml   Net -100 ml     Flowsheet Rows      Flowsheet Row First Filed Value   Admission Height 157.5 cm (62\") Documented at 12/10/2024 2030   Admission Weight 52.2 kg (115 lb) Documented at 12/10/2024 2030            Physical Exam:   General Appearance:    Alert, cooperative, in no acute distress   Lungs:     Wheezes ronchi no rales.      Heart:    Regular rhythm and normal rate, normal S1 and S2, no murmurs, gallops or rubs.     Chest Wall:    No abnormalities observed   Abdomen:     Soft, nontender, positive bowel sounds.     Extremities:   no cyanosis, clubbing or edema.  No marked joint deformities.  Adequate musculoskeletal strength.       Results Review:    Results from last 7 days   Lab Units 24  0834   SODIUM mmol/L 143   POTASSIUM mmol/L 4.0   CHLORIDE mmol/L 106   CO2 mmol/L 28.0   BUN mg/dL 14   CREATININE mg/dL 0.67   GLUCOSE mg/dL 108*   CALCIUM mg/dL 7.8*     Results from last 7 days   Lab Units 24  0646 12/10/24  1817 12/10/24  1714   HSTROP T ng/L 52* 49* 48*     Results from last 7 days   Lab Units 24  0511   WBC 10*3/mm3 10.72   HEMOGLOBIN g/dL 9.5*   HEMATOCRIT % 27.9*   PLATELETS 10*3/mm3 514*         Results from last 7 days   Lab Units 12/10/24  1714   MAGNESIUM mg/dL 2.3                   Medication Review:   enoxaparin, 1 mg/kg, Subcutaneous, Q12H  furosemide, 40 mg, Intravenous, " Q8H  magic mouthwash oral suspension (mixture) (diphenhydrAMINE HCl - aluminum & magnesium hydroxide-simethicone - lidocaine viscous) solution 55 mL, 5 mL, Swish & Spit, Q6H  metoprolol succinate XL, 12.5 mg, Oral, Q24H  pantoprazole, 40 mg, Oral, Daily  piperacillin-tazobactam, 3.375 g, Intravenous, Once  piperacillin-tazobactam, 3.375 g, Intravenous, Q8H  QUEtiapine, 200 mg, Oral, BID         Pharmacy to Dose enoxaparin (LOVENOX),   Pharmacy to Dose Zosyn,         Assessment & Plan   Mycoplasma pneumonia  Systolic cardiomyopathy not acute CHF  Sinus tachycardia    Will switch gears given minimal improvement on fluids. Will try diuresis given worsen CXR. Lasix 40 IV q8h    Opal Farrell MD  [facility]  12/14/24  12:21 EST

## 2024-12-14 NOTE — PROGRESS NOTES
[facility] Clinical [facility]acy Services: Piperacillin-Tazobactam Consult    Pt Name: Alanna Machuca   : 1950    Indication: Pneumonia    Relevant clinical data and objective history reviewed:    History reviewed. No pertinent past medical history.  Creatinine   Date Value Ref Range Status   2024 0.67 0.57 - 1.00 mg/dL Final   2024 0.76 0.57 - 1.00 mg/dL Final   2024 0.79 0.57 - 1.00 mg/dL Final   2023 0.81 0.55 - 1.02 mg/dL Final   2022 0.71 0.55 - 1.02 mg/dL Final   2019 0.8 0.7 - 1.5 mg/dL Final     BUN   Date Value Ref Range Status   2024 14 8 - 23 mg/dL Final   2023 19 10 - 20 mg/dL Final     Estimated Creatinine Clearance: 41 mL/min (by C-G formula based on SCr of 0.67 mg/dL).    Lab Results   Component Value Date    WBC 10.72 2024     Temp Readings from Last 3 Encounters:   24 97.3 °F (36.3 °C) (Oral)      Assessment/Plan  Estimated CrCl >20 mL/min at this time; BMI 14.44 kg/m2  Will start piperacillin-tazobactam 3.375 g IV every 8 hours     Pharmacy will continue to follow daily while on piperacillin-tazobactam and adjust as needed. Thank you for this consult.    Wally Ramos [facility]  Clinical Pharmacist

## 2024-12-14 NOTE — PLAN OF CARE
Goal Outcome Evaluation:  VSS, RA-2L NC. Patient began coughing and asking for oxygen this morning after trying a few bites of breakfast. Liquids changed to nectar thick. Patient was placed on 2L and has been more lethargic as the day as progressed. IV abx changed to zosyn. Palliative consult placed for goal of care discussion and legal guardian updated on patient condition. Guardian requested palliative RN to meet with care giver and patient at 1500 tomorrow. CTM     Subjective:      Jennifer Lopez Arellano is a 34 y.o. female who presents with Follow-Up (Labs)            1. Need for vaccination    - Influenza Vaccine Quad Injection (PF)    2. Seizure disorder (HCC)  The patient's current medical issue is well controlled on the current therapy with no new symptoms or worsening      3. Depression, major, recurrent, moderate (HCC)  Doing well on current meds  The patient's current medical issue is well controlled on the current therapy with no new symptoms or worsening      Past Medical History:  1/30/2013: Anemia  1/19/2019: Anxiety  9/26/2018: Depression, major, recurrent, moderate (HCC)  No date: Epilepsy associated with specific stimuli (HCC)  No date: Head ache  No date: Seizure (HCC)  No past surgical history on file.  Social History    Tobacco Use      Smoking status: Never Smoker      Smokeless tobacco: Never Used    Alcohol use: No    Drug use: No    Review of patient's family history indicates:  Problem: Hypertension      Relation: Father          Age of Onset: (Not Specified)      Current Outpatient Medications: •  norethindrone-ethinyl estradiol (NORINYL 1+35, 28,) 1-35 MG-MCG per tablet, Take 1 Tab by mouth every day., Disp: 28 Tab, Rfl: 11•  perampanel (FYCOMPA) 2 MG Tab tablet, Take 0.5 Tabs by mouth every bedtime for 180 days., Disp: 15 Tab, Rfl: 5•  escitalopram (LEXAPRO) 10 MG Tab, Take 1 Tab by mouth every day., Disp: 30 Tab, Rfl: 5•  lacosamide (VIMPAT) 200 MG Tab tablet, Take 200 mg by mouth 2 Times a Day for 182 days., Disp: 60 Tab, Rfl: 5•  Brivaracetam (BRIVIACT) 75 MG Tab, Take 150 mg by mouth 2 Times a Day for 180 days., Disp: 120 Tab, Rfl: 5•  folic acid (FOLVITE) 1 MG Tab, Take 2 Tabs by mouth every day., Disp: 60 Tab, Rfl: 11•  acetaminophen (TYLENOL) 325 MG Tab, Take 2 Tabs by mouth every 6 hours as needed (Mild Pain; (Pain scale 1-3); Temp greater than 100.5 F). (Patient not taking: Reported on 3/22/2021), Disp: 30 Tab, Rfl: 0    Patient  "was instructed on the use of medications, either prescriptions or OTC and informed on when the appropriate follow up time period should be. In addition, patient was also instructed that should any acute worsening occur that they should notify this clinic asap or call 911.          Review of Systems   Constitutional: Negative.  Negative for chills and fever.   HENT: Negative.  Negative for hearing loss.    Eyes: Negative.  Negative for blurred vision and double vision.   Respiratory: Negative.  Negative for cough and hemoptysis.    Cardiovascular: Negative.  Negative for chest pain and palpitations.   Gastrointestinal: Negative.  Negative for heartburn and nausea.   Genitourinary: Negative.  Negative for dysuria.   Musculoskeletal: Negative.  Negative for myalgias.   Skin: Negative.  Negative for rash.   Neurological: Negative.  Negative for dizziness, tingling and headaches.   Endo/Heme/Allergies: Negative.  Does not bruise/bleed easily.   Psychiatric/Behavioral: Negative.  Negative for depression and suicidal ideas.   All other systems reviewed and are negative.         Objective:     /68   Pulse 70   Temp 36.9 °C (98.5 °F) (Temporal)   Resp 20   Ht 1.626 m (5' 4\")   Wt 53.6 kg (118 lb 1.6 oz)   SpO2 96%   BMI 20.27 kg/m²      Physical Exam  Vitals and nursing note reviewed.   Constitutional:       General: She is not in acute distress.     Appearance: She is well-developed. She is not diaphoretic.   HENT:      Head: Normocephalic and atraumatic.      Mouth/Throat:      Pharynx: No oropharyngeal exudate.   Eyes:      Pupils: Pupils are equal, round, and reactive to light.   Cardiovascular:      Rate and Rhythm: Normal rate and regular rhythm.      Heart sounds: Normal heart sounds. No murmur. No friction rub. No gallop.    Pulmonary:      Effort: Pulmonary effort is normal. No respiratory distress.      Breath sounds: Normal breath sounds. No wheezing or rales.   Chest:      Chest wall: No tenderness. "   Neurological:      Mental Status: She is alert and oriented to person, place, and time.   Psychiatric:         Behavior: Behavior normal.         Thought Content: Thought content normal.         Judgment: Judgment normal.                 Assessment/Plan:        1. Need for vaccination    - Influenza Vaccine Quad Injection (PF)    2. Seizure disorder (HCC)      3. Depression, major, recurrent, moderate (HCC)

## 2024-12-15 LAB
CREAT SERPL-MCNC: 0.92 MG/DL (ref 0.57–1)
EGFRCR SERPLBLD CKD-EPI 2021: 65.1 ML/MIN/1.73
QT INTERVAL: 301 MS
QTC INTERVAL: 463 MS

## 2024-12-15 PROCEDURE — 25010000002 PIPERACILLIN SOD-TAZOBACTAM PER 1 G: Performed by: HOSPITALIST

## 2024-12-15 PROCEDURE — 99232 SBSQ HOSP IP/OBS MODERATE 35: CPT | Performed by: INTERNAL MEDICINE

## 2024-12-15 PROCEDURE — 25010000002 ONDANSETRON PER 1 MG

## 2024-12-15 PROCEDURE — 82565 ASSAY OF CREATININE: CPT | Performed by: HOSPITALIST

## 2024-12-15 PROCEDURE — 93005 ELECTROCARDIOGRAM TRACING: CPT | Performed by: HOSPITALIST

## 2024-12-15 PROCEDURE — 25810000003 SODIUM CHLORIDE 0.9 % SOLUTION: Performed by: HOSPITALIST

## 2024-12-15 PROCEDURE — 25010000002 ENOXAPARIN PER 10 MG

## 2024-12-15 PROCEDURE — 93010 ELECTROCARDIOGRAM REPORT: CPT | Performed by: INTERNAL MEDICINE

## 2024-12-15 PROCEDURE — 25010000002 FUROSEMIDE PER 20 MG: Performed by: INTERNAL MEDICINE

## 2024-12-15 RX ORDER — METOPROLOL SUCCINATE 25 MG/1
25 TABLET, EXTENDED RELEASE ORAL
Status: DISCONTINUED | OUTPATIENT
Start: 2024-12-15 | End: 2024-12-16

## 2024-12-15 RX ADMIN — ONDANSETRON 4 MG: 2 INJECTION, SOLUTION INTRAMUSCULAR; INTRAVENOUS at 02:44

## 2024-12-15 RX ADMIN — QUETIAPINE FUMARATE 200 MG: 50 TABLET ORAL at 22:00

## 2024-12-15 RX ADMIN — ENOXAPARIN SODIUM 40 MG: 100 INJECTION SUBCUTANEOUS at 08:50

## 2024-12-15 RX ADMIN — ENOXAPARIN SODIUM 40 MG: 100 INJECTION SUBCUTANEOUS at 21:59

## 2024-12-15 RX ADMIN — QUETIAPINE FUMARATE 200 MG: 50 TABLET ORAL at 08:51

## 2024-12-15 RX ADMIN — PIPERACILLIN AND TAZOBACTAM 3.38 G: 3; .375 INJECTION, POWDER, FOR SOLUTION INTRAVENOUS at 18:04

## 2024-12-15 RX ADMIN — METOPROLOL SUCCINATE 25 MG: 25 TABLET, EXTENDED RELEASE ORAL at 08:51

## 2024-12-15 RX ADMIN — PIPERACILLIN AND TAZOBACTAM 3.38 G: 3; .375 INJECTION, POWDER, FOR SOLUTION INTRAVENOUS at 08:51

## 2024-12-15 RX ADMIN — PIPERACILLIN AND TAZOBACTAM 3.38 G: 3; .375 INJECTION, POWDER, FOR SOLUTION INTRAVENOUS at 02:29

## 2024-12-15 RX ADMIN — FUROSEMIDE 40 MG: 10 INJECTION, SOLUTION INTRAMUSCULAR; INTRAVENOUS at 06:43

## 2024-12-15 RX ADMIN — SODIUM CHLORIDE 250 ML: 9 INJECTION, SOLUTION INTRAVENOUS at 13:11

## 2024-12-15 RX ADMIN — ONDANSETRON 4 MG: 2 INJECTION, SOLUTION INTRAMUSCULAR; INTRAVENOUS at 22:00

## 2024-12-15 RX ADMIN — PANTOPRAZOLE SODIUM 40 MG: 40 TABLET, DELAYED RELEASE ORAL at 08:51

## 2024-12-15 NOTE — PLAN OF CARE
Goal Outcome Evaluation:  Plan of Care Reviewed With: patient           Outcome Evaluation: Pt HR got up to the 150s last night, STAT EKG ordered, verified sinus tach. Pt asymptomatic to 150 jump. Cardiology aware of sinus tachycardia. Pt HR jumps to 130s-140s sinus tach, and then drops back down to 110s-120s. Pt ate one cup of applesauce last night. A&O3, d/o to time. 2L NC, purwick in place. Safety and isolation precautions maintained. Bed alarm on. Plan of care ongoing.       Problem: Adult Inpatient Plan of Care  Goal: Plan of Care Review  Outcome: Progressing  Flowsheets (Taken 12/15/2024 0532)  Outcome Evaluation: Pt HR got up to the 150s last night, STAT EKG ordered, verified sinus tach. Pt asymptomatic to 150 jump. Cardiology aware of sinus tachycardia. Pt HR sustaining 130s-140s sinus tach, and then drops back down to 110s-120s. Pt ate one cup of applesauce last night. A&O3, d/o to time. 2L NC, purwick in place. Safety and isolation precautions maintained. Bed alarm on. Plan of care ongoing.  Plan of Care Reviewed With: patient  Goal: Patient-Specific Goal (Individualized)  Outcome: Progressing  Goal: Absence of Hospital-Acquired Illness or Injury  Outcome: Progressing  Intervention: Identify and Manage Fall Risk  Recent Flowsheet Documentation  Taken 12/15/2024 0410 by Linda Tate, RN  Safety Promotion/Fall Prevention:   assistive device/personal items within reach   activity supervised   clutter free environment maintained   fall prevention program maintained   lighting adjusted   nonskid shoes/slippers when out of bed   room organization consistent   safety round/check completed  Taken 12/15/2024 0250 by Linda Tate, RN  Safety Promotion/Fall Prevention:   assistive device/personal items within reach   activity supervised   clutter free environment maintained   fall prevention program maintained   lighting adjusted   nonskid shoes/slippers when out of bed   room organization consistent    safety round/check completed  Taken 12/15/2024 0009 by Linda Tate RN  Safety Promotion/Fall Prevention:   assistive device/personal items within reach   activity supervised   clutter free environment maintained   fall prevention program maintained   lighting adjusted   nonskid shoes/slippers when out of bed   room organization consistent   safety round/check completed  Taken 12/14/2024 2258 by Linda Tate RN  Safety Promotion/Fall Prevention:   assistive device/personal items within reach   activity supervised   clutter free environment maintained   fall prevention program maintained   lighting adjusted   nonskid shoes/slippers when out of bed   room organization consistent   safety round/check completed  Taken 12/14/2024 2045 by Linda Tate RN  Safety Promotion/Fall Prevention:   assistive device/personal items within reach   activity supervised   clutter free environment maintained   fall prevention program maintained   lighting adjusted   nonskid shoes/slippers when out of bed   room organization consistent   safety round/check completed  Intervention: Prevent and Manage VTE (Venous Thromboembolism) Risk  Recent Flowsheet Documentation  Taken 12/14/2024 2045 by Linda Tate RN  VTE Prevention/Management: (lovenox) other (see comments)  Goal: Optimal Comfort and Wellbeing  Outcome: Progressing  Intervention: Provide Person-Centered Care  Recent Flowsheet Documentation  Taken 12/15/2024 0009 by Linda Tate RN  Trust Relationship/Rapport:   care explained   choices provided   emotional support provided   empathic listening provided  Taken 12/14/2024 2045 by Linda Tate RN  Trust Relationship/Rapport:   care explained   choices provided   emotional support provided   empathic listening provided  Goal: Readiness for Transition of Care  Outcome: Progressing     Problem: Violence Risk or Actual  Goal: Anger and Impulse Control  Outcome: Progressing  Intervention: Minimize Safety  Risk  Recent Flowsheet Documentation  Taken 12/15/2024 0410 by Linda Tate RN  Enhanced Safety Measures: bed alarm set  Taken 12/15/2024 0250 by Linda Tate RN  Enhanced Safety Measures: bed alarm set  Taken 12/15/2024 0009 by Linda Tate RN  Enhanced Safety Measures: bed alarm set  Taken 12/14/2024 2258 by Linda Tate RN  Enhanced Safety Measures: bed alarm set  Taken 12/14/2024 2045 by Linda Tate RN  Enhanced Safety Measures: bed alarm set     Problem: Sepsis/Septic Shock  Goal: Optimal Coping  Outcome: Progressing  Goal: Absence of Bleeding  Outcome: Progressing  Goal: Blood Glucose Level Within Target Range  Outcome: Progressing  Goal: Absence of Infection Signs and Symptoms  Outcome: Progressing  Intervention: Initiate Sepsis Management  Recent Flowsheet Documentation  Taken 12/15/2024 0410 by Linda Tate RN  Isolation Precautions:   precautions maintained   droplet  Taken 12/15/2024 0250 by Linda Tate RN  Isolation Precautions:   precautions maintained   droplet  Taken 12/15/2024 0009 by Linda Tate RN  Isolation Precautions:   precautions maintained   droplet  Taken 12/14/2024 2258 by Linda Tate RN  Isolation Precautions: precautions maintained  Taken 12/14/2024 2045 by Linda Tate RN  Isolation Precautions:   precautions maintained   droplet  Goal: Optimal Nutrition Delivery  Outcome: Progressing     Problem: Skin Injury Risk Increased  Goal: Skin Health and Integrity  Outcome: Progressing     Problem: Fall Injury Risk  Goal: Absence of Fall and Fall-Related Injury  Outcome: Progressing  Intervention: Identify and Manage Contributors  Recent Flowsheet Documentation  Taken 12/15/2024 0410 by Linda Tate RN  Medication Review/Management: medications reviewed  Taken 12/15/2024 0250 by Linda Tate RN  Medication Review/Management: medications reviewed  Taken 12/15/2024 0009 by Linda Tate RN  Medication  Review/Management: medications reviewed  Taken 12/14/2024 2258 by Linda Tate, RN  Medication Review/Management: medications reviewed  Taken 12/14/2024 2045 by Linda Tate RN  Medication Review/Management: medications reviewed  Intervention: Promote Injury-Free Environment  Recent Flowsheet Documentation  Taken 12/15/2024 0410 by Linda Tate, RN  Safety Promotion/Fall Prevention:   assistive device/personal items within reach   activity supervised   clutter free environment maintained   fall prevention program maintained   lighting adjusted   nonskid shoes/slippers when out of bed   room organization consistent   safety round/check completed  Taken 12/15/2024 0250 by Linda Tate RN  Safety Promotion/Fall Prevention:   assistive device/personal items within reach   activity supervised   clutter free environment maintained   fall prevention program maintained   lighting adjusted   nonskid shoes/slippers when out of bed   room organization consistent   safety round/check completed  Taken 12/15/2024 0009 by Linda Tate RN  Safety Promotion/Fall Prevention:   assistive device/personal items within reach   activity supervised   clutter free environment maintained   fall prevention program maintained   lighting adjusted   nonskid shoes/slippers when out of bed   room organization consistent   safety round/check completed  Taken 12/14/2024 2258 by Linda Tate RN  Safety Promotion/Fall Prevention:   assistive device/personal items within reach   activity supervised   clutter free environment maintained   fall prevention program maintained   lighting adjusted   nonskid shoes/slippers when out of bed   room organization consistent   safety round/check completed  Taken 12/14/2024 2045 by Linda Tate, RN  Safety Promotion/Fall Prevention:   assistive device/personal items within reach   activity supervised   clutter free environment maintained   fall prevention program maintained    lighting adjusted   nonskid shoes/slippers when out of bed   room organization consistent   safety round/check completed     Problem: Malnutrition  Goal: Improved Nutritional Intake  Outcome: Progressing

## 2024-12-15 NOTE — PROGRESS NOTES
"    Patient Name: Alanna Machuca  :1950  74 y.o.      Patient Care Team:  Provider, No Known as PCP - General    Chief Complaint: PNA CHF    Interval History: Given her lack of improvement I decided to diurese her yesterday.  She has put out  2.6 L, creatinine remained stable.  However she is increasingly tachycardic and not improved in terms of breathing.  She is also hypotensive this morning.  Objective   Vital Signs  Temp:  [97 °F (36.1 °C)-97.7 °F (36.5 °C)] 97 °F (36.1 °C)  Heart Rate:  [114-138] 114  Resp:  [16-18] 18  BP: ()/(67-81) 89/69    Intake/Output Summary (Last 24 hours) at 12/15/2024 1239  Last data filed at 12/15/2024 0429  Gross per 24 hour   Intake --   Output 2600 ml   Net -2600 ml     Flowsheet Rows      Flowsheet Row First Filed Value   Admission Height 157.5 cm (62\") Documented at 12/10/2024 2030   Admission Weight 52.2 kg (115 lb) Documented at 12/10/2024 2030            Physical Exam:   General Appearance:    Alert, cooperative, in no acute distress   Lungs:     Wheezes ronchi no rales.      Heart:    Regular rhythm and normal rate, normal S1 and S2, no murmurs, gallops or rubs.     Chest Wall:    No abnormalities observed   Abdomen:     Soft, nontender, positive bowel sounds.     Extremities:   no cyanosis, clubbing or edema.  No marked joint deformities.  Adequate musculoskeletal strength.       Results Review:    Results from last 7 days   Lab Units 12/15/24  0907 24  0834   SODIUM mmol/L  --  143   POTASSIUM mmol/L  --  4.0   CHLORIDE mmol/L  --  106   CO2 mmol/L  --  28.0   BUN mg/dL  --  14   CREATININE mg/dL 0.92 0.67   GLUCOSE mg/dL  --  108*   CALCIUM mg/dL  --  7.8*     Results from last 7 days   Lab Units 24  0646 12/10/24  1817 12/10/24  1714   HSTROP T ng/L 52* 49* 48*     Results from last 7 days   Lab Units 24  0511   WBC 10*3/mm3 10.72   HEMOGLOBIN g/dL 9.5*   HEMATOCRIT % 27.9*   PLATELETS 10*3/mm3 514*         Results from last 7 days   Lab " Units 12/10/24  1714   MAGNESIUM mg/dL 2.3                   Medication Review:   enoxaparin, 1 mg/kg, Subcutaneous, Q12H  furosemide, 40 mg, Intravenous, Q8H  magic mouthwash oral suspension (mixture) (diphenhydrAMINE HCl - aluminum & magnesium hydroxide-simethicone - lidocaine viscous) solution 55 mL, 5 mL, Swish & Spit, Q6H  metoprolol succinate XL, 25 mg, Oral, Q24H  pantoprazole, 40 mg, Oral, Daily  piperacillin-tazobactam, 3.375 g, Intravenous, Q8H  QUEtiapine, 200 mg, Oral, BID         Pharmacy to Dose enoxaparin (LOVENOX),   Pharmacy to Dose Zosyn,         Assessment & Plan   Mycoplasma pneumonia  Systolic cardiomyopathy not acute CHF  Sinus tachycardia    Diuresed well with IV Lasix however now tachycardic and hypotensive.  Will hold off for now.  No further fluids.  Discussed with Dr. Esparza prognosis appears very poor given her frailty    Opal Farrell MD  [facility]  12/15/24  12:39 EST

## 2024-12-15 NOTE — PLAN OF CARE
"Goal Outcome Evaluation:  Orientated to self only, Pt still showing signs of becoming \"air hungry\" after eating and/or drinking any liquid. Required NRB to recover today after eating 4 bites mashed potatoes. Discussed the situation with Guardian and patient. Palliative to see patient. Patient given 250ml bolus for hypotension after diuretic and BB. Pt exhibiting same lethargy post attempting to eat as yesterday.      "

## 2024-12-15 NOTE — PROGRESS NOTES
"    DAILY PROGRESS NOTE  [facility]    Patient Identification:  Name: Alanna Machuca  Age: 74 y.o.  Sex: female  :  1950  MRN: [id number]         Primary Care Physician: Provider, No Known    Subjective:  Interval History: History and ROS very difficult to obtain from the patient.  She is frail scared and confused still.  Upon entering the room she was actually resting comfortably though she is not resting much overnight per discussion with staff.  She was laying nearly supine and I cannot appreciate any active respiratory distress which is reassuring given concerns for volume overload noted on recent chest x-ray compounded by aspiration pneumonitis    Objective: No family present.  Very elderly and frail and simply not thriving    Scheduled Meds:enoxaparin, 1 mg/kg, Subcutaneous, Q12H  furosemide, 40 mg, Intravenous, Q8H  magic mouthwash oral suspension (mixture) (diphenhydrAMINE HCl - aluminum & magnesium hydroxide-simethicone - lidocaine viscous) solution 55 mL, 5 mL, Swish & Spit, Q6H  metoprolol succinate XL, 25 mg, Oral, Q24H  pantoprazole, 40 mg, Oral, Daily  piperacillin-tazobactam, 3.375 g, Intravenous, Q8H  QUEtiapine, 200 mg, Oral, BID      Continuous Infusions:Pharmacy to Dose enoxaparin (LOVENOX),   Pharmacy to Dose Zosyn,         Vital signs in last 24 hours:  Temp:  [97 °F (36.1 °C)-97.7 °F (36.5 °C)] 97 °F (36.1 °C)  Heart Rate:  [114-138] 114  Resp:  [16-18] 18  BP: ()/(67-81) 89/69    Intake/Output:    Intake/Output Summary (Last 24 hours) at 12/15/2024 1033  Last data filed at 12/15/2024 0429  Gross per 24 hour   Intake --   Output 2600 ml   Net -2600 ml       Exam:  BP (!) 89/69 (BP Location: Left arm, Patient Position: Lying)   Pulse 114   Temp 97 °F (36.1 °C) (Oral)   Resp 18   Ht 157.5 cm (62\")   Wt 36.8 kg (81 lb 2.1 oz)   SpO2 96%   BMI 14.84 kg/m²     General Appearance:    Alert, failure to thrive                          Head:    Normocephalic, without " obvious abnormality, atraumatic                           Eyes:    PERRL, conjunctivae/corneas clear, EOM's intact, both eyes                         Throat: Minimal dentition with mucous membranes remaining dry                           neck:   Supple, no rigidity or JVD                         Lungs:    Much less coarse than the other day to auscultation bilaterally, respirations unlabored laying supine currently                 Chest Wall:    No tenderness or deformity                          Heart:  Tachycardic rate and rhythm, S1 and S2 normal                  Abdomen:     Soft, nontender, bowel sounds active                 Extremities: Lysed weakness though moving all, no pitting edema                        Pulses:   Pulses palpable in all extremities                            Data Review:  Labs in chart were reviewed.    Assessment:  Active Hospital Problems    Diagnosis  POA    **Mycoplasma pneumonia [J15.7]  Unknown    Severe protein-calorie malnutrition [E43]  Yes    Acute and chronic respiratory failure with hypoxia [J96.21]  Unknown    LFTs abnormal [R79.89]  Unknown    Sepsis due to pneumonia [J18.9, A41.9]  Unknown    Anemia, chronic disease [D63.8]  Unknown    Schizophrenia [F20.9]  Unknown    Pulmonary embolism [I26.99]  Yes      Resolved Hospital Problems   No resolved problems to display.       Plan:    Mycoplasma treated with azithromycin with antibiotics converted to Zosyn secondary to concern for aspiration pneumonia compounded by acute/new diagnosis CHF with an EF of 30 to 35% and wall motion abnormalities   -Discussed with Dr. Farrell regarding diuresis and likely will hold further   -I feel her tachycardia is multifactorial and strongly influenced by current hospitalization as well as reactive from schizophrenia.  I am not sure what more can be done given blood pressures   -Modified diet to nectar thick liquids   -Pulmonary embolism on Lovenox   -ACD stable and tolerating Lovenox with Hgb  improved to 9.5    Disposition-no family has been seen at bedside and the patient has a caretaker and a guardian.  RN was able to reach the guardian the other day informing them of how this patient is not thriving.  I am worried about overall prognosis.  Palliative care has been consulted to help coordinate further care and would be strongly endorse from my perspective      Addendum -O2 escalated to nonrebreather though was doing 90+ percent on nasal cannula.  Concerns about further aspiration when taking p.o.  Also concerns of perioral cyanosis and patient's systolic blood pressure in the 70s.  I think she needed the IV diuresis but I think she is a little bit on the drier side at this point probably from an intravascular component will give a very gentle 250 cc bolus now.  I discussed case earlier with  earlier in diuretics had been held for now.  Again my prognosis is poor for this patient.  I am hopeful she stabilizes but otherwise I am worried about transition to ICU versus palliative care as we are awaiting palliative care to try to coordinate a meeting with guardian who I have not met to this point    González Esparza MD  12/15/2024  10:33 EST\

## 2024-12-16 PROBLEM — J69.0 ASPIRATION PNEUMONIA DUE TO VOMITUS: Status: ACTIVE | Noted: 2024-12-16

## 2024-12-16 LAB
ALBUMIN SERPL-MCNC: 2.6 G/DL (ref 3.5–5.2)
ALBUMIN/GLOB SERPL: 0.5 G/DL
ALP SERPL-CCNC: 149 U/L (ref 39–117)
ALT SERPL W P-5'-P-CCNC: 32 U/L (ref 1–33)
ANION GAP SERPL CALCULATED.3IONS-SCNC: 15.3 MMOL/L (ref 5–15)
AST SERPL-CCNC: 57 U/L (ref 1–32)
BACTERIA SPEC AEROBE CULT: NORMAL
BACTERIA SPEC AEROBE CULT: NORMAL
BILIRUB SERPL-MCNC: 0.3 MG/DL (ref 0–1.2)
BUN SERPL-MCNC: 14 MG/DL (ref 8–23)
BUN/CREAT SERPL: 13.6 (ref 7–25)
CALCIUM SPEC-SCNC: 7.6 MG/DL (ref 8.6–10.5)
CHLORIDE SERPL-SCNC: 101 MMOL/L (ref 98–107)
CO2 SERPL-SCNC: 27.7 MMOL/L (ref 22–29)
CREAT SERPL-MCNC: 1.03 MG/DL (ref 0.57–1)
DEPRECATED RDW RBC AUTO: 43.3 FL (ref 37–54)
EGFRCR SERPLBLD CKD-EPI 2021: 56.8 ML/MIN/1.73
ERYTHROCYTE [DISTWIDTH] IN BLOOD BY AUTOMATED COUNT: 14.5 % (ref 12.3–15.4)
GLOBULIN UR ELPH-MCNC: 4.9 GM/DL
GLUCOSE SERPL-MCNC: 103 MG/DL (ref 65–99)
HCT VFR BLD AUTO: 29.2 % (ref 34–46.6)
HGB BLD-MCNC: 9.9 G/DL (ref 12–15.9)
MCH RBC QN AUTO: 28.3 PG (ref 26.6–33)
MCHC RBC AUTO-ENTMCNC: 33.9 G/DL (ref 31.5–35.7)
MCV RBC AUTO: 83.4 FL (ref 79–97)
PLATELET # BLD AUTO: 559 10*3/MM3 (ref 140–450)
PMV BLD AUTO: 10.8 FL (ref 6–12)
POTASSIUM SERPL-SCNC: 3.3 MMOL/L (ref 3.5–5.2)
PROT SERPL-MCNC: 7.5 G/DL (ref 6–8.5)
RBC # BLD AUTO: 3.5 10*6/MM3 (ref 3.77–5.28)
SODIUM SERPL-SCNC: 144 MMOL/L (ref 136–145)
URATE SERPL-MCNC: 3.9 MG/DL (ref 2.4–5.7)
WBC NRBC COR # BLD AUTO: 10.58 10*3/MM3 (ref 3.4–10.8)
WHOLE BLOOD HOLD SPECIMEN: NORMAL

## 2024-12-16 PROCEDURE — 97530 THERAPEUTIC ACTIVITIES: CPT

## 2024-12-16 PROCEDURE — 80053 COMPREHEN METABOLIC PANEL: CPT | Performed by: HOSPITALIST

## 2024-12-16 PROCEDURE — 25010000002 PIPERACILLIN SOD-TAZOBACTAM PER 1 G: Performed by: HOSPITALIST

## 2024-12-16 PROCEDURE — 99232 SBSQ HOSP IP/OBS MODERATE 35: CPT | Performed by: NURSE PRACTITIONER

## 2024-12-16 PROCEDURE — 84550 ASSAY OF BLOOD/URIC ACID: CPT | Performed by: HOSPITALIST

## 2024-12-16 PROCEDURE — 85027 COMPLETE CBC AUTOMATED: CPT | Performed by: HOSPITALIST

## 2024-12-16 RX ORDER — LIDOCAINE HYDROCHLORIDE 20 MG/ML
5 SOLUTION OROPHARYNGEAL EVERY 4 HOURS PRN
Status: DISCONTINUED | OUTPATIENT
Start: 2024-12-16 | End: 2024-12-20 | Stop reason: HOSPADM

## 2024-12-16 RX ORDER — POTASSIUM CHLORIDE 750 MG/1
40 TABLET, FILM COATED, EXTENDED RELEASE ORAL ONCE
Status: DISCONTINUED | OUTPATIENT
Start: 2024-12-16 | End: 2024-12-16

## 2024-12-16 RX ORDER — MORPHINE SULFATE 2 MG/ML
2 INJECTION, SOLUTION INTRAMUSCULAR; INTRAVENOUS
Status: DISCONTINUED | OUTPATIENT
Start: 2024-12-16 | End: 2024-12-20 | Stop reason: HOSPADM

## 2024-12-16 RX ORDER — LORAZEPAM 2 MG/ML
1 INJECTION INTRAMUSCULAR
Status: DISCONTINUED | OUTPATIENT
Start: 2024-12-16 | End: 2024-12-20 | Stop reason: HOSPADM

## 2024-12-16 RX ORDER — DIPHENOXYLATE HYDROCHLORIDE AND ATROPINE SULFATE 2.5; .025 MG/1; MG/1
1 TABLET ORAL
Status: DISCONTINUED | OUTPATIENT
Start: 2024-12-16 | End: 2024-12-20 | Stop reason: HOSPADM

## 2024-12-16 RX ORDER — ACETAMINOPHEN 650 MG/1
650 SUPPOSITORY RECTAL EVERY 4 HOURS PRN
Status: DISCONTINUED | OUTPATIENT
Start: 2024-12-16 | End: 2024-12-20 | Stop reason: HOSPADM

## 2024-12-16 RX ORDER — GLYCOPYRROLATE 0.2 MG/ML
0.2 INJECTION INTRAMUSCULAR; INTRAVENOUS
Status: DISCONTINUED | OUTPATIENT
Start: 2024-12-16 | End: 2024-12-20 | Stop reason: HOSPADM

## 2024-12-16 RX ORDER — ACETAMINOPHEN 160 MG/5ML
650 SOLUTION ORAL EVERY 4 HOURS PRN
Status: DISCONTINUED | OUTPATIENT
Start: 2024-12-16 | End: 2024-12-20 | Stop reason: HOSPADM

## 2024-12-16 RX ORDER — DIPHENHYDRAMINE HCL 25 MG
25 CAPSULE ORAL EVERY 6 HOURS PRN
Status: DISCONTINUED | OUTPATIENT
Start: 2024-12-16 | End: 2024-12-20 | Stop reason: HOSPADM

## 2024-12-16 RX ORDER — MORPHINE SULFATE 2 MG/ML
4 INJECTION, SOLUTION INTRAMUSCULAR; INTRAVENOUS
Status: DISCONTINUED | OUTPATIENT
Start: 2024-12-16 | End: 2024-12-20 | Stop reason: HOSPADM

## 2024-12-16 RX ORDER — HYDROMORPHONE HYDROCHLORIDE 1 MG/ML
0.5 INJECTION, SOLUTION INTRAMUSCULAR; INTRAVENOUS; SUBCUTANEOUS
Status: DISCONTINUED | OUTPATIENT
Start: 2024-12-16 | End: 2024-12-20 | Stop reason: HOSPADM

## 2024-12-16 RX ORDER — MORPHINE SULFATE 10 MG/ML
6 INJECTION INTRAMUSCULAR; INTRAVENOUS; SUBCUTANEOUS
Status: DISCONTINUED | OUTPATIENT
Start: 2024-12-16 | End: 2024-12-20 | Stop reason: HOSPADM

## 2024-12-16 RX ORDER — PROMETHAZINE HYDROCHLORIDE 12.5 MG/1
12.5 SUPPOSITORY RECTAL EVERY 4 HOURS PRN
Status: DISCONTINUED | OUTPATIENT
Start: 2024-12-16 | End: 2024-12-20 | Stop reason: HOSPADM

## 2024-12-16 RX ORDER — LORAZEPAM 2 MG/ML
1 CONCENTRATE ORAL
Status: DISCONTINUED | OUTPATIENT
Start: 2024-12-16 | End: 2024-12-20 | Stop reason: HOSPADM

## 2024-12-16 RX ORDER — LORAZEPAM 2 MG/ML
0.5 CONCENTRATE ORAL
Status: DISCONTINUED | OUTPATIENT
Start: 2024-12-16 | End: 2024-12-20 | Stop reason: HOSPADM

## 2024-12-16 RX ORDER — GLYCOPYRROLATE 0.2 MG/ML
0.4 INJECTION INTRAMUSCULAR; INTRAVENOUS
Status: DISCONTINUED | OUTPATIENT
Start: 2024-12-16 | End: 2024-12-20 | Stop reason: HOSPADM

## 2024-12-16 RX ORDER — MORPHINE SULFATE 20 MG/ML
20 SOLUTION ORAL
Status: DISCONTINUED | OUTPATIENT
Start: 2024-12-16 | End: 2024-12-20 | Stop reason: HOSPADM

## 2024-12-16 RX ORDER — LORAZEPAM 2 MG/ML
2 CONCENTRATE ORAL
Status: DISCONTINUED | OUTPATIENT
Start: 2024-12-16 | End: 2024-12-20 | Stop reason: HOSPADM

## 2024-12-16 RX ORDER — LORAZEPAM 2 MG/ML
2 INJECTION INTRAMUSCULAR
Status: DISCONTINUED | OUTPATIENT
Start: 2024-12-16 | End: 2024-12-20 | Stop reason: HOSPADM

## 2024-12-16 RX ORDER — LORAZEPAM 2 MG/ML
0.5 INJECTION INTRAMUSCULAR
Status: DISCONTINUED | OUTPATIENT
Start: 2024-12-16 | End: 2024-12-20 | Stop reason: HOSPADM

## 2024-12-16 RX ORDER — MORPHINE SULFATE 20 MG/ML
10 SOLUTION ORAL
Status: DISCONTINUED | OUTPATIENT
Start: 2024-12-16 | End: 2024-12-20 | Stop reason: HOSPADM

## 2024-12-16 RX ORDER — SCOLOPAMINE TRANSDERMAL SYSTEM 1 MG/1
1 PATCH, EXTENDED RELEASE TRANSDERMAL
Status: DISCONTINUED | OUTPATIENT
Start: 2024-12-16 | End: 2024-12-20 | Stop reason: HOSPADM

## 2024-12-16 RX ORDER — MORPHINE SULFATE 20 MG/ML
5 SOLUTION ORAL
Status: DISCONTINUED | OUTPATIENT
Start: 2024-12-16 | End: 2024-12-20 | Stop reason: HOSPADM

## 2024-12-16 RX ORDER — ACETAMINOPHEN 325 MG/1
650 TABLET ORAL EVERY 4 HOURS PRN
Status: DISCONTINUED | OUTPATIENT
Start: 2024-12-16 | End: 2024-12-20 | Stop reason: HOSPADM

## 2024-12-16 RX ORDER — KETOROLAC TROMETHAMINE 15 MG/ML
15 INJECTION, SOLUTION INTRAMUSCULAR; INTRAVENOUS EVERY 6 HOURS PRN
Status: DISCONTINUED | OUTPATIENT
Start: 2024-12-16 | End: 2024-12-20 | Stop reason: HOSPADM

## 2024-12-16 RX ORDER — PROMETHAZINE HYDROCHLORIDE 25 MG/1
12.5 TABLET ORAL EVERY 4 HOURS PRN
Status: DISCONTINUED | OUTPATIENT
Start: 2024-12-16 | End: 2024-12-20 | Stop reason: HOSPADM

## 2024-12-16 RX ORDER — DIPHENHYDRAMINE HYDROCHLORIDE 50 MG/ML
25 INJECTION INTRAMUSCULAR; INTRAVENOUS EVERY 6 HOURS PRN
Status: DISCONTINUED | OUTPATIENT
Start: 2024-12-16 | End: 2024-12-20 | Stop reason: HOSPADM

## 2024-12-16 RX ADMIN — PIPERACILLIN AND TAZOBACTAM 3.38 G: 3; .375 INJECTION, POWDER, FOR SOLUTION INTRAVENOUS at 03:05

## 2024-12-16 RX ADMIN — QUETIAPINE FUMARATE 200 MG: 50 TABLET ORAL at 10:21

## 2024-12-16 RX ADMIN — QUETIAPINE FUMARATE 200 MG: 50 TABLET ORAL at 21:02

## 2024-12-16 RX ADMIN — METOPROLOL SUCCINATE 25 MG: 25 TABLET, EXTENDED RELEASE ORAL at 10:19

## 2024-12-16 RX ADMIN — PANTOPRAZOLE SODIUM 40 MG: 40 TABLET, DELAYED RELEASE ORAL at 10:20

## 2024-12-16 NOTE — PLAN OF CARE
Goal Outcome Evaluation:         Pt resting and originally declined PT adamantly but then agreed to eob. Sup to sit with min asst. She was able to sit eob with cga/sba for several minutes. Scooting toward HOB with desat noted to 88% briefly.. Pt returned to supine and declined further activity. HR in 120's today during sitting eob.           Anticipated Discharge Disposition (PT): home with assist, home with home health, skilled nursing facility (TBD)

## 2024-12-16 NOTE — PROGRESS NOTES
Hospital Follow Up    LOS:  LOS: 6 days   Patient Name: Alanna Machuca  Age/Sex: 74 y.o. female  : 1950  MRN: [id number]    Day of Service: 24   Length of Stay: 6  Encounter Provider: AARON Llanes  Place of Service: [facility]  Patient Care Team:  Provider, No Known as PCP - General    Subjective:     Chief Complaint: Sinus tach and CHF    Interval History: Resting comfortably. Breathing at baseline. Doesn't want to be bothered today    Objective:     Objective:  Temp:  [97.5 °F (36.4 °C)-98.1 °F (36.7 °C)] 97.5 °F (36.4 °C)  Heart Rate:  [113-136] 115  Resp:  [18-26] 18  BP: ()/(48-80) 106/80     Intake/Output Summary (Last 24 hours) at 2024 1003  Last data filed at 2024 0500  Gross per 24 hour   Intake --   Output 950 ml   Net -950 ml     Body mass index is 14.64 kg/m².      24  0523 12/15/24  0429 24  0500   Weight: 35.8 kg (78 lb 14.8 oz) 36.8 kg (81 lb 2.1 oz) 36.3 kg (80 lb 0.4 oz)     Weight change: -0.5 kg (-1 lb 1.6 oz)    Physical Exam:   General Appearance:    Awake alert and oriented in no acute distress.   Color:  Skin:  Neuro:  HEENT:    Lungs:     Pink  Warm and dry  No focal, motor or sensory deficits  Neck supple, pupils equal, round and reactive. No JVD, No Bruit  Scattered wheezes, faint crackles in bilateral bases to auscultation,respirations regular, even and                  unlabored    Heart:    Regular rate and rhythm, S1 and S2, no murmur, no gallop, no rub. No edema, DP/PT pulses are 2+   Chest Wall:    No abnormalities observed   Abdomen:     Normal bowel sounds, no masses, no organomegaly, soft        non-tender, non-distended, no guarding, no ascites noted   Extremities:   Moves all extremities well, no edema, no cyanosis, no redness       Lab Review:   Results from last 7 days   Lab Units 24  0721 12/15/24  0907 24  0834 24  0350   SODIUM mmol/L 144  --  143 141  "  POTASSIUM mmol/L 3.3*  --  4.0 3.1*   CHLORIDE mmol/L 101  --  106 103   CO2 mmol/L 27.7  --  28.0 26.0   BUN mg/dL 14  --  14 18   CREATININE mg/dL 1.03* 0.92 0.67 0.76   GLUCOSE mg/dL 103*  --  108* 109*   CALCIUM mg/dL 7.6*  --  7.8* 7.6*   AST (SGOT) U/L 57*  --   --  48*   ALT (SGPT) U/L 32  --   --  30     Results from last 7 days   Lab Units 12/11/24  0646 12/10/24  1817 12/10/24  1714   HSTROP T ng/L 52* 49* 48*     Results from last 7 days   Lab Units 12/16/24  0623 12/14/24  0511   WBC 10*3/mm3 10.58 10.72   HEMOGLOBIN g/dL 9.9* 9.5*   HEMATOCRIT % 29.2* 27.9*   PLATELETS 10*3/mm3 559* 514*         Results from last 7 days   Lab Units 12/10/24  1714   MAGNESIUM mg/dL 2.3           Invalid input(s): \"LDLCALC\"  Results from last 7 days   Lab Units 12/14/24  1039 12/10/24  1714   PROBNP pg/mL 8,301.0* 10,957.0*         I reviewed the patient's new clinical results.  I personally viewed and interpreted the patient's EKG  Current Medications:   Scheduled Meds:enoxaparin, 1 mg/kg, Subcutaneous, Q12H  magic mouthwash oral suspension (mixture) (diphenhydrAMINE HCl - aluminum & magnesium hydroxide-simethicone - lidocaine viscous) solution 55 mL, 5 mL, Swish & Spit, Q6H  metoprolol succinate XL, 25 mg, Oral, Q24H  pantoprazole, 40 mg, Oral, Daily  piperacillin-tazobactam, 3.375 g, Intravenous, Q8H  QUEtiapine, 200 mg, Oral, BID      Continuous Infusions:Pharmacy to Dose enoxaparin (LOVENOX),         Allergies:  No Known Allergies    Assessment/Plan:        Micoplasma PNA- on ABX  Chronic systolic CHF- stable volume status at this time- Got diuretics over the weekend and got hypotensive. BP has been stable since yesterday  Sinus tachycardia- remains with sinus tachycardia 100-120's.  Abnormal LFT's- Followed by Hospitalist   Hypotension- stable at this time.    AARON Llanes  12/16/24  10:03 EST  Electronically signed by AARON Llanes, 12/16/24, 10:03 AM EST.     "

## 2024-12-16 NOTE — THERAPY TREATMENT NOTE
Patient Name: Alanna Machuca  : 1950    MRN: [id number]                              Today's Date: 2024       Admit Date: 12/10/2024    Visit Dx:     ICD-10-CM ICD-9-CM   1. Pneumonia of both lungs due to Mycoplasma pneumoniae, unspecified part of lung  J15.7 483.0   2. Other acute pulmonary embolism, unspecified whether acute cor pulmonale present  I26.99 415.19   3. Hypoxia  R09.02 799.02   4. Elevated brain natriuretic peptide (BNP) level  R79.89 790.99   5. 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 No pertinent past medical history.  History reviewed. No pertinent surgical history.   General Information       Row Name 24 0957          Physical Therapy Time and Intention    Document Type therapy note (daily note)  -SV     Mode of Treatment individual therapy;physical therapy  -SV       Row Name 24 0957          General Information    Patient Profile Reviewed yes  -SV               User Key  (r) = Recorded By, (t) = Taken By, (c) = Cosigned By      Initials Name Provider Type    SV Jolanta Trinh, PT Physical Therapist                   Mobility       Row Name 24 1008          Bed Mobility    Bed Mobility supine-sit;sit-supine;scooting/bridging  -SV     Scooting/Bridging [place] (Bed Mobility) standby assist;contact guard  -SV     Supine-Sit [place] (Bed Mobility) minimum assist (75% patient effort)  -SV     Sit-Supine [place] (Bed Mobility) contact guard;minimum assist (75% patient effort)  -SV               User Key  (r) = Recorded By, (t) = Taken By, (c) = Cosigned By      Initials Name  Provider Type    Jolanta Hamilton, PT Physical Therapist                   Obj/Interventions       [facility] Name 12/16/24 1008          Motor Skills    Therapeutic Exercise --  encouraged ankle rom : AAROm 5 reps df/pf until pt declined further  -[facility] Name 12/16/24 1008          Balance    Balance Assessment sitting static balance  -SV     Static Sitting Balance contact guard;standby assist  2 minutes  -SV               User Key  (r) = Recorded By, (t) = Taken By, (c) = Cosigned By      Initials Name Provider Type    Jolanta Hamilton, PT Physical Therapist                   Goals/Plan    No documentation.                  Clinical Impression       Row Name 12/16/24 1009          Pain    Additional Documentation Pain Scale: FACES Pre/Post-Treatment (Group)  -SV       Row Name 12/16/24 1009          Pain Scale: FACES Pre/Post-Treatment    Pain: FACES Scale, Pretreatment 2-->hurts little bit  -SV     Pre/Posttreatment Pain Comment no report of pain ,no direct sign but pt not smiling today  -SV       Row Name 12/16/24 1009          Vital Signs    Intratreatment Heart Rate (beats/min) 125  -SV     Posttreatment Heart Rate (beats/min) 120  -SV     Pre SpO2 (%) 95  -SV     O2 Delivery Pre Treatment supplemental O2  -SV     Intra SpO2 (%) 88  -SV     O2 Delivery Intra Treatment supplemental O2  -SV     Post SpO2 (%) 90  -SV     O2 Delivery Post Treatment supplemental O2  -SV     Pre Patient Position Supine  -SV     Intra Patient Position Sitting  -SV     Post Patient Position Supine  -SV       Row Name 12/16/24 1009          Positioning and Restraints    Pre-Treatment Position in bed  -SV     Post Treatment Position bed  -SV     In Bed notified nsg;call light within reach;encouraged to call for assist;exit alarm on  -SV               User Key  (r) = Recorded By, (t) = Taken By, (c) = Cosigned By      Initials Name Provider Type    Jolanta Hamilton, PT Physical Therapist                   Outcome Measures        Row Name 12/16/24 1010          How much help from another person do you currently need...    Turning from your back to your side while in flat bed without using bedrails? 4  -SV     Moving from lying on back to sitting on the side of a flat bed without bedrails? 3  -SV     Moving to and from a bed to a chair (including a wheelchair)? 2  -SV     Standing up from a chair using your arms (e.g., wheelchair, bedside chair)? 2  -SV     Climbing 3-5 steps with a railing? 1  -SV     To walk in hospital room? 1  -SV     AM-PAC 6 Clicks Score (PT) 13  -SV               User Key  (r) = Recorded By, (t) = Taken By, (c) = Cosigned By      Initials Name Provider Type     Jolanta Trinh, PT Physical Therapist                                 Physical Therapy Education       Title: PT OT SLP Therapies (In Progress)       Topic: Physical Therapy (In Progress)       Point: Mobility training (In Progress)       Learning Progress Summary            Patient Acceptance, E, NR by  at 12/16/2024 1011    Acceptance, E, NR by  at 12/12/2024 1308                      Point: Home exercise program (In Progress)       Learning Progress Summary            Patient Acceptance, E, NR by  at 12/16/2024 1011    Acceptance, E, NR by  at 12/12/2024 1308                                      User Key       Initials Effective Dates Name Provider Type Discipline     07/11/23 -  Jolanta Trinh, PT Physical Therapist PT                  PT Recommendation and Plan  Planned Therapy Interventions (PT): balance training, bed mobility training, gait training, home exercise program, patient/family education, stretching, strengthening, stair training, ROM (range of motion), transfer training        Time Calculation:         PT Charges       Row Name 12/16/24 1014             Time Calculation    Start Time 0957  -      Stop Time 1007  -SV      Time Calculation (min) 10 min  -SV      PT Received On 12/16/24  -      PT - Next Appointment 12/17/24   -SV                User Key  (r) = Recorded By, (t) = Taken By, (c) = Cosigned By      Initials Name Provider Type    SV Jolanta Trinh, PT Physical Therapist                  Therapy Charges for Today       Code Description Service Date Service Provider Modifiers Qty    [id number]  PT THERAPEUTIC ACT EA 15 MIN 12/16/2024 Jolanta Trinh, PT GP 1            PT G-Codes  AM-PAC 6 Clicks Score (PT): 13  PT Discharge Summary  Anticipated Discharge Disposition (PT): home with assist, home with home health, skilled nursing facility (TBD)    Jolanta Trinh, PT  12/16/2024

## 2024-12-16 NOTE — CONSULTS
Purpose of the visit was to evaluate for: goals of care/advanced care planning and support for patient/family. Spoke with MD, RN, and CCP as well as patient and HCS and discussed palliative care, goals of care, care options, resuscitation status, [facility] scattered bed status, and discharge options.      Assessment:  Patient is palliative care appropriate for inpatient care given (list diagnosis/symptoms):  74 year old woman with pneumonia, severe malnutrition, acute on chronic respiratory failure, PE, new CHF diagnosis, and history of schizophrenia. Prior to this hospital stay patient was living at home with her caregiver Beverly. She has not been able to work successfully with PT, today just sat edge of bed. Nutrition significantly limited by reduced intake. Patient denies pain or other symptomatic complaints at this time, just wants to be left alone. PPS 40%    Cultural and spiritual needs have been assessed. Patient was raised in [place]. Palliative care  to visit with patient further to assess spiritual needs.     Recommendations/Plan: Change code status to comfort measures only. Gear all treatment options towards symptom management. Transfer to  and consult [facility]. Depending on patient's needs, family thinks patient may need to stay inpatient.     Other Comments: Spoke with patient's legal guardian Urszula by phone. Urszula is a family friend and has known the patient for a long time. We discussed goals of care, treatment options and code status in detail. Urszula has good understanding of situation. She is concerned about patient's quality of life, and about her ability to recovery to the point where she could go home again with her caregiver. She does not want any life prolonging treatments or interventions that will not improve quality of life. We discussed options for inpatient and outpatient palliative care and hospice services. She is requesting transfer to inpatient palliative care  unit, where symptom management needs will be assessed. If she qualifies for inpatient hospice may end up staying  until end of life, otherwise will explore outpatient hospice services. Updated Dr. Esparza, MAYNOR Gutierrez, SACHIN Floyd.

## 2024-12-16 NOTE — PROGRESS NOTES
"    DAILY PROGRESS NOTE  [facility]    Patient Identification:  Name: Alanna Machuca  Age: 74 y.o.  Sex: female  :  1950  MRN: [id number]         Primary Care Physician: Provider, No Known    Subjective:  Interval History: History and ROS is difficult to pull out of her at times.  Speech can be difficult to discern as well.  She was resting comfortably upon entering the room completely supine in no respiratory distress.  She was easily awakened and still does not feel great overall.  She does not seem to have the same respiratory distress she previously had.  Denies any current chest pain    Objective: No family at bedside.  Case discussed in multidisciplinary rounds    Scheduled Meds:enoxaparin, 1 mg/kg, Subcutaneous, Q12H  magic mouthwash oral suspension (mixture) (diphenhydrAMINE HCl - aluminum & magnesium hydroxide-simethicone - lidocaine viscous) solution 55 mL, 5 mL, Swish & Spit, Q6H  metoprolol succinate XL, 25 mg, Oral, Q24H  pantoprazole, 40 mg, Oral, Daily  piperacillin-tazobactam, 3.375 g, Intravenous, Q8H  potassium chloride, 40 mEq, Oral, Once  QUEtiapine, 200 mg, Oral, BID      Continuous Infusions:Pharmacy to Dose enoxaparin (LOVENOX),         Vital signs in last 24 hours:  Temp:  [97.5 °F (36.4 °C)-98.1 °F (36.7 °C)] 97.5 °F (36.4 °C)  Heart Rate:  [113-136] 115  Resp:  [18-26] 18  BP: ()/(48-80) 106/80    Intake/Output:    Intake/Output Summary (Last 24 hours) at 2024 1055  Last data filed at 2024 0500  Gross per 24 hour   Intake --   Output 950 ml   Net -950 ml       Exam:  /80 (BP Location: Left arm, Patient Position: Lying)   Pulse 115   Temp 97.5 °F (36.4 °C) (Oral)   Resp 18   Ht 157.5 cm (62\")   Wt 36.3 kg (80 lb 0.4 oz)   SpO2 96%   BMI 14.64 kg/m²     General Appearance:    Alert, elderly and overall very very frail.                          Head:    Normocephalic, without obvious abnormality, atraumatic                           Eyes: "    PERRLA/EOM's intact, both eyes                         Throat: Mucous membranes still dry but improved, no OP lesions, melanosis of the tongue                           Neck:   No JVD                         Lungs:    Improved air entry and less coarse in the last couple days with some upper airway referred breath sounds greater on the right to auscultation bilaterally, respirations unlabored laying supine with no use of accessory muscles                          Heart:    Regular rate and rhythm, S1 and S2 normal                  Abdomen:     Soft, nontender, bowel sounds active                 Extremities: Moving all, no pitting edema                        Pulses:   Pulses palpable in lower extremities                  Neurologic:   CNII-XII intact     Data Review:  Labs in chart were reviewed.    Assessment:  Active Hospital Problems    Diagnosis  POA    **Mycoplasma pneumonia [J15.7]  Unknown    Severe protein-calorie malnutrition [E43]  Yes    Acute and chronic respiratory failure with hypoxia [J96.21]  Unknown    LFTs abnormal [R79.89]  Unknown    Sepsis due to pneumonia [J18.9, A41.9]  Unknown    Anemia, chronic disease [D63.8]  Unknown    Schizophrenia [F20.9]  Unknown    Pulmonary embolism [I26.99]  Yes      Resolved Hospital Problems   No resolved problems to display.       Plan:    Multifactorial acute respiratory failure:   -Pulmonary embolism on Lovenox   -Initially mycoplasma with treatment completed but then evolved towards aspiration pneumonia with severe dysphagia and antibiotics adjusted towards Zosyn D3.  Leukocytosis resolved.  BC NGTD   -New diagnosis of CHF with an EF of 30 to 35% and wall motion abnormalities.  Cardiology assisting with fluid balance which has been tough as patient has received IVF as well as additional diuretics but we are cautious with further diuretics based on fluid balance and O2 requirements   -Reactive tachycardia is multifactorial also compounded by current  hospitalization/schizophrenia  -on Toprol   -Modified diet/nectar thick per my recommendations as speech was okay with thins and based on what I have seen at bedside with p.o. intake, very low threshold to make completely n.p.o.  Would not support any type of feeding tube as it will not decrease her risk of aspiration.  Continue oral mouthwash   -My concern is the overall clinical picture with pulmonary embolism Mycoplasma and then aspiration pneumonia with new onset CHF and a very frail petite patient and the overall clinical picture to me is consistent with failing to thrive        Mildly elevated LFTs improved with normal bilirubin-no plans to further trend    ACD/thrombocytosis with CBC stable    Hypokalemia replace -trial powder -check mag with a.m. labs    GERD-PPI    Subcarinal mass evaluated by TTS with plans for outpatient PET but no intervention or biopsy at this time    Schizophrenia on Seroquel        Disposition -TBD.  We are awaiting palliative care to coordinate with guardian and a meeting is pending but in the meantime it was conveyed that okay to switch to DNR and I completely concur with that thought process      Addendum -just received contact from palliative RN who has clarified further treatment plan with legal guardian.  Granted DNR CODE STATUS this morning and now ready to discontinue all antibiotics and medical management with transfer to [facility] and full inflammation of palliative care order set.  For what its worth I completely agree with this changing goals of care.  Patient is not decisional at baseline and is not thriving with full medical management.    I kept Ann's Magic mouthwash and Seroquel as currently ordered and patient can continue these as long as she is able to take them but truly not necessary going forward with probable quick decline that will be noted once palliative care medications are implemented      Gonzálze Esparza MD  12/16/2024  10:55 EST

## 2024-12-16 NOTE — CASE MANAGEMENT/SOCIAL WORK
Continued Stay Note  [facility]     Patient Name: Alanna Machuca  MRN: [id number]  Today's Date: 12/16/2024    Admit Date: 12/10/2024    Plan: Plan is to return home with Beverly/Caregiver. [facility]   Discharge Plan       Row Name 12/16/24 1101       Plan    Plan Comments Discussed in AM huddle; Palliative consult pending. CCP confirmed with CCP Manager and pt's guardian is a private guardian and not a state appointed guardian. Therefore, consult/discussions regarding DNR/Palliative care will be completed with the guardian. Rene MCGUIRE/CCP                   Discharge Codes    No documentation.                 Expected Discharge Date and Time       Expected Discharge Date Expected Discharge Time    Dec 15, 2024               Mariel Calhoun RN

## 2024-12-17 PROCEDURE — 25010000002 LORAZEPAM PER 2 MG: Performed by: HOSPITALIST

## 2024-12-17 RX ADMIN — QUETIAPINE FUMARATE 200 MG: 50 TABLET ORAL at 11:16

## 2024-12-17 RX ADMIN — LORAZEPAM 0.5 MG: 2 INJECTION INTRAMUSCULAR; INTRAVENOUS at 11:18

## 2024-12-17 RX ADMIN — DIPHENHYDRAMINE HCL ORAL 5 ML: 25 SOLUTION ORAL at 05:41

## 2024-12-17 RX ADMIN — QUETIAPINE FUMARATE 200 MG: 50 TABLET ORAL at 20:41

## 2024-12-17 RX ADMIN — DIPHENHYDRAMINE HCL ORAL 5 ML: 25 SOLUTION ORAL at 11:22

## 2024-12-17 NOTE — PLAN OF CARE
Problem: Adult Inpatient Plan of Care  Goal: Plan of Care Review  Outcome: Progressing  Flowsheets (Taken 12/16/2024 1840)  Progress: no change  Outcome Evaluation: patient arrived to unit as a palliative transfer. no complaints of pain. pt is in a brief. will continue to keep comfortable  Plan of Care Reviewed With: patient

## 2024-12-17 NOTE — CONSULTS
Visit with patient at bedside. RN mentioned that patient would like a  for anointing of the sick. Patient confirmed. [facility] hotline called and they are working on finding a .

## 2024-12-17 NOTE — PROGRESS NOTES
Name: Alanna Machuca ADMIT: 12/10/2024   : 1950  PCP: Provider, No Known    MRN: [id number] LOS: 7 days   AGE/SEX: 74 y.o. female  ROOM: 80/1     Subjective   Subjective   Pt c/o productive cough, worse after she eats or drinks. Mild SOA. No CP or F/C. Voiding well.       Objective   Objective   Vital Signs  Temp:  [97 °F (36.1 °C)-97.1 °F (36.2 °C)] 97 °F (36.1 °C)  Heart Rate:  [106-123] 123  Resp:  [16-18] 16  BP: (103-106)/(60-65) 106/60  SpO2:  [93 %-98 %] 93 %  on  Flow (L/min) (Oxygen Therapy):  [2] 2;   Device (Oxygen Therapy): room air  Body mass index is 14.64 kg/m².  Physical Exam  Vitals and nursing note reviewed.   Constitutional:       General: She is not in acute distress.     Appearance: She is ill-appearing. She is not toxic-appearing or diaphoretic.   HENT:      Head: Normocephalic.      Nose: Nose normal.      Mouth/Throat:      Mouth: Mucous membranes are moist.      Pharynx: Oropharynx is clear.      Comments: Poor dentition   Eyes:      General: No scleral icterus.        Right eye: No discharge.         Left eye: No discharge.      Conjunctiva/sclera: Conjunctivae normal.   Cardiovascular:      Rate and Rhythm: Regular rhythm. Tachycardia present.      Pulses: Normal pulses.   Pulmonary:      Effort: Pulmonary effort is normal. No respiratory distress.      Breath sounds: Normal breath sounds. No wheezing or rales.      Comments: Anteriorly   Abdominal:      General: Bowel sounds are normal. There is no distension.      Palpations: Abdomen is soft.      Tenderness: There is no abdominal tenderness.   Musculoskeletal:         General: No swelling.      Cervical back: Neck supple.   Skin:     General: Skin is warm and dry.      Capillary Refill: Capillary refill takes less than 2 seconds.      Coloration: Skin is not jaundiced.   Neurological:      Mental Status: She is alert. Mental status is at baseline.      Cranial Nerves: No cranial nerve deficit.      Coordination:  "Coordination normal.      Comments: Oriented to person   Psychiatric:      Comments: Pleasant though affect flat       Results Review     I reviewed the patient's new clinical results.  Results from last 7 days   Lab Units 12/16/24  0623 12/14/24  0511 12/12/24  0350 12/11/24  0311   WBC 10*3/mm3 10.58 10.72 14.04* 20.46*   HEMOGLOBIN g/dL 9.9* 9.5* 9.2* 9.5*   PLATELETS 10*3/mm3 559* 514* 452* 601*     Results from last 7 days   Lab Units 12/16/24  0721 12/15/24  0907 12/13/24  0834 12/12/24  0350 12/11/24  0311   SODIUM mmol/L 144  --  143 141 140   POTASSIUM mmol/L 3.3*  --  4.0 3.1* 3.5   CHLORIDE mmol/L 101  --  106 103 103   CO2 mmol/L 27.7  --  28.0 26.0 22.9   BUN mg/dL 14  --  14 18 20   CREATININE mg/dL 1.03* 0.92 0.67 0.76 0.79   GLUCOSE mg/dL 103*  --  108* 109* 109*   EGFR mL/min/1.73 56.8* 65.1 91.3 81.8 78.1     Results from last 7 days   Lab Units 12/16/24  0721 12/12/24  0350   ALBUMIN g/dL 2.6* 2.4*   BILIRUBIN mg/dL 0.3 0.4   ALK PHOS U/L 149* 147*   AST (SGOT) U/L 57* 48*   ALT (SGPT) U/L 32 30     Results from last 7 days   Lab Units 12/16/24  0721 12/13/24  0834 12/12/24  0350 12/11/24  0311   CALCIUM mg/dL 7.6* 7.8* 7.6* 7.7*   ALBUMIN g/dL 2.6*  --  2.4*  --      Results from last 7 days   Lab Units 12/10/24  2019   LACTATE mmol/L 1.3     No results found for: \"HGBA1C\", \"POCGLU\"    No radiology results for the last day    I have personally reviewed all medications:  Scheduled Medications  magic mouthwash oral suspension (mixture) (diphenhydrAMINE HCl - aluminum & magnesium hydroxide-simethicone - lidocaine viscous) solution 55 mL, 5 mL, Swish & Spit, Q6H  QUEtiapine, 200 mg, Oral, BID    Infusions   Diet  Diet: Regular/House; Fluid Consistency: Thin (IDDSI 0)    I have personally reviewed:  [x]  Laboratory   []  Microbiology   []  Radiology   []  EKG/Telemetry  []  Cardiology/Vascular   []  Pathology    [x]  Records       Assessment/Plan     Active Hospital Problems    Diagnosis  POA    " **Mycoplasma pneumonia [J15.7]  Unknown    Aspiration pneumonia due to vomitus [J69.0]  Unknown    Severe protein-calorie malnutrition [E43]  Yes    Acute and chronic respiratory failure with hypoxia [J96.21]  Unknown    LFTs abnormal [R79.89]  Unknown    Sepsis due to pneumonia [J18.9, A41.9]  Unknown    Anemia, chronic disease [D63.8]  Unknown    Schizophrenia [F20.9]  Unknown    Pulmonary embolism [I26.99]  Yes      Resolved Hospital Problems   No resolved problems to display.       75yo woman with schizophrenia who was sent to ER from PCP office with hypoxia. She was admitted with acute hypoxic respiratory failure due to Mycoplasma PNA and acute MARILYN PE. Admission complicated by dysphagia, aspiration pneumonia, newly noted subcarinal mass, and new diagnosis of systolic CHF (EF 30-35%). Pt was followed by Card and Thoracic Surg.    Despite aggressive therapies the pt has failed to make any significant progress. Palliative Care discussed case with pt's guardian and on 12/16 decision was made to change goals of care to comfort. She was transferred to [facility] for focus on symptom management. Antibiotics and AC were stopped and palliative care order set initiated. Ongoing evaluation of progress--will need to determine next steps over the coming days: inpt hospice vs outpt hospice.  Continue Seroquel.  So far has only required a dose of IV Ativan around 11am today for symptom mgmt  She is afebrile but also tachycardic with soft BPs      No indication  for DVT prophylaxis.  DNR.  Discussed with patient.  Anticipate discharge  to SNF with hospice vs remain in inpt hospice setting for end of life care.        Wes Justice MD  [facility]  12/17/24  17:51 EST

## 2024-12-17 NOTE — PLAN OF CARE
Goal Outcome Evaluation:  Plan of Care Reviewed With: guardian        Progress: no change  Outcome Evaluation: Pt alert to self. Pt received 0.5mg Ativan this afternoon for anxiety. F/C in place. Pt makes no complaints of pain at this time.

## 2024-12-17 NOTE — PAYOR COMM NOTE
"Alanna Castellanos (74 y.o. Female)          U/D FOR [id number]    CONTACT F# [phone]         Date of Birth   1950    Social Security Number       Address   [address] [id number]    Home Phone   [phone]    MRN   [id number]       Conf[employer]ianism   [employer]    Marital Status   Single                            Admission Date   12/10/24    Admission Type   Emergency    Admitting Provider   González Esparza MD    Attending Provider   Wes Justice MD    Department, Room/Bed   [address], P480/1       Discharge Date       Discharge Disposition       Discharge Destination                                 Attending Provider: Wes Justice MD    Allergies: No Known Allergies    Isolation: Droplet   Infection: Mycoplasma pneumonia (12/10/24)   Code Status: No CPR    Ht: 157.5 cm (62\")   Wt: 36.3 kg (80 lb 0.4 oz)    Admission Cmt: None   Principal Problem: Mycoplasma pneumonia [J15.7]                   Active Insurance as of 12/10/2024       Primary Coverage       Payor Plan Insurance Group Employer/Plan Group    PASSPORT MEDICARE ADVANTAGE PASSPORT ADVANTAGE NON PAR [id number]       Payor Plan Address Payor Plan Phone Number Payor Plan Fax Number Effective Dates    [address]   1/1/2024 - None Entered    [place] [address]         Subscriber Name Subscriber Birth Date Member ID       ALANNA CASTELLANOS 1950 [id number]               Secondary Coverage       Payor Plan Insurance Group Employer/Plan Group    [place]        Payor Plan Address Payor Plan Phone Number Payor Plan Fax Number Effective Dates    [address] [phone]  2/14/2024 - None Entered    [place] [address]         Subscriber Name Subscriber Birth Date Member ID       ALANNA CASTELLANOS 1950 [id number]                     Emergency Contacts        (Rel.) Home Phone Work Phone Mobile Phone    CHERI,IRINA (Legal Guardian) [phone] -- " "[phone]    bia marin (Care Giver) [phone] -- --                 Physician Progress Notes (last 48 hours)        González Esparza MD at 24 1055              DAILY PROGRESS NOTE  [facility]    Patient Identification:  Name: Alanna Machuca  Age: 74 y.o.  Sex: female  :  1950  MRN: [id number]         Primary Care Physician: Provider, No Known    Subjective:  Interval History: History and ROS is difficult to pull out of her at times.  Speech can be difficult to discern as well.  She was resting comfortably upon entering the room completely supine in no respiratory distress.  She was easily awakened and still does not feel great overall.  She does not seem to have the same respiratory distress she previously had.  Denies any current chest pain    Objective: No family at bedside.  Case discussed in multidisciplinary rounds    Scheduled Meds:enoxaparin, 1 mg/kg, Subcutaneous, Q12H  magic mouthwash oral suspension (mixture) (diphenhydrAMINE HCl - aluminum & magnesium hydroxide-simethicone - lidocaine viscous) solution 55 mL, 5 mL, Swish & Spit, Q6H  metoprolol succinate XL, 25 mg, Oral, Q24H  pantoprazole, 40 mg, Oral, Daily  piperacillin-tazobactam, 3.375 g, Intravenous, Q8H  potassium chloride, 40 mEq, Oral, Once  QUEtiapine, 200 mg, Oral, BID      Continuous Infusions:Pharmacy to Dose enoxaparin (LOVENOX),         Vital signs in last 24 hours:  Temp:  [97.5 °F (36.4 °C)-98.1 °F (36.7 °C)] 97.5 °F (36.4 °C)  Heart Rate:  [113-136] 115  Resp:  [18-26] 18  BP: ()/(48-80) 106/80    Intake/Output:    Intake/Output Summary (Last 24 hours) at 2024 1055  Last data filed at 2024 0500  Gross per 24 hour   Intake --   Output 950 ml   Net -950 ml       Exam:  /80 (BP Location: Left arm, Patient Position: Lying)   Pulse 115   Temp 97.5 °F (36.4 °C) (Oral)   Resp 18   Ht 157.5 cm (62\")   Wt 36.3 kg (80 lb 0.4 oz)   SpO2 96%   BMI 14.64 kg/m²     General " Appearance:    Alert, elderly and overall very very frail.                          Head:    Normocephalic, without obvious abnormality, atraumatic                           Eyes:    PERRLA/EOM's intact, both eyes                         Throat: Mucous membranes still dry but improved, no OP lesions, melanosis of the tongue                           Neck:   No JVD                         Lungs:    Improved air entry and less coarse in the last couple days with some upper airway referred breath sounds greater on the right to auscultation bilaterally, respirations unlabored laying supine with no use of accessory muscles                          Heart:    Regular rate and rhythm, S1 and S2 normal                  Abdomen:     Soft, nontender, bowel sounds active                 Extremities: Moving all, no pitting edema                        Pulses:   Pulses palpable in lower extremities                  Neurologic:   CNII-XII intact     Data Review:  Labs in chart were reviewed.    Assessment:  Active Hospital Problems    Diagnosis  POA    **Mycoplasma pneumonia [J15.7]  Unknown    Severe protein-calorie malnutrition [E43]  Yes    Acute and chronic respiratory failure with hypoxia [J96.21]  Unknown    LFTs abnormal [R79.89]  Unknown    Sepsis due to pneumonia [J18.9, A41.9]  Unknown    Anemia, chronic disease [D63.8]  Unknown    Schizophrenia [F20.9]  Unknown    Pulmonary embolism [I26.99]  Yes      Resolved Hospital Problems   No resolved problems to display.       Plan:    Multifactorial acute respiratory failure:   -Pulmonary embolism on Lovenox   -Initially mycoplasma with treatment completed but then evolved towards aspiration pneumonia with severe dysphagia and antibiotics adjusted towards Zosyn D3.  Leukocytosis resolved.  BC NGTD   -New diagnosis of CHF with an EF of 30 to 35% and wall motion abnormalities.  Cardiology assisting with fluid balance which has been tough as patient has received IVF as well as  additional diuretics but we are cautious with further diuretics based on fluid balance and O2 requirements   -Reactive tachycardia is multifactorial also compounded by current hospitalization/schizophrenia  -on Toprol   -Modified diet/nectar thick per my recommendations as speech was okay with thins and based on what I have seen at bedside with p.o. intake, very low threshold to make completely n.p.o.  Would not support any type of feeding tube as it will not decrease her risk of aspiration.  Continue oral mouthwash   -My concern is the overall clinical picture with pulmonary embolism Mycoplasma and then aspiration pneumonia with new onset CHF and a very frail petite patient and the overall clinical picture to me is consistent with failing to thrive        Mildly elevated LFTs improved with normal bilirubin-no plans to further trend    ACD/thrombocytosis with CBC stable    Hypokalemia replace -trial powder -check mag with a.m. labs    GERD-PPI    Subcarinal mass evaluated by TTS with plans for outpatient PET but no intervention or biopsy at this time    Schizophrenia on Seroquel        Disposition -TBD.  We are awaiting palliative care to coordinate with guardian and a meeting is pending but in the meantime it was conveyed that okay to switch to DNR and I completely concur with that thought process      Addendum -just received contact from palliative RN who has clarified further treatment plan with legal guardian.  Granted DNR CODE STATUS this morning and now ready to discontinue all antibiotics and medical management with transfer to [facility] and full inflammation of palliative care order set.  For what its worth I completely agree with this changing goals of care.  Patient is not decisional at baseline and is not thriving with full medical management.    I kept Ann's Magic mouthwash and Seroquel as currently ordered and patient can continue these as long as she is able to take them but truly not necessary going  forward with probable quick decline that will be noted once palliative care medications are implemented      González Esparza MD  2024  10:55 EST      Electronically signed by González Esparza MD at 24 1143       Hanane Aguiar APRN at 24 1003              Hospital Follow Up    LOS:  LOS: 6 days   Patient Name: Alanna Machuca  Age/Sex: 74 y.o. female  : 1950  MRN: [id number]    Day of Service: 24   Length of Stay: 6  Encounter Provider: AARON Llanes  Place of Service: [facility] CARDIOLOGY  Patient Care Team:  Provider, No Known as PCP - General    Subjective:     Chief Complaint: Sinus tach and CHF    Interval History: Resting comfortably. Breathing at baseline. Doesn't want to be bothered today    Objective:     Objective:  Temp:  [97.5 °F (36.4 °C)-98.1 °F (36.7 °C)] 97.5 °F (36.4 °C)  Heart Rate:  [113-136] 115  Resp:  [18-26] 18  BP: ()/(48-80) 106/80     Intake/Output Summary (Last 24 hours) at 2024 1003  Last data filed at 2024 0500  Gross per 24 hour   Intake --   Output 950 ml   Net -950 ml     Body mass index is 14.64 kg/m².      24  0523 12/15/24  0429 24  0500   Weight: 35.8 kg (78 lb 14.8 oz) 36.8 kg (81 lb 2.1 oz) 36.3 kg (80 lb 0.4 oz)     Weight change: -0.5 kg (-1 lb 1.6 oz)    Physical Exam:   General Appearance:    Awake alert and oriented in no acute distress.   Color:  Skin:  Neuro:  HEENT:    Lungs:     Pink  Warm and dry  No focal, motor or sensory deficits  Neck supple, pupils equal, round and reactive. No JVD, No Bruit  Scattered wheezes, faint crackles in bilateral bases to auscultation,respirations regular, even and                  unlabored    Heart:    Regular rate and rhythm, S1 and S2, no murmur, no gallop, no rub. No edema, DP/PT pulses are 2+   Chest Wall:    No abnormalities observed   Abdomen:     Normal bowel sounds, no masses, no organomegaly, soft        non-tender,  "non-distended, no guarding, no ascites noted   Extremities:   Moves all extremities well, no edema, no cyanosis, no redness       Lab Review:   Results from last 7 days   Lab Units 12/16/24  0721 12/15/24  0907 12/13/24  0834 12/12/24  0350   SODIUM mmol/L 144  --  143 141   POTASSIUM mmol/L 3.3*  --  4.0 3.1*   CHLORIDE mmol/L 101  --  106 103   CO2 mmol/L 27.7  --  28.0 26.0   BUN mg/dL 14  --  14 18   CREATININE mg/dL 1.03* 0.92 0.67 0.76   GLUCOSE mg/dL 103*  --  108* 109*   CALCIUM mg/dL 7.6*  --  7.8* 7.6*   AST (SGOT) U/L 57*  --   --  48*   ALT (SGPT) U/L 32  --   --  30     Results from last 7 days   Lab Units 12/11/24  0646 12/10/24  1817 12/10/24  1714   HSTROP T ng/L 52* 49* 48*     Results from last 7 days   Lab Units 12/16/24  0623 12/14/24  0511   WBC 10*3/mm3 10.58 10.72   HEMOGLOBIN g/dL 9.9* 9.5*   HEMATOCRIT % 29.2* 27.9*   PLATELETS 10*3/mm3 559* 514*         Results from last 7 days   Lab Units 12/10/24  1714   MAGNESIUM mg/dL 2.3           Invalid input(s): \"LDLCALC\"  Results from last 7 days   Lab Units 12/14/24  1039 12/10/24  1714   PROBNP pg/mL 8,301.0* 10,957.0*         I reviewed the patient's new clinical results.  I personally viewed and interpreted the patient's EKG  Current Medications:   Scheduled Meds:enoxaparin, 1 mg/kg, Subcutaneous, Q12H  magic mouthwash oral suspension (mixture) (diphenhydrAMINE HCl - aluminum & magnesium hydroxide-simethicone - lidocaine viscous) solution 55 mL, 5 mL, Swish & Spit, Q6H  metoprolol succinate XL, 25 mg, Oral, Q24H  pantoprazole, 40 mg, Oral, Daily  piperacillin-tazobactam, 3.375 g, Intravenous, Q8H  QUEtiapine, 200 mg, Oral, BID      Continuous Infusions:Pharmacy to Dose enoxaparin (LOVENOX),         Allergies:  No Known Allergies    Assessment/Plan:        Collin HERRERA- on ABX  Chronic systolic CHF- stable volume status at this time- Got diuretics over the weekend and got hypotensive. BP has been stable since yesterday  Sinus tachycardia- " remains with sinus tachycardia 100-120's.  Abnormal LFT's- Followed by Hospitalist   Hypotension- stable at this time.    AARON Llanes  12/16/24  10:03 EST  Electronically signed by AARON Llanes, 12/16/24, 10:03 AM EST.       Electronically signed by Hanane Aguiar APRN at 12/16/24 1011          Consult Notes (last 48 hours)        Mariel Church at 12/17/24 1308          Visit with patient at bedside. RN mentioned that patient would like a  for anointing of the sick. Patient confirmed. [employer] called and they are working on finding a .     Electronically signed by Mariel Church at 12/17/24 1311         Suni Neves RN   Registered Nurse  Palliative Care     Consults     Signed     Date of Service: 12/16/24 1135  Creation Time: 12/16/24 1135  Consult Orders   Inpatient Palliative Care Team Consult [[id number]] ordered by González Esparza MD at 12/14/24 1053          Signed         Purpose of the visit was to evaluate for: goals of care/advanced care planning and support for patient/family. Spoke with MD, RN, and CCP as well as patient and HCS and discussed palliative care, goals of care, care options, resuscitation status, [facility] scattered bed status, and discharge options.       Assessment:  Patient is palliative care appropriate for inpatient care given (list diagnosis/symptoms):  74 year old woman with pneumonia, severe malnutrition, acute on chronic respiratory failure, PE, new CHF diagnosis, and history of schizophrenia. Prior to this hospital stay patient was living at home with her caregiver Beverly. She has not been able to work successfully with PT, today just sat edge of bed. Nutrition significantly limited by reduced intake. Patient denies pain or other symptomatic complaints at this time, just wants to be left alone. PPS 40%     Cultural and spiritual needs have been assessed. Patient was raised in [employer] [place]. Palliative care   to visit with patient further to assess spiritual needs.      Recommendations/Plan: Change code status to comfort measures only. Gear all treatment options towards symptom management. Transfer to  and consult [facility]. Depending on patient's needs, family thinks patient may need to stay inpatient.      Other Comments: Spoke with patient's legal guardian Urszula by phone. Urszula is a family friend and has known the patient for a long time. We discussed goals of care, treatment options and code status in detail. Urszula has good understanding of situation. She is concerned about patient's quality of life, and about her ability to recovery to the point where she could go home again with her caregiver. She does not want any life prolonging treatments or interventions that will not improve quality of life. We discussed options for inpatient and outpatient palliative care and hospice services. She is requesting transfer to inpatient palliative care unit, where symptom management needs will be assessed. If she qualifies for inpatient hospice may end up staying  until end of life, otherwise will explore outpatient hospice services. Updated Dr. Esparza, MAYNOR Gutierrez, SACHIN Floyd.

## 2024-12-18 PROCEDURE — 25010000002 LORAZEPAM PER 2 MG: Performed by: HOSPITALIST

## 2024-12-18 RX ADMIN — DIPHENHYDRAMINE HCL ORAL 5 ML: 25 SOLUTION ORAL at 05:54

## 2024-12-18 RX ADMIN — LORAZEPAM 0.5 MG: 2 INJECTION INTRAMUSCULAR; INTRAVENOUS at 14:36

## 2024-12-18 RX ADMIN — QUETIAPINE FUMARATE 200 MG: 50 TABLET ORAL at 23:59

## 2024-12-18 RX ADMIN — QUETIAPINE FUMARATE 200 MG: 50 TABLET ORAL at 08:54

## 2024-12-18 NOTE — PLAN OF CARE
Goal Outcome Evaluation:  Plan of Care Reviewed With: patient        Progress: no change  Outcome Evaluation: Alert to self. No c/o pain. A little anxious at beginning of shift for a short period but no medication needed. F/C. Slept after taking nightly Seroquel.

## 2024-12-18 NOTE — PLAN OF CARE
Goal Outcome Evaluation:              Outcome Evaluation: Pt alert, confused to place and situation. PPS 30%. Appears very anxious and somewhat fearful. Scheduled Seroquel given this a.m. Pt rested well until afternoon. Anxiety returned and pt administered Ativan 0.5mg IV per order. IV to rt ac flushed. F/C to bsd. No family/friends at bedside. Nsg to cont with plan of care.

## 2024-12-18 NOTE — PROGRESS NOTES
Name: Alanna Machuca ADMIT: 12/10/2024   : 1950  PCP: Provider, No Known    MRN: [id number] LOS: 8 days   AGE/SEX: 74 y.o. female  ROOM: 80/1     Subjective   Subjective   Cough a little better today so far. Mild SOA at rest. No CP or F/C. Voiding well. Slept well last night.       Objective   Objective   Vital Signs  Temp:  [97 °F (36.1 °C)-97.3 °F (36.3 °C)] 97.3 °F (36.3 °C)  Heart Rate:  [107-123] 107  Resp:  [16-18] 18  BP: (106-111)/(60-66) 111/66  SpO2:  [93 %-94 %] 94 %  on  Flow (L/min) (Oxygen Therapy):  [2] 2;   Device (Oxygen Therapy): room air  Body mass index is 14.64 kg/m².    (No change in exam today)    Physical Exam  Vitals and nursing note reviewed.   Constitutional:       General: She is not in acute distress.     Appearance: She is ill-appearing. She is not toxic-appearing or diaphoretic.   HENT:      Head: Normocephalic.      Nose: Nose normal.      Mouth/Throat:      Mouth: Mucous membranes are moist.      Pharynx: Oropharynx is clear.      Comments: Poor dentition   Eyes:      General: No scleral icterus.        Right eye: No discharge.         Left eye: No discharge.      Conjunctiva/sclera: Conjunctivae normal.   Cardiovascular:      Rate and Rhythm: Regular rhythm. Tachycardia present.      Pulses: Normal pulses.   Pulmonary:      Effort: Pulmonary effort is normal. No respiratory distress.      Breath sounds: Normal breath sounds. No wheezing or rales.      Comments: Anteriorly   Abdominal:      General: Bowel sounds are normal. There is no distension.      Palpations: Abdomen is soft.      Tenderness: There is no abdominal tenderness.   Musculoskeletal:         General: No swelling.      Cervical back: Neck supple.   Skin:     General: Skin is warm and dry.      Capillary Refill: Capillary refill takes less than 2 seconds.      Coloration: Skin is not jaundiced.   Neurological:      Mental Status: She is alert. Mental status is at baseline.      Cranial Nerves: No cranial  "nerve deficit.      Coordination: Coordination normal.      Comments: Oriented to person   Psychiatric:      Comments: Pleasant though affect flat       Results Review     I reviewed the patient's new clinical results.  Results from last 7 days   Lab Units 12/16/24  0623 12/14/24  0511 12/12/24  0350   WBC 10*3/mm3 10.58 10.72 14.04*   HEMOGLOBIN g/dL 9.9* 9.5* 9.2*   PLATELETS 10*3/mm3 559* 514* 452*     Results from last 7 days   Lab Units 12/16/24  0721 12/15/24  0907 12/13/24  0834 12/12/24  0350   SODIUM mmol/L 144  --  143 141   POTASSIUM mmol/L 3.3*  --  4.0 3.1*   CHLORIDE mmol/L 101  --  106 103   CO2 mmol/L 27.7  --  28.0 26.0   BUN mg/dL 14  --  14 18   CREATININE mg/dL 1.03* 0.92 0.67 0.76   GLUCOSE mg/dL 103*  --  108* 109*   EGFR mL/min/1.73 56.8* 65.1 91.3 81.8     Results from last 7 days   Lab Units 12/16/24  0721 12/12/24  0350   ALBUMIN g/dL 2.6* 2.4*   BILIRUBIN mg/dL 0.3 0.4   ALK PHOS U/L 149* 147*   AST (SGOT) U/L 57* 48*   ALT (SGPT) U/L 32 30     Results from last 7 days   Lab Units 12/16/24  0721 12/13/24  0834 12/12/24  0350   CALCIUM mg/dL 7.6* 7.8* 7.6*   ALBUMIN g/dL 2.6*  --  2.4*           No results found for: \"HGBA1C\", \"POCGLU\"    No radiology results for the last day    I have personally reviewed all medications:  Scheduled Medications  magic mouthwash oral suspension (mixture) (diphenhydrAMINE HCl - aluminum & magnesium hydroxide-simethicone - lidocaine viscous) solution 55 mL, 5 mL, Swish & Spit, Q6H  QUEtiapine, 200 mg, Oral, BID    Infusions   Diet  Diet: Regular/House; Fluid Consistency: Thin (IDDSI 0)    I have personally reviewed:  []  Laboratory   []  Microbiology   []  Radiology   []  EKG/Telemetry  []  Cardiology/Vascular   []  Pathology    []  Records       Assessment/Plan     Active Hospital Problems    Diagnosis  POA    **Mycoplasma pneumonia [J15.7]  Unknown    Aspiration pneumonia due to vomitus [J69.0]  Unknown    Severe protein-calorie malnutrition [E43]  Yes    " Acute and chronic respiratory failure with hypoxia [J96.21]  Unknown    LFTs abnormal [R79.89]  Unknown    Sepsis due to pneumonia [J18.9, A41.9]  Unknown    Anemia, chronic disease [D63.8]  Unknown    Schizophrenia [F20.9]  Unknown    Pulmonary embolism [I26.99]  Yes      Resolved Hospital Problems   No resolved problems to display.       75yo woman with schizophrenia who was sent to ER from PCP office with hypoxia. She was admitted with acute hypoxic respiratory failure due to Mycoplasma PNA and acute MARILYN PE. Admission complicated by dysphagia, aspiration pneumonia, newly noted subcarinal mass, and new diagnosis of systolic CHF (EF 30-35%). Pt was followed by Card and Thoracic Surg.    Despite aggressive therapies the pt failed to make any significant progress. Palliative Care discussed case with pt's guardian and on 12/16 decision was made to change goals of care to comfort. She was transferred to [facility] for focus on symptom management. Antibiotics and AC were stopped and palliative care order set initiated. Ongoing evaluation of progress--will need to determine next steps over the coming days: inpt hospice vs outpt hospice.  Continue Seroquel.  So far has only required a single dose of IV Ativan around 11am yesterday for anxiety  VS somewhat improved today and weaned to RA now      No indication  for DVT prophylaxis in palliative pt.  DNR.  Discussed with patient. No family present.  Anticipate discharge  to SNF with hospice vs remain in inpt hospice setting for end of life care.        Wes Justice MD  [facility]  12/18/24  07:50 EST

## 2024-12-19 PROCEDURE — 25010000002 MORPHINE PER 10 MG: Performed by: HOSPITALIST

## 2024-12-19 PROCEDURE — 25010000002 LORAZEPAM PER 2 MG: Performed by: HOSPITALIST

## 2024-12-19 RX ADMIN — MORPHINE SULFATE 2 MG: 2 INJECTION, SOLUTION INTRAMUSCULAR; INTRAVENOUS at 23:15

## 2024-12-19 RX ADMIN — LORAZEPAM 0.5 MG: 2 INJECTION INTRAMUSCULAR; INTRAVENOUS at 23:15

## 2024-12-19 RX ADMIN — QUETIAPINE FUMARATE 200 MG: 50 TABLET ORAL at 09:40

## 2024-12-19 RX ADMIN — LORAZEPAM 0.5 MG: 2 INJECTION INTRAMUSCULAR; INTRAVENOUS at 15:13

## 2024-12-19 RX ADMIN — MORPHINE SULFATE 2 MG: 2 INJECTION, SOLUTION INTRAMUSCULAR; INTRAVENOUS at 09:48

## 2024-12-19 NOTE — PROGRESS NOTES
Name: Alanna Machuca ADMIT: 12/10/2024   : 1950  PCP: Provider, No Known    MRN: [id number] LOS: 9 days   AGE/SEX: 74 y.o. female  ROOM: hospitals/1     Subjective   Subjective   Patient seen at bedside today, she is evaluated by hospice.       Objective   Objective   Vital Signs  Temp:  [97.8 °F (36.6 °C)-99.5 °F (37.5 °C)] 99.5 °F (37.5 °C)  Heart Rate:  [121-123] 123  Resp:  [18-20] 18  BP: ()/(58-67) 99/58  SpO2:  [92 %-94 %] 94 %  on   ;   Device (Oxygen Therapy): room air  Body mass index is 14.64 kg/m².  Physical Exam  Vitals and nursing note reviewed.   Constitutional:       General: She is not in acute distress.     Appearance: She is ill-appearing. She is not toxic-appearing or diaphoretic.   HENT:      Head: Normocephalic.      Nose: Nose normal.      Mouth/Throat:      Mouth: Mucous membranes are moist.      Pharynx: Oropharynx is clear.      Comments: Poor dentition   Eyes:      General: No scleral icterus.        Right eye: No discharge.         Left eye: No discharge.      Conjunctiva/sclera: Conjunctivae normal.   Cardiovascular:      Rate and Rhythm: Regular rhythm. Tachycardia present.      Pulses: Normal pulses.   Pulmonary:      Effort: Pulmonary effort is normal. No respiratory distress.      Breath sounds: Normal breath sounds. No wheezing or rales.      Comments: Anteriorly   Abdominal:      General: Bowel sounds are normal. There is no distension.      Palpations: Abdomen is soft.      Tenderness: There is no abdominal tenderness.   Musculoskeletal:         General: No swelling.      Cervical back: Neck supple.   Skin:     General: Skin is warm and dry.      Capillary Refill: Capillary refill takes less than 2 seconds.      Coloration: Skin is not jaundiced.   Neurological:      Mental Status: She is alert. Mental status is at baseline.      Cranial Nerves: No cranial nerve deficit.      Coordination: Coordination normal.      Comments: Oriented to person   Psychiatric:       "Comments: Pleasant though affect flat      Copied text material from yesterday's note has been reviewed for appropriate changes and remains accurate as of 12/19/24.      Results Review     I reviewed the patient's new clinical results.  Results from last 7 days   Lab Units 12/16/24  0623 12/14/24  0511   WBC 10*3/mm3 10.58 10.72   HEMOGLOBIN g/dL 9.9* 9.5*   PLATELETS 10*3/mm3 559* 514*     Results from last 7 days   Lab Units 12/16/24  0721 12/15/24  0907 12/13/24  0834   SODIUM mmol/L 144  --  143   POTASSIUM mmol/L 3.3*  --  4.0   CHLORIDE mmol/L 101  --  106   CO2 mmol/L 27.7  --  28.0   BUN mg/dL 14  --  14   CREATININE mg/dL 1.03* 0.92 0.67   GLUCOSE mg/dL 103*  --  108*   EGFR mL/min/1.73 56.8* 65.1 91.3     Results from last 7 days   Lab Units 12/16/24  0721   ALBUMIN g/dL 2.6*   BILIRUBIN mg/dL 0.3   ALK PHOS U/L 149*   AST (SGOT) U/L 57*   ALT (SGPT) U/L 32     Results from last 7 days   Lab Units 12/16/24  0721 12/13/24  0834   CALCIUM mg/dL 7.6* 7.8*   ALBUMIN g/dL 2.6*  --        No results found for: \"HGBA1C\", \"POCGLU\"    No radiology results for the last day    I have personally reviewed all medications:  Scheduled Medications  magic mouthwash oral suspension (mixture) (diphenhydrAMINE HCl - aluminum & magnesium hydroxide-simethicone - lidocaine viscous) solution 55 mL, 5 mL, Swish & Spit, Q6H  QUEtiapine, 200 mg, Oral, BID    Infusions   Diet  Diet: Regular/House; Fluid Consistency: Thin (IDDSI 0)    I have personally reviewed:  [x]  Laboratory   [x]  Microbiology   [x]  Radiology   [x]  EKG/Telemetry  [x]  Cardiology/Vascular   []  Pathology    []  Records       Assessment/Plan     Active Hospital Problems    Diagnosis  POA    **Mycoplasma pneumonia [J15.7]  Unknown    Aspiration pneumonia due to vomitus [J69.0]  Unknown    Severe protein-calorie malnutrition [E43]  Yes    Acute and chronic respiratory failure with hypoxia [J96.21]  Unknown    LFTs abnormal [R79.89]  Unknown    Sepsis due to " pneumonia [J18.9, A41.9]  Unknown    Anemia, chronic disease [D63.8]  Unknown    Schizophrenia [F20.9]  Unknown    Pulmonary embolism [I26.99]  Yes      Resolved Hospital Problems   No resolved problems to display.       74 y.o. female admitted with Mycoplasma pneumonia.    73yo woman with schizophrenia who was sent to ER from PCP office with hypoxia. She was admitted with acute hypoxic respiratory failure due to Mycoplasma PNA and acute MARILYN PE. Admission complicated by dysphagia, aspiration pneumonia, newly noted subcarinal mass, and new diagnosis of systolic CHF (EF 30-35%). Pt was followed by Card and Thoracic Surg.     Despite aggressive therapies the pt failed to make any significant progress. Palliative Care discussed case with pt's guardian and on 12/16 decision was made to change goals of care to comfort. She was transferred to [facility] for focus on symptom management. Antibiotics and AC were stopped and palliative care order set initiated. Ongoing evaluation of progress--will need to determine next steps over the coming days: inpt hospice vs outpt hospice.  Continue Seroquel.  So far has only required a single dose of IV Ativan around 11am yesterday for anxiety  VS somewhat improved today and weaned to RA now        No indication  for DVT prophylaxis in palliative pt.  DNR.  Discussed with patient. No family present.  Anticipate discharge  to SNF with hospice vs remain in inpt hospice setting for end of life care.      Tanmay Montoya MD  [facility]  12/19/24  16:59 EST

## 2024-12-19 NOTE — PLAN OF CARE
Goal Outcome Evaluation:  Plan of Care Reviewed With: patient, family        Progress: declining  Outcome Evaluation: Pt bedbound, decreased po intake. Pt unable to take po seroquel this a.m d/t continuous cough. Medication crushed and administered in one bite of pudding.Pt had episodes of emesis from consistent cough. PRN Morphine IV administered after pt c/o sore throat from coughing and resp distress. Guardian at bedside, discussed pt's increased anxiety and requested PRN Ativan for pt. Pt now resting comfortably. [facility] following for possible admission.

## 2024-12-19 NOTE — PLAN OF CARE
Goal Outcome Evaluation:     Progress: declining    Pt very anxious this shift. Pt only took half of her scheduled Seroquel. Pt became tearful and refused the rest of her medication. Will continue with plan of care.

## 2024-12-19 NOTE — CONSULTS
"Arrived on unit, records reviewed. Met with the patient's nurse MAYNOR Nowak who provided updates. The patient has a cough and received oral Seroquel that was crushed. Patient was evaluated by Speech who performed a swallowing evaluation and recommends \"smooth foods such as pudding and jello\" She is taking minimal sips and bites. She has also had increasing symptoms requiring Lorazepam 0.5 mg IV x 1 and Morphine 2 mg IV x 1. The patient is normally alert however now drowsy. PPS 20-30%    Spoke to the patient's court appointed guardian on the phone and discussed goals of care. The patient is a DNR and goals are comfort focused care. Ideally she would prefer the patient remain on [facility] for care. Explanation of hospice IP services discussed in detail. Guardian gives verbal understanding.     Call placed to hospice medical director Dr. Pricila Villegas who states the patient is hospice eligible for home care but not GIP eligible as she has demonstrated the ability to swallow crushed meds.   She recommends a trial of oral concentrate Lorazepam and Morphine to see if this will manage her symptoms first. If not hospice can re-evaluate for IP services.     Message sent to attending Dr. Montoya to inform him of evaluation and recommendations provided by Dr. Villegas.     Update provided to [facility] MAYNOR Arroyo and MAYNOR Nowak. Hospice will follow up remotely daily.     Thank you for the referral and allowing us to participate in the care of this patient. Please call with any questions or concerns.     Hospice patient ID [id number]    NERISSA Jerome, RN   Referral and Admission Coordinator  [facility]  [phone]    "

## 2024-12-19 NOTE — PROGRESS NOTES
Palliative Social Work Note    Purpose of visit: Initial visit  Assessment: Patient complaining of pain in her abdomen and appears anxious at time of assessment. Frequent periods of drowsiness noted. RN notified. PPS: 20%  Support System: Present at bedside and Involved in care  Psychosocial Needs Identified: None identified at this time  Plan/Other Comments:     SW met with patient's guardian, Urszula, at bedside this afternoon. Explained role and purpose of visit for psychosocial support. Urszula shared that patient is her mother's cousin, who she decided to pursue guardianship for after patient had been placed in a few facilities where she did not receive great care. Urszula noted that she had just recently spoke with hospice regarding HSB evaluation and patient did not qualify at this time. She understands that hospice will continue to evaluate and discharge planning may be indicated pending patient's clinical course. No needs identified at this time. Discussed with RN. Will remain available for continued support.

## 2024-12-19 NOTE — CASE MANAGEMENT/SOCIAL WORK
Continued Stay Note  [facility]     Patient Name: Alanna Machuca  MRN: [id number]  Today's Date: 12/19/2024    Admit Date: 12/10/2024    Plan: Pending   Discharge Plan       Row Name 12/19/24 1420       Plan    Plan Pending    Plan Comments Patient does not currently qualify for HSB but will be re-assessed daily. CCP will continue to follow.                   Discharge Codes    No documentation.                 Expected Discharge Date and Time       Expected Discharge Date Expected Discharge Time    Dec 19, 2024               HIREN Lovelace

## 2024-12-20 VITALS
WEIGHT: 80.03 LBS | TEMPERATURE: 97.2 F | HEART RATE: 123 BPM | RESPIRATION RATE: 18 BRPM | SYSTOLIC BLOOD PRESSURE: 135 MMHG | OXYGEN SATURATION: 94 % | DIASTOLIC BLOOD PRESSURE: 76 MMHG | HEIGHT: 62 IN | BODY MASS INDEX: 14.73 KG/M2

## 2024-12-20 PROBLEM — J15.7 PNEUMONIA DUE TO MYCOPLASMA PNEUMONIAE: Status: ACTIVE | Noted: 2024-01-01

## 2024-12-20 PROCEDURE — 25010000002 LORAZEPAM PER 2 MG: Performed by: HOSPITALIST

## 2024-12-20 PROCEDURE — 25010000002 MORPHINE PER 10 MG: Performed by: HOSPITALIST

## 2024-12-20 RX ORDER — LORAZEPAM 2 MG/ML
1 INJECTION INTRAMUSCULAR
Status: CANCELLED | OUTPATIENT
Start: 2024-12-20 | End: 2024-12-21

## 2024-12-20 RX ORDER — HYDROMORPHONE HYDROCHLORIDE 1 MG/ML
0.5 INJECTION, SOLUTION INTRAMUSCULAR; INTRAVENOUS; SUBCUTANEOUS
Status: CANCELLED | OUTPATIENT
Start: 2024-12-20 | End: 2024-12-23

## 2024-12-20 RX ORDER — ACETAMINOPHEN 160 MG/5ML
650 SOLUTION ORAL EVERY 4 HOURS PRN
Status: CANCELLED | OUTPATIENT
Start: 2024-12-20

## 2024-12-20 RX ORDER — ACETAMINOPHEN 325 MG/1
650 TABLET ORAL EVERY 4 HOURS PRN
Status: CANCELLED | OUTPATIENT
Start: 2024-12-20

## 2024-12-20 RX ORDER — GLYCOPYRROLATE 0.2 MG/ML
0.4 INJECTION INTRAMUSCULAR; INTRAVENOUS
Status: CANCELLED | OUTPATIENT
Start: 2024-12-20

## 2024-12-20 RX ORDER — DIPHENHYDRAMINE HYDROCHLORIDE 50 MG/ML
25 INJECTION INTRAMUSCULAR; INTRAVENOUS EVERY 6 HOURS PRN
Status: CANCELLED | OUTPATIENT
Start: 2024-12-20

## 2024-12-20 RX ORDER — MORPHINE SULFATE 20 MG/ML
5 SOLUTION ORAL
Status: CANCELLED | OUTPATIENT
Start: 2024-12-20 | End: 2024-12-23

## 2024-12-20 RX ORDER — GLYCOPYRROLATE 0.2 MG/ML
0.2 INJECTION INTRAMUSCULAR; INTRAVENOUS
Status: CANCELLED | OUTPATIENT
Start: 2024-12-20

## 2024-12-20 RX ORDER — LORAZEPAM 2 MG/ML
2 INJECTION INTRAMUSCULAR
Status: CANCELLED | OUTPATIENT
Start: 2024-12-20 | End: 2024-12-21

## 2024-12-20 RX ORDER — DIPHENHYDRAMINE HCL 25 MG
25 CAPSULE ORAL EVERY 6 HOURS PRN
Status: CANCELLED | OUTPATIENT
Start: 2024-12-20

## 2024-12-20 RX ORDER — HYDROMORPHONE HYDROCHLORIDE 2 MG/ML
1.5 INJECTION, SOLUTION INTRAMUSCULAR; INTRAVENOUS; SUBCUTANEOUS
Status: CANCELLED | OUTPATIENT
Start: 2024-12-20 | End: 2024-12-23

## 2024-12-20 RX ORDER — MORPHINE SULFATE 10 MG/ML
6 INJECTION INTRAMUSCULAR; INTRAVENOUS; SUBCUTANEOUS
Status: CANCELLED | OUTPATIENT
Start: 2024-12-20 | End: 2024-12-23

## 2024-12-20 RX ORDER — LORAZEPAM 2 MG/ML
0.5 INJECTION INTRAMUSCULAR
Status: CANCELLED | OUTPATIENT
Start: 2024-12-20 | End: 2024-12-21

## 2024-12-20 RX ORDER — PROMETHAZINE HYDROCHLORIDE 12.5 MG/1
12.5 SUPPOSITORY RECTAL EVERY 4 HOURS PRN
Status: CANCELLED | OUTPATIENT
Start: 2024-12-20

## 2024-12-20 RX ORDER — PROMETHAZINE HYDROCHLORIDE 25 MG/1
12.5 TABLET ORAL EVERY 4 HOURS PRN
Status: CANCELLED | OUTPATIENT
Start: 2024-12-20

## 2024-12-20 RX ORDER — SCOLOPAMINE TRANSDERMAL SYSTEM 1 MG/1
1 PATCH, EXTENDED RELEASE TRANSDERMAL
Status: CANCELLED | OUTPATIENT
Start: 2024-12-20

## 2024-12-20 RX ORDER — MORPHINE SULFATE 4 MG/ML
4 INJECTION, SOLUTION INTRAMUSCULAR; INTRAVENOUS
Status: CANCELLED | OUTPATIENT
Start: 2024-12-20 | End: 2024-12-23

## 2024-12-20 RX ORDER — DIPHENOXYLATE HYDROCHLORIDE AND ATROPINE SULFATE 2.5; .025 MG/1; MG/1
1 TABLET ORAL
Status: CANCELLED | OUTPATIENT
Start: 2024-12-20

## 2024-12-20 RX ORDER — KETOROLAC TROMETHAMINE 15 MG/ML
15 INJECTION, SOLUTION INTRAMUSCULAR; INTRAVENOUS EVERY 6 HOURS PRN
Status: CANCELLED | OUTPATIENT
Start: 2024-12-20 | End: 2024-12-21

## 2024-12-20 RX ORDER — LORAZEPAM 2 MG/ML
1 CONCENTRATE ORAL
Status: CANCELLED | OUTPATIENT
Start: 2024-12-20 | End: 2024-12-21

## 2024-12-20 RX ORDER — MORPHINE SULFATE 20 MG/ML
20 SOLUTION ORAL
Status: CANCELLED | OUTPATIENT
Start: 2024-12-20 | End: 2024-12-23

## 2024-12-20 RX ORDER — MORPHINE SULFATE 2 MG/ML
2 INJECTION, SOLUTION INTRAMUSCULAR; INTRAVENOUS
Status: CANCELLED | OUTPATIENT
Start: 2024-12-20 | End: 2024-12-23

## 2024-12-20 RX ORDER — LIDOCAINE HYDROCHLORIDE 20 MG/ML
5 SOLUTION OROPHARYNGEAL EVERY 4 HOURS PRN
Status: CANCELLED | OUTPATIENT
Start: 2024-12-20

## 2024-12-20 RX ORDER — LORAZEPAM 2 MG/ML
2 CONCENTRATE ORAL
Status: CANCELLED | OUTPATIENT
Start: 2024-12-20 | End: 2024-12-21

## 2024-12-20 RX ORDER — MORPHINE SULFATE 20 MG/ML
10 SOLUTION ORAL
Status: CANCELLED | OUTPATIENT
Start: 2024-12-20 | End: 2024-12-23

## 2024-12-20 RX ORDER — LORAZEPAM 2 MG/ML
0.5 CONCENTRATE ORAL
Status: CANCELLED | OUTPATIENT
Start: 2024-12-20 | End: 2024-12-21

## 2024-12-20 RX ORDER — QUETIAPINE FUMARATE 50 MG/1
200 TABLET, FILM COATED ORAL 2 TIMES DAILY
Status: CANCELLED | OUTPATIENT
Start: 2024-12-20

## 2024-12-20 RX ORDER — ACETAMINOPHEN 650 MG/1
650 SUPPOSITORY RECTAL EVERY 4 HOURS PRN
Status: CANCELLED | OUTPATIENT
Start: 2024-12-20

## 2024-12-20 RX ADMIN — LORAZEPAM 0.5 MG: 2 INJECTION INTRAMUSCULAR; INTRAVENOUS at 13:32

## 2024-12-20 RX ADMIN — MORPHINE SULFATE 2 MG: 2 INJECTION, SOLUTION INTRAMUSCULAR; INTRAVENOUS at 13:32

## 2024-12-20 NOTE — DISCHARGE SUMMARY
Date of Discharge:  12/20/2024    PCP: Provider, No Known    Discharge Diagnosis:   Active Hospital Problems    Diagnosis  POA    **Mycoplasma pneumonia [J15.7]  Unknown    Aspiration pneumonia due to vomitus [J69.0]  Unknown    Severe protein-calorie malnutrition [E43]  Yes    Acute and chronic respiratory failure with hypoxia [J96.21]  Unknown    LFTs abnormal [R79.89]  Unknown    Sepsis due to pneumonia [J18.9, A41.9]  Unknown    Anemia, chronic disease [D63.8]  Unknown    Schizophrenia [F20.9]  Unknown    Pulmonary embolism [I26.99]  Yes      Resolved Hospital Problems   No resolved problems to display.          Consults       Date and Time Order Name Status Description    12/11/2024  9:29 AM Inpatient Thoracic Surgery Consult Completed           Hospital Course  Patient is a 74 y.o. female     75yo woman with schizophrenia who was sent to ER from PCP office with hypoxia. She was admitted with acute hypoxic respiratory failure due to Mycoplasma PNA and acute MARILYN PE. Admission complicated by dysphagia, aspiration pneumonia, newly noted subcarinal mass, and new diagnosis of systolic CHF (EF 30-35%). Pt was followed by Card and Thoracic Surg.    Despite aggressive therapies the pt failed to make any significant progress. Palliative Care discussed case with pt's guardian and on 12/16 decision was made to change goals of care to comfort. She was transferred to [facility] for focus on symptom management. Antibiotics were stopped and palliative care order set initiated.     Hospice evaluated the patient, decision has been made to transition the patient to inpatient hospice today.  Time spent is more than 30 minutes.    Temp:  [97.2 °F (36.2 °C)-98.1 °F (36.7 °C)] 97.2 °F (36.2 °C)  Heart Rate:  [114-123] 123  Resp:  [18] 18  BP: (128-135)/(76-86) 135/76  Body mass index is 14.64 kg/m².    Physical Exam  General, lethargic  Head and ENT, normocephalic and atraumatic.  Lungs, symmetric expansion, equal air entry  bilaterally.  Heart, regular rate and rhythm.  Abdomen, soft and nontender.  Extremities, no clubbing or cyanosis.  Skin: Warm and no rash.  Musculoskeletal, joint examination is grossly normal.        Disposition: Hospice/Medical Facility ([facility])       Discharge Medications        ASK your doctor about these medications        Instructions Start Date   alendronate 70 MG tablet  Commonly known as: FOSAMAX   70 mg, Every 7 Days      levocetirizine 5 MG tablet  Commonly known as: XYZAL   5 mg, Every Evening      pantoprazole 40 MG EC tablet  Commonly known as: PROTONIX   40 mg, Daily      QUEtiapine 200 MG tablet  Commonly known as: SEROquel   200 mg, 2 Times Daily                Additional Instructions for the Follow-ups that You Need to Schedule       NM PET/CT Skull Base to Mid Thigh    Dec 17, 2024 (Approximate)      What radiopharmaceutical is preferred for this exam?: FDG  (offered at all sites)   Exam reason: Subcarinal mass   Release to patient: Routine Release               Follow-up Information       Shailesh Umaña MD Follow up on 1/2/2025.    Specialty: Thoracic Surgery  Why: following outpatient PET.  Contact information:  [address] [place], [place] [id number] [phone]               Provider, No Known .    Contact information:  [place] [id number] [phone]                            Future Appointments   Date Time Provider Department Center   1/9/2025 11:45 AM Shailesh Umaña MD [facility] JEROMY Montoya MD  [facility]  12/20/24 16:55 EST    Discharge time spent greater than 30 minutes.

## 2024-12-20 NOTE — PLAN OF CARE
Goal Outcome Evaluation:  Plan of Care Reviewed With: patient        Progress: declining  Outcome Evaluation: PPS 20%. Continues to cough. When awake she very anxious. Morphine 2mg and Ativan 0.5mg IV for symptoms. Dayshift reported difficulty swallowing pills. She did not want to take night Seroquel. F/C.

## 2024-12-20 NOTE — CONSULTS
HSB admit 12/20/24  [facility] ID [id number]    Primary diagnosis: ICD 10 J15.7    Pt is needing IV symptom management.    Guardian signed consents. [facility] to follow up daily.    Thank you for the referral.    Zeina Arroyo RN  [facility]  [phone]

## 2024-12-21 NOTE — H&P
HISTORY AND PHYSICAL   [facility]        Patient Identification:  Name: Alanna Machuca  Age: 74 y.o.  Sex: female  :  1950  MRN: [id number]                     Primary Care Physician: Provider, No Known    Chief Complaint: Hospice patient for comfort care    History of Present Illness:      The patient 75yo woman with schizophrenia who was sent to ER from PCP office with hypoxia. She was admitted with acute hypoxic respiratory failure due to Mycoplasma PNA and acute MARILYN PE. Admission complicated by dysphagia, aspiration pneumonia, newly noted subcarinal mass, and new diagnosis of systolic CHF (EF 30-35%). Pt was followed by Card and Thoracic Surg.     Despite aggressive therapies the pt failed to make any significant progress. Palliative Care discussed case with pt's guardian and on  decision was made to change goals of care to comfort. She was transferred to [facility] for focus on symptom management. Antibiotics were stopped and palliative care order set initiated.      Hospice evaluated the patient, decision has been made to transition the patient to inpatient hospice    Past Medical History:  No past medical history on file.  Past Surgical History:  No past surgical history on file.   Home Meds:  Medications Prior to Admission   Medication Sig Dispense Refill Last Dose/Taking    alendronate (FOSAMAX) 70 MG tablet Take 1 tablet by mouth Every 7 (Seven) Days.       levocetirizine (XYZAL) 5 MG tablet Take 1 tablet by mouth Every Evening.       pantoprazole (PROTONIX) 40 MG EC tablet Take 1 tablet by mouth Daily.       QUEtiapine (SEROquel) 200 MG tablet Take 1 tablet by mouth 2 (Two) Times a Day.        Current meds    Current Facility-Administered Medications:     acetaminophen (TYLENOL) tablet 650 mg, 650 mg, Oral, Q4H PRN **OR** acetaminophen (TYLENOL) 160 MG/5ML oral solution 650 mg, 650 mg, Oral, Q4H PRN **OR** acetaminophen (TYLENOL) suppository 650 mg, 650 mg, Rectal, Q4H PRN, Jan  MD Tanmay    diphenhydrAMINE (BENADRYL) capsule 25 mg, 25 mg, Oral, Q6H PRN **OR** diphenhydrAMINE (BENADRYL) injection 25 mg, 25 mg, Intravenous, Q6H PRN, Tanmay Montoya MD    diphenoxylate-atropine (LOMOTIL) 2.5-0.025 MG per tablet 1 tablet, 1 tablet, Oral, Q2H PRN, Tanmay Montoya MD    glycopyrrolate (ROBINUL) injection 0.2 mg, 0.2 mg, Intravenous, Q2H PRN **OR** glycopyrrolate (ROBINUL) injection 0.2 mg, 0.2 mg, Subcutaneous, Q2H PRN **OR** glycopyrrolate (ROBINUL) injection 0.4 mg, 0.4 mg, Intravenous, Q2H PRN **OR** glycopyrrolate (ROBINUL) injection 0.4 mg, 0.4 mg, Subcutaneous, Q2H PRN, Tanmay Montoya MD    morphine injection 2 mg, 2 mg, Intravenous, Q1H PRN, 2 mg at 12/21/24 0050 **OR** morphine concentrated solution 5 mg, 5 mg, Sublingual, Q1H PRN **OR** HYDROmorphone (DILAUDID) injection 0.5 mg, 0.5 mg, Intravenous, Q1H PRN, Tanmay Montoya MD    Morphine sulfate (PF) injection 4 mg, 4 mg, Intravenous, Q1H PRN, 4 mg at 12/21/24 0834 **OR** morphine concentrated solution 10 mg, 10 mg, Sublingual, Q1H PRN **OR** HYDROmorphone (DILAUDID) injection 1 mg, 1 mg, Intravenous, Q1H PRN, Tanmay Montoya MD    Morphine injection 6 mg, 6 mg, Intravenous, Q1H PRN **OR** morphine concentrated solution 20 mg, 20 mg, Sublingual, Q1H PRN **OR** HYDROmorphone (DILAUDID) injection 1.5 mg, 1.5 mg, Intravenous, Q1H PRN, Tanmay Montoya MD    Lidocaine Viscous HCl (XYLOCAINE) 2 % solution 5 mL, 5 mL, Mouth/Throat, Q4H PRN, Tanmay Montoya MD    magic mouthwash oral suspension (mixture) (diphenhydrAMINE HCl - aluminum & magnesium hydroxide-simethicone - lidocaine viscous) solution 55 mL, 5 mL, Swish & Spit, Q6H, Tanmay Montoya MD    Polyvinyl Alcohol-Povidone PF (ARTIFICIAL TEARS) 1.4-0.6 % ophthalmic solution 1 drop, 1 drop, Both Eyes, Q30 Min PRN, Tanmay Montoya MD    promethazine (PHENERGAN) tablet 12.5 mg, 12.5 mg, Oral, Q4H PRN **OR** promethazine (PHENERGAN) suppository 12.5 mg, 12.5 mg, Rectal,  Q4H PRN, Tanmay Montoya MD    QUEtiapine (SEROquel) tablet 200 mg, 200 mg, Oral, BID, Tanmay Montoya MD    scopolamine patch 1 mg/72 hr, 1 patch, Transdermal, Q72H PRN, Tanmay Montoya MD  Allergies:  No Known Allergies  Immunizations:    There is no immunization history on file for this patient.  Social History:   Social History     Social History Narrative    Not on file     Social History     Socioeconomic History    Marital status: Single   Tobacco Use    Smoking status: Unknown   Vaping Use    Vaping status: Never Used   Substance and Sexual Activity    Alcohol use: Defer    Drug use: Defer    Sexual activity: Defer       Family History:  No family history on file.     Review of Systems  See history of present illness and past medical history.  Patient denies headache, dizziness, syncope, falls, trauma, change in vision, change in hearing, change in taste, changes in weight, changes in appetite, focal weakness, numbness, or paresthesia.  Patient denies chest pain, palpitations, dyspnea, orthopnea, PND, cough, sinus pressure, rhinorrhea, epistaxis, hemoptysis, nausea, vomiting, hematemesis, diarrhea, constipation or hematochezia. Denies cold or heat intolerance, polydipsia, polyuria, polyphagia. Denies hematuria, pyuria, dysuria, hesitancy, frequency or urgency. Denies consumption of raw and under cooked meats foods or change in water source.  Denies fever, chills, sweats, night sweats.  Denies missing any routine medications. Remainder of ROS is negative.    Objective:  tMax 24 hrs: Temp (24hrs), Av.5 °F (36.4 °C), Min:97.2 °F (36.2 °C), Max:97.7 °F (36.5 °C)    Vitals Ranges:   Temp:  [97.2 °F (36.2 °C)-97.7 °F (36.5 °C)] 97.7 °F (36.5 °C)  Heart Rate:  [123-124] 124  Resp:  [16-18] 16  BP: (118-135)/(76-77) 118/77      Exam:  /77 (BP Location: Right arm, Patient Position: Sitting)   Pulse (!) 124   Temp 97.7 °F (36.5 °C) (Oral)   Resp 16   SpO2 92%     General Appearance:  Sedated, no  distress, appears stated age   Head:    Normocephalic, without obvious abnormality, atraumatic   Eyes:    PERRL, conjunctivae/corneas clear, EOM's intact, both eyes   Ears:    Normal external ear canals, both ears   Nose:   Nares normal, septum midline, mucosa normal, no drainage    or sinus tenderness   Throat:   Lips, mucosa, and tongue normal   Neck:   Supple, symmetrical, trachea midline, no adenopathy;     thyroid:  no enlargement/tenderness/nodules; no carotid    bruit or JVD   Back:     Symmetric, no curvature, ROM normal, no CVA tenderness   Lungs:     Clear to auscultation bilaterally, respirations unlabored   Chest Wall:    No tenderness or deformity    Heart:    Regular rate and rhythm, S1 and S2 normal, no murmur, rub   or gallop   Abdomen:     Soft, nontender, bowel sounds active all four quadrants,     no masses, no hepatomegaly, no splenomegaly   Extremities:   Extremities normal, atraumatic, no cyanosis or edema   Pulses:   2+ and symmetric all extremities   Skin:   Skin color, texture, turgor normal, no rashes or lesions   Lymph nodes:   Cervical, supraclavicular, and axillary nodes normal   Neurologic: Sedated      .    Data Review:  Lab Results (last 72 hours)       ** No results found for the last 72 hours. **                     Imaging Results (All)       None          No past medical history on file.    Assessment:  Active Hospital Problems    Diagnosis  POA    **Pneumonia due to Mycoplasma pneumoniae [J15.7]  Yes      Resolved Hospital Problems   No resolved problems to display.       Plan:  Patient admitted to hospice scattered bed and continue with palliative care and comfort measures only.  Discussed with the nurse.  Measures in place.    Marcelo Hdz MD  12/21/2024  12:59 EST

## 2024-12-21 NOTE — PLAN OF CARE
Goal Outcome Evaluation:  Plan of Care Reviewed With: patient        Progress: declining  Outcome Evaluation: Pt minimally responsive. Premedicating before turns w/4mg morphine, 1mg Ativan. F/C in place. Pt appears comfortable and in no acute pain/distress at this time.

## 2024-12-21 NOTE — PLAN OF CARE
Goal Outcome Evaluation:  Plan of Care Reviewed With: patient        Progress: declining  Outcome Evaluation: Pt more lethargic today. Premedicating w/2mg morphine and 0.5mg Ativan for pain and agitation/anxiety. F/C in place. Pt appears more comfortable and in no acute pain/distress at this time.

## 2024-12-22 NOTE — PROGRESS NOTES
DAILY PROGRESS NOTE  [facility]    Patient Identification:  Name: Alanna Machuca  Age: 74 y.o.  Sex: female  :  1950  MRN: [id number]         Primary Care Physician: Provider, No Known    Subjective:  Interval History: She is sedated and resting comfortably.    Objective:    Scheduled Meds:magic mouthwash oral suspension (mixture) (diphenhydrAMINE HCl - aluminum & magnesium hydroxide-simethicone - lidocaine viscous) solution 55 mL, 5 mL, Swish & Spit, Q6H  QUEtiapine, 200 mg, Oral, BID      Continuous Infusions:     Vital signs in last 24 hours:  Temp:  [96.8 °F (36 °C)-97.3 °F (36.3 °C)] 96.8 °F (36 °C)  Heart Rate:  [112-117] 112  Resp:  [16-20] 16  BP: (122-130)/(68-76) 122/68    Intake/Output:    Intake/Output Summary (Last 24 hours) at 2024 1156  Last data filed at 2024 0323  Gross per 24 hour   Intake --   Output 150 ml   Net -150 ml       Exam:  /68 (BP Location: Right arm, Patient Position: Sitting)   Pulse 112   Temp 96.8 °F (36 °C) (Axillary)   Resp 16   SpO2 (!) 88%     General Appearance:  Sedated, no distress   Head:    Normocephalic, without obvious abnormality, atraumatic   Eyes:       Throat:   Lips, tongue, gums normal   Neck:   Supple, symmetrical, trachea midline, no JVD   Lungs:     Clear to auscultation bilaterally, respirations unlabored   Chest Wall:    No tenderness or deformity    Heart:    Regular rate and rhythm, S1 and S2 normal, no murmur,no  rub or gallop   Abdomen:     Soft, nontender, bowel sounds active, no masses, no organomegaly    Extremities:   Extremities normal, atraumatic, no cyanosis or edema   Pulses:      Skin:   Skin is warm and dry,  no rashes or palpable lesions   Neurologic: Sedated      Lab Results (last 72 hours)       ** No results found for the last 72 hours. **          Data Review:  Results from last 7 days   Lab Units 24  0721   SODIUM mmol/L 144   POTASSIUM mmol/L 3.3*   CHLORIDE mmol/L 101   CO2 mmol/L 27.7  "  BUN mg/dL 14   CREATININE mg/dL 1.03*   GLUCOSE mg/dL 103*   CALCIUM mg/dL 7.6*     Results from last 7 days   Lab Units 12/16/24  0623   WBC 10*3/mm3 10.58   HEMOGLOBIN g/dL 9.9*   HEMATOCRIT % 29.2*   PLATELETS 10*3/mm3 559*             Lab Results   Lab Value Date/Time    TROPONINT 52 (H) 12/11/2024 0646    TROPONINT 49 (H) 12/10/2024 1817    TROPONINT 48 (H) 12/10/2024 1714         Results from last 7 days   Lab Units 12/16/24  0721   ALK PHOS U/L 149*   BILIRUBIN mg/dL 0.3   ALT (SGPT) U/L 32   AST (SGOT) U/L 57*             No results found for: \"POCGLU\"        No past medical history on file.    Assessment:  Active Hospital Problems    Diagnosis  POA    **Pneumonia due to Mycoplasma pneumoniae [J15.7]  Yes      Resolved Hospital Problems   No resolved problems to display.       Plan:  Continue with comfort measures.  Measures in place.  Discussed with the nurse.    Marcelo Hdz MD  12/22/2024  11:56 EST   "

## 2024-12-22 NOTE — NURSING NOTE
Pt is a 74 yr old female admitted to the ED from her MD's office. She had a cough and O2 sats were in the 80's. Scan revealed a PE in left upper lobe of lung and PNA.- pt met sepsis criteria and was treated with antibiotics and fluids-she continued to decline-guardian contacted and chose to pursue comfort measures. Primary diagnosis is PNA from mycoplasma-hx of heart fx, resp fx and schizophrenia. [facility] is managing pain and dyspnea. At time of assessment pt is lying in bed with eyes closed-does not respond to voice or gentle tactile stimuli-per nurse she opens eyes but makes no attempt to speak. Lungs clear to diminished-BS hypo and all pulses are palpable. PPS10%. Over the past 24 hours pt has received IVP morphine 2mgs x4, lorazepam 1mg x2 and lorazepam 0.5mgs x2. VS as follows T97.3, P117, /76, RR20 and sats 91% on RA. Pt is GIP appropriate-requiring parenteral meds to manage pain and dyspnea as well as close monitoring by skilled nursing. Should she stabilize she would return home with her cargiver. [facility] will evaluate with every visit.

## 2024-12-22 NOTE — PLAN OF CARE
Goal Outcome Evaluation:           Progress: declining  Outcome Evaluation: Remains minimally responsive. 4mg morphine, 1 ativan given before turns. f/c care completed this shift. providing comfort care.

## 2024-12-22 NOTE — PLAN OF CARE
Goal Outcome Evaluation:  Plan of Care Reviewed With: guardian        Progress: declining  Outcome Evaluation: Pt unresponsive. Breathing changes noted this shift. Premedicating w/0.5mg Dilaudid and 2mg Ativan. Guardian notified of assessment findings. F/C in place. Pt appears comfortable and in no acute pain/distress at this time.

## 2024-12-22 NOTE — PLAN OF CARE
Problem: Adult Inpatient Plan of Care  Goal: Plan of Care Review  12/22/2024 0534 by Jazmyn Torres RN  Flowsheets (Taken 12/22/2024 0533)  Outcome Evaluation: Remains minimally responsive. 4mg morphine, 1 ativan given before turns. f/c care completed this shift. providing comfort care.  12/22/2024 0533 by Jazmyn Torres RN  Flowsheets (Taken 12/22/2024 0533)  Progress: declining  Outcome Evaluation: Remains minimally responsive. 4mg morphine, 1 ativan given before turns. f/c care completed this shift. providing comfort care.  Goal: Absence of Hospital-Acquired Illness or Injury  Intervention: Identify and Manage Fall Risk  Recent Flowsheet Documentation  Taken 12/22/2024 0412 by Jazmyn Torres RN  Safety Promotion/Fall Prevention:   nonskid shoes/slippers when out of bed   safety round/check completed   room organization consistent   lighting adjusted   fall prevention program maintained   clutter free environment maintained   assistive device/personal items within reach  Taken 12/22/2024 0206 by Jazmyn Torres RN  Safety Promotion/Fall Prevention:   nonskid shoes/slippers when out of bed   safety round/check completed   room organization consistent   lighting adjusted   fall prevention program maintained   clutter free environment maintained   assistive device/personal items within reach  Taken 12/21/2024 2050 by Jazmyn Torres RN  Safety Promotion/Fall Prevention:   nonskid shoes/slippers when out of bed   safety round/check completed   room organization consistent   lighting adjusted   fall prevention program maintained   clutter free environment maintained   assistive device/personal items within reach  Intervention: Prevent Skin Injury  Recent Flowsheet Documentation  Taken 12/22/2024 0412 by Jazmyn Torres RN  Body Position: weight shifting  Taken 12/22/2024 0206 by Jazmyn Torres RN  Body Position:   weight shifting   turned   right   tilted  Taken 12/21/2024 2050 by Jazmyn Torres  RN  Body Position: weight shifting  Skin Protection: incontinence pads utilized  Intervention: Prevent Infection  Recent Flowsheet Documentation  Taken 12/22/2024 0412 by Jazmyn Torres RN  Infection Prevention:   single patient room provided   rest/sleep promoted   personal protective equipment utilized   hand hygiene promoted  Taken 12/22/2024 0206 by Jazmyn Torres RN  Infection Prevention:   single patient room provided   rest/sleep promoted   personal protective equipment utilized   hand hygiene promoted  Taken 12/21/2024 2050 by Jazmyn Torres RN  Infection Prevention:   single patient room provided   rest/sleep promoted   personal protective equipment utilized   hand hygiene promoted  Goal: Optimal Comfort and Wellbeing  Intervention: Monitor Pain and Promote Comfort  Recent Flowsheet Documentation  Taken 12/21/2024 2050 by Jazmyn Torres RN  Pain Management Interventions:   premedicated for activity   pain medication given  Intervention: Provide Person-Centered Care  Recent Flowsheet Documentation  Taken 12/21/2024 2050 by Jazmyn Torres RN  Trust Relationship/Rapport:   reassurance provided   emotional support provided     Problem: Palliative Care  Goal: Enhanced Quality of Life  Intervention: Maximize Comfort  Recent Flowsheet Documentation  Taken 12/21/2024 2050 by Jazmyn Torres RN  Pain Management Interventions:   premedicated for activity   pain medication given  Intervention: Optimize Psychosocial Wellbeing  Recent Flowsheet Documentation  Taken 12/21/2024 2050 by Jazmyn Torres RN  Family/Support System Care: presence promoted     Problem: Skin Injury Risk Increased  Goal: Skin Health and Integrity  Intervention: Optimize Skin Protection  Recent Flowsheet Documentation  Taken 12/22/2024 0412 by Jazmyn Torres RN  Activity Management: activity encouraged  Head of Bed (HOB) Positioning: HOB elevated  Taken 12/22/2024 0206 by Jazmyn Torres RN  Activity Management: activity  minimized  Head of Bed (HOB) Positioning: HOB elevated  Taken 12/21/2024 2050 by Jazmyn Torres RN  Activity Management: activity minimized  Pressure Reduction Techniques:   frequent weight shift encouraged   heels elevated off bed   weight shift assistance provided  Head of Bed (HOB) Positioning: HOB elevated  Pressure Reduction Devices: alternating pressure pump (KATINA)  Skin Protection: incontinence pads utilized     Problem: Fall Injury Risk  Goal: Absence of Fall and Fall-Related Injury  Intervention: Identify and Manage Contributors  Recent Flowsheet Documentation  Taken 12/22/2024 0412 by Jazmyn Torres RN  Medication Review/Management: medications reviewed  Taken 12/22/2024 0206 by Jazmyn Torres RN  Medication Review/Management: medications reviewed  Taken 12/21/2024 2050 by Jazmyn Torres RN  Medication Review/Management: medications reviewed  Intervention: Promote Injury-Free Environment  Recent Flowsheet Documentation  Taken 12/22/2024 0412 by Jazmyn Torres RN  Safety Promotion/Fall Prevention:   nonskid shoes/slippers when out of bed   safety round/check completed   room organization consistent   lighting adjusted   fall prevention program maintained   clutter free environment maintained   assistive device/personal items within reach  Taken 12/22/2024 0206 by Jazmyn Torres RN  Safety Promotion/Fall Prevention:   nonskid shoes/slippers when out of bed   safety round/check completed   room organization consistent   lighting adjusted   fall prevention program maintained   clutter free environment maintained   assistive device/personal items within reach  Taken 12/21/2024 2050 by Jazmyn Torres RN  Safety Promotion/Fall Prevention:   nonskid shoes/slippers when out of bed   safety round/check completed   room organization consistent   lighting adjusted   fall prevention program maintained   clutter free environment maintained   assistive device/personal items within reach   Goal Outcome  Evaluation:           Progress: declining  Outcome Evaluation: Remains minimally responsive. 4mg morphine, 1 ativan given before turns. f/c care completed this shift. providing comfort care.

## 2024-12-22 NOTE — NURSING NOTE
Pt is a 74 yr old female admitted to the ED from her MD's office. She had a cough and O2 sats were in the 80's. Scan revealed a PE in left upper lobe of lung and PNA. Pt met sepsis criteria and was treated with antibiotics and fluids-she continued to decline-guardian contacted and chose to pursue comfort measures. Primary diagnosis is PNA from mycoplasma-hx of heart fx, resp fx and schizophrenia. [facility] is managing pain and dyspnea. At time of assessment pt is lying in bed with eyes closed-does not respond to voice or gentle tactile stimuli-still responds to pain. Lungs clear to diminished-BS hypo and all pulses are palpable. PPS10%. Over the past 24 hours pt has received IVP morphine 4mgs x6 and lorazepam 1mg x4. VS as follows T101.1ax, P101, BP94/53, RR30 and sats 84% on RA. No family at bedside-pt has a legal guardian-spoke with her by phone and encouraged her to visit pt soon-discussed s/s of decline-answered all questions and offered support-encouraged to call [facility] with needs or concerns. Pt is GIP appropriate-requiring parenteral meds to manage pain and dyspnea as well as close monitoring by skilled nursing. Should she stabilize she would return home with her caregiver. [facility] will evaluate with every visit.

## 2024-12-23 NOTE — PLAN OF CARE
Goal Outcome Evaluation:  Plan of Care Reviewed With: patient           Outcome Evaluation: Appears comfortable at rest. Medicated with ativan and dilaudid every 4 hours prior to turns. Will continue palliative care.

## 2024-12-23 NOTE — PLAN OF CARE
Goal Outcome Evaluation:  Plan of Care Reviewed With: family, durable power of         Progress: declining  Outcome Evaluation: PPS 10%. Pt opens eyes and responds to stimuli but is nonverbal. Pt not taking anything by mouth. Dilaudid and ativan given for symptom management. Pt has appeared comfortable. Breathing shallow but easy/even. So s/s of discomfort or distress. Pt's guardian at bedside for several hours today.

## 2024-12-23 NOTE — PROGRESS NOTES
SB team SW met with patient and family to explain role of SB team SW and assess for needs. Patient was lying in her hospital bed, unresponsive with no signs of pain or discomfort. Patient's guardian/cousin Urszula was at the bedside. Patient is currently GIP at [facility] [place]. SW sat with patient and family to provide supportive presence.   Patient is currently unresponsive, therefore SW unable to complete PHQ9. Patient has no children. Patient was living at a Medicaid waiver group home prior to this hospital stay. Family would like for patient to remain in a SB for EOL care due to her imminent prognosis. If patient were to stabilize and need to be discharged, she would return to her  group home with hospice to follow. SW educated on bereavement services provided by [facility] and family voiced understanding. Discussed  planning and family has selected [facility] [address]. SW will visit on a weekly and PRN basis to offer EOL support and assist with needs.

## 2024-12-23 NOTE — PROGRESS NOTES
[facility] Visit Report    Alanna Machuca  [id number]  12/23/2024    Admission R/T [facility] Dx: yes    Reason for [facility] Admission:    Patient is a 74 y.o. female with a primary [facility] diagnosis of PNA. Admitted to [facility] for GIP for symptom management of pain, dyspnea, restlessness, anxiety. Droplet     PPS: 10%    VS: Temp:  [97 °F (36.1 °C)-97.1 °F (36.2 °C)] 97.1 °F (36.2 °C)  Heart Rate:  [113-126] 126  Resp:  [16] 16  BP: (100-109)/(62-72) 100/62    Medications in 24 hours:  -IV dilaudid 0.5mg PRN x6  -IV ativan 2mg PRN x6    Recommendations:  Continue to monitor for signs of decline and provide comfort measures. Contact [facility] at [phone] with any questions or concerns and at TOD.     Assessment:  Patient is in bed on her right side and appears peaceful with her doll in her arm. She was unresponsive for me today. She was warm to touch with cool toes and slowed cap refill. Lungs are congested and will discuss with facility RN to monitor for PRN need for robinul. Pt is breathing slow with shallow breaths and an occasional cough. F/c patent with concentrated magdalena UOP and minimal b/s.      Collaboration:  Spoke with pts STEPHEN Tovar at the bedside with condition update and support provided. Training provided regarding assessment findings, disease progression, symptom management, recommendations and expected length of stay. Family v/u of pts condition and all questions were answered. Collaborated with [facility] RN and CCP about pts condition and recommendations.    Disposition:  Patient meets GIP criteria, requiring frequent administration and continued titration of parenteral medications to achieve and maintain symptom management. Patient appears to be unsafe for transport at this time, requiring increasing symptom management and frequent RN assessment. If patient were to stabilize she would require LTC placement due to increased daily care needs.  Will continue  [facility] RN visits to monitor for changes, assess needs and provide support.       Candida Hook RN  [facility] Health Visit Nurse  Scattered Bed Team

## 2024-12-23 NOTE — PROGRESS NOTES
DAILY PROGRESS NOTE  [facility]    Patient Identification:  Name: Alanna Machuca  Age: 74 y.o.  Sex: female  :  1950  MRN: [id number]         Primary Care Physician: Provider, No Known    Subjective:  Interval History: She is sedated and resting comfortably.    Objective:    Scheduled Meds:magic mouthwash oral suspension (mixture) (diphenhydrAMINE HCl - aluminum & magnesium hydroxide-simethicone - lidocaine viscous) solution 55 mL, 5 mL, Swish & Spit, Q6H  QUEtiapine, 200 mg, Oral, BID      Continuous Infusions:     Vital signs in last 24 hours:  Temp:  [97 °F (36.1 °C)-97.1 °F (36.2 °C)] 97.1 °F (36.2 °C)  Heart Rate:  [113-126] 126  Resp:  [16] 16  BP: (100-109)/(62-72) 100/62    Intake/Output:    Intake/Output Summary (Last 24 hours) at 2024 1336  Last data filed at 2024 1317  Gross per 24 hour   Intake --   Output 400 ml   Net -400 ml       Exam:  /62 (BP Location: Left arm, Patient Position: Lying)   Pulse (!) 126   Temp 97.1 °F (36.2 °C) (Oral)   Resp 16   SpO2 94%     General Appearance:  Sedated, no distress   Head:    Normocephalic, without obvious abnormality, atraumatic   Eyes:       Throat:   Lips, tongue, gums normal   Neck:   Supple, symmetrical, trachea midline, no JVD   Lungs:     Clear to auscultation bilaterally, respirations unlabored   Chest Wall:    No tenderness or deformity    Heart:    Regular rate and rhythm, S1 and S2 normal, no murmur,no  rub or gallop   Abdomen:     Soft, nontender, bowel sounds active, no masses, no organomegaly    Extremities:   Extremities normal, atraumatic, no cyanosis or edema   Pulses:      Skin:   Skin is warm and dry,  no rashes or palpable lesions   Neurologic: Sedated      Lab Results (last 72 hours)       ** No results found for the last 72 hours. **          Data Review:                      Lab Results   Lab Value Date/Time    TROPONINT 52 (H) 2024 0646    TROPONINT 49 (H) 12/10/2024 1817    TROPONINT 48  "(H) 12/10/2024 6584               Invalid input(s): \"PROT\", \"LABALBU\"            No results found for: \"POCGLU\"        No past medical history on file.    Assessment:  Active Hospital Problems    Diagnosis  POA    **Pneumonia due to Mycoplasma pneumoniae [J15.7]  Yes      Resolved Hospital Problems   No resolved problems to display.       Plan:  Continue with comfort measures.  Measures in place.  Discussed with the nurse and family at bedside.    Marcleo Hdz MD  12/23/2024  13:36 EST   "

## 2024-12-24 NOTE — PLAN OF CARE
Goal Outcome Evaluation:  Plan of Care Reviewed With: patient        Progress: declining  Outcome Evaluation: Appears comfortable at rest. Medicated with ativan and dilaudid every 4 hours prior to turns. Will continue palliative care.

## 2024-12-24 NOTE — PROGRESS NOTES
DAILY PROGRESS NOTE  [facility]    Patient Identification:  Name: Alanna Machuca  Age: 74 y.o.  Sex: female  :  1950  MRN: [id number]         Primary Care Physician: Provider, No Known    Subjective:  Interval History: She is sedated and resting comfortably.    Objective:    Scheduled Meds:magic mouthwash oral suspension (mixture) (diphenhydrAMINE HCl - aluminum & magnesium hydroxide-simethicone - lidocaine viscous) solution 55 mL, 5 mL, Swish & Spit, Q6H  QUEtiapine, 200 mg, Oral, BID      Continuous Infusions:     Vital signs in last 24 hours:  Temp:  [97.1 °F (36.2 °C)-98.6 °F (37 °C)] 98.6 °F (37 °C)  Heart Rate:  [126-130] 130  Resp:  [14-16] 14  BP: (100-129)/(62-81) 129/81    Intake/Output:    Intake/Output Summary (Last 24 hours) at 2024 1158  Last data filed at 2024 0507  Gross per 24 hour   Intake --   Output 475 ml   Net -475 ml       Exam:  /81 (BP Location: Right arm, Patient Position: Lying)   Pulse (!) 130   Temp 98.6 °F (37 °C) (Oral)   Resp 14   SpO2 (!) 87%     General Appearance:  Sedated, no distress   Head:    Normocephalic, without obvious abnormality, atraumatic   Eyes:       Throat:   Lips, tongue, gums normal   Neck:   Supple, symmetrical, trachea midline, no JVD   Lungs:     Clear to auscultation bilaterally, respirations unlabored   Chest Wall:    No tenderness or deformity    Heart:    Regular rate and rhythm, S1 and S2 normal, no murmur,no  rub or gallop   Abdomen:     Soft, nontender, bowel sounds active, no masses, no organomegaly    Extremities:   Extremities normal, atraumatic, no cyanosis or edema   Pulses:      Skin:   Skin is warm and dry,  no rashes or palpable lesions   Neurologic: Sedated      Lab Results (last 72 hours)       ** No results found for the last 72 hours. **          Data Review:                      Lab Results   Lab Value Date/Time    TROPONINT 52 (H) 2024 0646    TROPONINT 49 (H) 12/10/2024 1817    TROPONINT  "48 (H) 12/10/2024 7623               Invalid input(s): \"PROT\", \"LABALBU\"            No results found for: \"POCGLU\"        No past medical history on file.    Assessment:  Active Hospital Problems    Diagnosis  POA    **Pneumonia due to Mycoplasma pneumoniae [J15.7]  Yes      Resolved Hospital Problems   No resolved problems to display.       Plan:  Continue with comfort measures.  Measures in place.  Discussed with the nurse and family at bedside.    Marcelo Hdz MD  12/24/2024  11:58 EST   "

## 2024-12-24 NOTE — PROGRESS NOTES
[facility] Visit Report     Alanna Machuca  [id number]  12/24/2024     [facility]mission [facility]T [facility] Dx: yes     Reason for [facility] Admission:     Patient is a 74 y.o. female with a primary [facility] diagnosis of PNA. Admitted to [facility] for GIP for symptom management of pain, dyspnea, restlessness, anxiety. Droplet      PPS: 10%     Medications in 24 hours:  -IV dilaudid 0.5mg PRN x5 (one due soon)  -IV ativan 2mg PRN x5 (one due soon)     Recommendations:  Continue to monitor for signs of decline and provide comfort measures. Contact [facility] at [phone] with any questions or concerns and at TOD.      Assessment:  Patient is in bed on her left side with he eyes closed. She was moved her arms twice during my assessment, RR were 26 and family at bedside reported that she had reacted to her touch earlier today as well. It was only 3 hours since last medications. Discussed with facility RN two was bringing in medications and will CTM for increased dose need. Pt was very warm to touch today with palpable peripheral pulses.  Lungs are clear on ra. F/c patent with concentrated magdalena UOP and minimal b/s.       Collaboration:  Spoke with pts STEPHEN Seaman at the bedside with condition update and support provided. Training provided regarding assessment findings, disease progression, symptom management, recommendations and expected length of stay. Family v/u of pts condition and all questions were answered. Collaborated with [facility] RN and CCP about pts condition and recommendations.     Disposition:  Patient meets GIP criteria, requiring frequent administration and continued titration of parenteral medications to achieve and maintain symptom management. Patient appears to be unsafe for transport at this time, requiring increasing symptom management and frequent RN assessment. If patient were to stabilize she would require LTC placement due to increased daily care needs.  Will continue  [facility] RN visits to monitor for changes, assess needs and provide support.         Candida Hook RN  [facility] Health Visit Nurse  Scattered Bed Team

## 2024-12-24 NOTE — PLAN OF CARE
Goal Outcome Evaluation:     Patient has a PPS of 10%.  Responsive to repositioning.  Respirations, shallow and non labored.  As needed medications in place for comfort:  Dilaudid 0.5 mg x 1. Dilaudid 1 mg x 2, Ativan 2 mg x 3.  Supportive niece visiting this day.

## 2024-12-25 NOTE — PLAN OF CARE
Goal Outcome Evaluation:      Pt maintains PPS 10%; unresponsive. Premedicated prior to activity and turns. Tolerating turns well. Schroeder catheter in place. New PIV placed in RFA. PRNs: 1 mg dilaudid and 2 mg ativan. Plan of comfort ongoing.

## 2024-12-25 NOTE — PROGRESS NOTES
[facility] Visit Report     Alanna Machuca  [id number]  12/25/2024     Admission R/T [facility] Dx: yes     Reason for [facility] Admission:     Patient is a 74 y.o. female with a primary [facility] diagnosis of PNA. Admitted to [facility] for GIP for symptom management of pain, dyspnea, restlessness, anxiety. Droplet      PPS: 10%     Medications in 24 hours:  -IV dilaudid 1mg PRN x 7  -IV ativan 2mg PRN x7    Recommendations:  Continue to monitor for signs of decline and provide comfort measures. Contact [facility] at [phone] with any questions or concerns and at TOD.      Assessment:  Pt in bed unresponsive on her side. Lungs clear with calm shallow respirations. She is cold to touch today with dusky nailbeds. Palpable peripheral pulses. Hypoactive b/s and patent f/c with diminished UOP. Current medication regimen proving effective at this time.     Collaboration:  Call to STEPHEN Seaman, no answer and VM left with condition update and support provided. Training provided regarding assessment findings, disease progression, symptom management, recommendations and expected length of stay. Family v/u of pts condition and all questions were answered. Collaborated with [facility] RN and CCP about pts condition and recommendations.     Disposition:  Patient meets GIP criteria, requiring frequent administration and continued titration of parenteral medications to achieve and maintain symptom management. Patient appears to be unsafe for transport at this time, requiring increasing symptom management and frequent RN assessment. If patient were to stabilize she would require LTC placement due to increased daily care needs.  Will continue [facility] RN visits to monitor for changes, assess needs and provide support.         Candida Hook RN  [facility] Visit Nurse  Scattered Bed Team

## 2024-12-25 NOTE — PROGRESS NOTES
"    Name: Alanna Machuca ADMIT: 2024   : 1950  PCP: Provider, No Known    MRN: [id number] LOS: 5 days   AGE/SEX: 74 y.o. female  ROOM: 80/1     Subjective   Subjective   Pt unresponsive.       Objective   Objective   Vital Signs  Temp:  [97.3 °F (36.3 °C)-98.1 °F (36.7 °C)] 98.1 °F (36.7 °C)  Heart Rate:  [107-120] 120  Resp:  [16-28] 16  BP: (111-126)/(68-85) 111/68  SpO2:  [85 %-86 %] 85 %  on   ;   Device (Oxygen Therapy): room air  There is no height or weight on file to calculate BMI.  Physical Exam  Vitals and nursing note reviewed.   Constitutional:       Comments: Laying on side, mouth agape   HENT:      Head: Normocephalic.      Nose: Nose normal.      Mouth/Throat:      Mouth: Mucous membranes are dry.   Eyes:      General:         Right eye: No discharge.         Left eye: No discharge.   Cardiovascular:      Rate and Rhythm: Normal rate and regular rhythm.   Pulmonary:      Effort: Pulmonary effort is normal.      Breath sounds: Normal breath sounds.   Abdominal:      General: There is no distension.      Palpations: Abdomen is soft.      Comments: Hypoactive BS   Genitourinary:     Comments: Scant dark urine in bag  Musculoskeletal:         General: No swelling.      Comments: Feet warm   Skin:     General: Skin is warm and dry.      Capillary Refill: Capillary refill takes 2 to 3 seconds.      Coloration: Skin is not jaundiced.   Neurological:      Comments: Unresponsive to voice   Psychiatric:      Comments: Unable to assess       Results Review     I reviewed the patient's new clinical results.              No results found for: \"HGBA1C\", \"POCGLU\"    No radiology results for the last day    I have personally reviewed all medications:  Scheduled Medications  magic mouthwash oral suspension (mixture) (diphenhydrAMINE HCl - aluminum & magnesium hydroxide-simethicone - lidocaine viscous) solution 55 mL, 5 mL, Swish & Spit, Q6H  QUEtiapine, 200 mg, Oral, BID    Infusions   Diet  Diet: " Regular/House; Fluid Consistency: Thin (IDDSI 0)         Assessment/Plan     Active Hospital Problems    Diagnosis  POA    **Pneumonia due to Mycoplasma pneumoniae [J15.7]  Yes      Resolved Hospital Problems   No resolved problems to display.       73yo woman with schizophrenia who was sent to ER from PCP office with hypoxia. She was admitted with acute hypoxic respiratory failure due to Mycoplasma PNA and acute MARILYN PE. Admission complicated by dysphagia, aspiration pneumonia, newly noted subcarinal mass, and new diagnosis of systolic CHF (EF 30-35%). Pt was followed by Card and Thoracic Surg.     Despite aggressive therapies the pt failed to make any significant progress. Palliative Care discussed case with pt's guardian and on 12/16 decision was made to change goals of care to comfort. She was transferred to [facility] for focus on symptom management. Antibiotics and AC were stopped and palliative care order set initiated.    She has been flipped to B now and [facility] is following daily.    She is requiring multiple doses of IV Ativan and IV Dilaudid for symptom mgmt.    UOP low, hypoxic, peripheral perfusion intact.      No indication  for DVT prophylaxis.  DNR.  Discussed with hospice RN. No family present.      Wes Justice MD  [facility]  12/25/24  18:06 EST

## 2024-12-26 PROBLEM — I50.22 CHRONIC HFREF (HEART FAILURE WITH REDUCED EJECTION FRACTION): Status: ACTIVE | Noted: 2024-01-01

## 2024-12-26 PROBLEM — Z51.5 HOSPICE CARE PATIENT: Status: ACTIVE | Noted: 2024-01-01

## 2024-12-26 PROBLEM — G31.1 SENILE DEGENERATION OF BRAIN: Status: ACTIVE | Noted: 2024-01-01

## 2024-12-26 PROBLEM — R59.0 MEDIASTINAL LYMPHADENOPATHY: Status: ACTIVE | Noted: 2024-01-01

## 2024-12-26 NOTE — H&P
Palliative Care/Hospice Admit/Consult Note     Referring Provider: Wes Justice MD   Reason for Consultation: Hospice Care  Date of Admission:  12/20/2024    Patient Care Team:  Angel Farfan MD as PCP - General (Family Medicine)  Zen Anand MD as Attending Provider (Hospice and Palliative Medicine)    Chief complaint:  Pneumonia due to Mycoplasma pneumoniae     History of present illness:  The patient is a 74 y.o. female who presented to the ED 12/10/2024 via private vehicle from doctor's office with cough and shortness of breath since 12/10/2024.  Was found to be hypoxic at doctor's office today in the 80s, requiring supplemental O2 to get her into the 90s.  Patient has had a cough and no significant chest pain.  No reported fevers, no vomiting or diarrhea.  Caregiver states the patient has a remote history of CHF but not really anything since then.  Patient has a history of schizophrenia and dementia.     CT chest demonstrated pulmonary embolism in segmental left upper lobe pulmonary artery, coronal image 85. RV LV ratio is measured at 1. Extensive bilateral pulmonary infiltrates suggesting pneumonia, possibility of underlying lesion/neoplasm not excluded, clinical correlation and follow-up/further evaluation advised as indicated. 2. Subcarinal masslike density, could be evidence of neoplasm, possibly involving the esophagus. Bilateral hilar and mediastinal adenopathy.     Also, Mycoplasma pneumo by PCR. The patient was admitted and treatments initiated.    2D echo 12/11/2024:  Left ventricular ejection fraction appears to be 31 - 35%.  Left ventricular diastolic function was indeterminate. Estimated right ventricular systolic pressure from tricuspid regurgitation is normal. Cardiology saw the patient and reported not acute CHF.    Due to failure to demonstrate significant improvement, palliative consult obtained.  Palliative RN discussed with the legal guardian. Granted DNR CODE STATUS and  now ready to discontinue all antibiotics and medical management with transfer to [facility] and full comfort palliative care order set. The hospitalist reported that the patient is not decisional at baseline and is not thriving with full medical management. Hospice was asked to evaluate and the patient was discharged from acute care and readmitted as a hospice scattered bed patient 12/20/2024. I was asked to assume her care.     At the time of my evaluation, no family present at bedside. The patient was lying on her right side. The patient appears comfortable and was not awake and no ROS obtainable.    Review of Systems  Review of systems could not be obtained due to   patient sedation status.    Palliative Performance Scale  Palliative Performance Scale Score: 10%  [place] Symptom Assessment System Revised  Pain Score: no pain   ESAS Tiredness Score: Worst possible tiredness  ESAS Nausea Score: No nausea  ESAS Depression Score: No depression  ESAS Anxiety Score: No anxiety  ESAS Drowsiness Score: Worst possible drowsiness  ESAS Lack of Appetite Score: Worst lack of appetite  ESAS Wellbeing Score: Best wellbeing  ESAS Dyspnea Score: No shortness of breath  ESAS Other Problem Score: Best possible response  ESAS Source of Information: healthcare professional caregiver  ESAS Intervention: medicated/see MAR  ESAS Intervention Response: tolerated    History  Past Medical History:   Diagnosis Date    Osteoporosis, post-menopausal     Schizophrenia    ,   Past Surgical History:   Procedure Laterality Date    PLEURAL SCARIFICATION      SKIN GRAFT Left     Left flank to left arm   ,   Family History   Problem Relation Age of Onset    Alzheimer's disease Mother     Stroke Sister    , and   Social History     Socioeconomic History    Marital status: Single   Tobacco Use    Smoking status: Never    Smokeless tobacco: Never   Vaping Use    Vaping status: Never Used   Substance and Sexual Activity    Alcohol use: Not Currently     Drug use: Defer    Sexual activity: Not Currently     E-cigarette/Vaping    E-cigarette/Vaping Use Never User      E-cigarette/Vaping Substances    Nicotine No     THC No     CBD No     Flavoring No      E-cigarette/Vaping Devices    Disposable No     Pre-filled or Refillable Cartridge No     Refillable Tank No     Pre-filled Pod No       Allergy Patient has no known allergies.    Vital Signs   Temp:  [98.1 °F (36.7 °C)-98.2 °F (36.8 °C)] 98.2 °F (36.8 °C)  Heart Rate:  [113-120] 113  Resp:  [16] 16  BP: (107-111)/(68-71) 107/71  Device (Oxygen Therapy): room air SpO2:  [85 %-91 %] 91 %    Physical Exam:  General Appearance:   Not awake and appears in no acute distress lying on her right side, chronically ill-appearing elderly female   Head:    Normocephalic, without obvious abnormality, atraumatic   Eyes:            Lids and lashes normal, conjunctivae and sclerae normal, no icterus   Ears:    Ears appear intact with no abnormalities noted   Throat:   No oral lesions, oral mucosa somewhat dry with mouth slightly open    Neck:   No adenopathy, supple, trachea midline, no thyromegaly   Back:     No scoliosis present   Lungs:     Clear to auscultation with occasional scattered crackle, respirations diminished and not labored    Heart:    Regular rhythm and tachycardia    Breast Exam:    Deferred   Abdomen:     Soft and non-tender, non-distended   Genitalia:    Deferred   Extremities:  No edema, pale and no cyanosis    Pulses:  Radial pulses palpable and equal bilaterally   Skin:   No bleeding         Neurologic:  Not awake to test      Results Review:   I reviewed the patient's new clinical results.    Impression:      Pneumonia due to Mycoplasma pneumoniae    Hospice care patient    Pulmonary embolism subsegmental left upper lobe pulmonary artery    Schizophrenia    Subcarinal mass CT 12/10/2024    Chronic HFrEF (heart failure with reduced ejection fraction)    Senile degeneration of brain    Anemia of chronic  disease    Severe protein-calorie malnutrition      Plan:  I reviewed the patient's admission and previous medical records.  I reviewed records in Care Everywhere.  I reviewed with the patient's RN.  With medications previously administered, the patient appears comfortable.  The patient has been unable to take any p.o. medications and they will be discontinued.  The patient has required glycopyrrolate for some airway congestion.  The patient has required 2 doses of 1 mg IV Dilaudid, 6 doses yesterday, and 2 doses 2 mg IV Ativan, 6 doses yesterday, thus far today.  Medications will be continued and adjusted as needed for symptom management for comfort.  No attempts at resuscitation will be made.      Zen Anand MD  Hospice and Palliative Medicine  12/26/24  09:22 EST

## 2024-12-26 NOTE — PLAN OF CARE
Problem: Adult Inpatient Plan of Care  Goal: Plan of Care Review  Outcome: Progressing  Flowsheets (Taken 12/26/2024 0625)  Progress: declining  Outcome Evaluation: PPS 10%. Patient is premedicated prior to turns with Ativan 2 mg, Dilaudid 1 mg and Robinul 0.4 mg IV. No family at bedside. Schroeder cath and oral care done. Continue with plan of care.  Plan of Care Reviewed With: family   Goal Outcome Evaluation:  Plan of Care Reviewed With: family        Progress: declining  Outcome Evaluation: PPS 10%. Patient is premedicated prior to turns with Ativan 2 mg, Dilaudid 1 mg and Robinul 0.4 mg IV. No family at bedside. Schroeder cath and oral care done. Continue with plan of care.

## 2024-12-26 NOTE — PROGRESS NOTES
[facility] Visit Report     Alanna Machuca  [id number]  12/26/2024     Admission R/T [facility] Dx: yes     Reason for [facility] Admission:     Patient is a 74 y.o. female with a primary [facility] diagnosis of PNA. Admitted to [facility] for GIP for symptom management of pain, dyspnea, restlessness, anxiety and congestion. Droplet      PPS: 10%     Medications in 24 hours:  -IV dilaudid 1mg PRN x 7  -IV ativan 2mg PRN x7  -IV robinul 0.4mg PRN x2     Recommendations:  Continue to monitor for signs of decline and provide comfort measures. Contact [facility] at [phone] with any questions or concerns and at TOD.      Assessment:  Pt in bed unresponsive on her side. Lungs are congested today and robinul is being used PRN. Calm unlabored respirations on ra. Pt is warmer than yesterday with palpable peripheral pulses. She remains unresponsive and NPO requiring oral care and assistance with all ADLS. F/C patent with hypoactive b/s. Current regimen effective at this time with PRN robinul.     Collaboration:  Call to STEPHEN Seaman with condition update and support provided. Training provided regarding assessment findings, disease progression, symptom management, recommendations and expected length of stay. Family v/u of pts condition and all questions were answered. Collaborated with [facility] RN and CCP about pts condition and recommendations.     Disposition:  Patient meets GIP criteria, requiring frequent administration and continued titration of parenteral medications to achieve and maintain symptom management. Patient appears to be unsafe for transport at this time, requiring increasing symptom management and frequent RN assessment. If patient were to stabilize she would require LTC placement due to increased daily care needs.  Will continue [facility] RN visits to monitor for changes, assess needs and provide support.         Candida Hook, RN  [facility] Visit Nurse  Scattered  Bed Team

## 2024-12-27 NOTE — PROGRESS NOTES
Palliative Care/Hospice Follow Up Note       LOS: 7 days   Patient Care Team:  Angel Farfan MD as PCP - General (Family Medicine)  Zen Anand MD as Attending Provider (Hospice and Palliative Medicine)    Chief Complaint:  Pneumonia due to Mycoplasma pneumoniae     Interval History:     Patient Complaints: None  Patient Denies: None  History taken from: RN  Review of Systems:  As above.    Palliative Performance Scale  Palliative Performance Scale Score: 10%  [place] Symptom Assessment System Revised  Pain Score: no pain   ESAS Tiredness Score: 8  ESAS Nausea Score: No nausea  ESAS Depression Score: No depression  ESAS Anxiety Score: No anxiety  ESAS Drowsiness Score: 8  ESAS Lack of Appetite Score: Worst lack of appetite  ESAS Wellbeing Score: 6  ESAS Dyspnea Score: No shortness of breath  ESAS Other Problem Score: 2 (congestion)  ESAS Source of Information: healthcare professional caregiver  ESAS Intervention: medicated/see MAR  ESAS Intervention Response: tolerated    Vital Signs  Temp:  [97.5 °F (36.4 °C)-100.8 °F (38.2 °C)] 100.8 °F (38.2 °C)  Heart Rate:  [125-137] 137  Resp:  [16] 16  BP: (106-125)/(71-84) 125/84  Device (Oxygen Therapy): room air SpO2:  [77 %-84 %] 77 %    Physical Exam:  General Appearance:    Not awake and appears in no acute distress lying on her left side, chronically ill-appearing older appearing female   Throat:   No oral lesions, oral mucosa somewhat dry with mouth slightly open    Neck:   No adenopathy, supple, trachea midline   Lungs:     Clear to auscultation with mild scattered rhonchi and scattered inspiratory crackles, respirations with slight increase inspiratory effort and rate, slight decreased tidal volume breath     Heart:    Regular rhythm and tachycardia    Abdomen:     Soft and non-tender, non-distended   Extremities:   No edema, hands and ashen and evidence of cyanosis, dusky appearing fingers    Pulses:   Radial pulses palpable and equal  bilaterally          Results Review:     I reviewed the patient's new clinical results.    Medication Reviewed.    Assessment & Plan       Pneumonia due to Mycoplasma pneumoniae    Hospice care patient    Pulmonary embolism subsegmental left upper lobe pulmonary artery    Schizophrenia    Subcarinal mass CT 12/10/2024    Chronic HFrEF (heart failure with reduced ejection fraction)    Senile degeneration of brain    Anemia of chronic disease    Severe protein-calorie malnutrition      No family present at the time of my evaluation.  I reviewed with the patient's RN.  With medications previously administered, patient appears comfortable.  The patient has required glycopyrrolate for airway congestion.  The patient has required 3 doses of 1 mg IV Dilaudid, 6 doses yesterday, and 3 doses of 2 mg IV Ativan, 6 doses yesterday, thus far today.  Medications will be continued and adjusted as needed for symptom management for comfort.    Plan for disposition:  HAN Anand MD  Hospice and Palliative Medicine  12/27/24  11:10 EST

## 2024-12-27 NOTE — DISCHARGE SUMMARY
Discharge As      Date of Admisssion:  2024  Date of Death:  2024  Time of Death:  3:50 PM    Patient Care Team:  Angel Farfan MD as PCP - General (Family Medicine)  Zen Anand MD as Attending Provider (Hospice and Palliative Medicine)    Final Diagnosis:     Pneumonia due to Mycoplasma pneumoniae    Hospice care patient    Pulmonary embolism subsegmental left upper lobe pulmonary artery    Schizophrenia    Subcarinal mass CT 12/10/2024    Chronic HFrEF (heart failure with reduced ejection fraction)    Senile degeneration of brain    Anemia of chronic disease    Severe protein-calorie malnutrition      Hospital Course  Patient was a 74 y.o. female who presented to the ED 12/10/2024 via private vehicle from doctor's office with cough and shortness of breath since 12/10/2024.  Was found to be hypoxic at doctor's office in the 80s, requiring supplemental O2 to get her into the 90s.  Patient has had a cough and no significant chest pain.  Patient has a history of schizophrenia and dementia.      In the ED, CT chest demonstrated pulmonary embolism in segmental left upper lobe pulmonary artery, coronal image 85. RV LV ratio is measured at 1. Extensive bilateral pulmonary infiltrates suggesting pneumonia, possibility of underlying lesion/neoplasm not excluded, clinical correlation and follow-up/further evaluation advised as indicated. Subcarinal masslike density, could be evidence of neoplasm, possibly involving the esophagus. Bilateral hilar and mediastinal adenopathy.      Also, Mycoplasma pneumo by PCR. The patient was admitted and treatments initiated.     2D echo 2024:  Left ventricular ejection fraction appears to be 31 - 35%.  Left ventricular diastolic function was indeterminate. Estimated right ventricular systolic pressure from tricuspid regurgitation is normal. Cardiology saw the patient and reported not acute CHF.     Due to failure to demonstrate significant  improvement, palliative consult obtained.  Palliative RN discussed with the legal guardian. Granted DNR CODE STATUS and discontinued all antibiotics and medical management with transfer to [facility] and full comfort palliative care.  Please see my H&P dated 12/26/2024 at 0745 hrs.  When seen earlier today, 12/27/2024 at 1125 hrs., the patient had demonstrated some decline.  I reviewed with the patient's RN.  Subsequently, I was called that the patient's respirations ceased and no pulse palpable. No heart sounds audible. I pronounced the patient at 1550 hours.    Time: I spent 35 minutes on this discharge activity which included: face-to-face encounter with the patient, reviewing the data in the system, coordination of the care with the nursing staff as well as documentation and entering orders.       Zen Anand MD  Hospice and Palliative Medicine  12/27/24  17:20 EST

## 2024-12-27 NOTE — PLAN OF CARE
Goal Outcome Evaluation:      Pt maintains PPS 10%; unresponsive. Premedicated prior to activity and turns. Tolerating turns well. Schroeder catheter in place. New PIV placed in R hand. PRNs: 1 mg dilaudid, 2 mg ativan and 0.4 mg robinul. Plan of comfort ongoing.

## 2024-12-27 NOTE — PLAN OF CARE
Problem: Adult Inpatient Plan of Care  Goal: Plan of Care Review  Outcome: Not Progressing  Flowsheets (Taken 12/27/2024 0615)  Progress: declining  Outcome Evaluation: PPS 10%. Premed with Dilaudid 1mg, Ativan 2mg, and Robinul 0.4mg. F/C. No family at bedside.  Plan of Care Reviewed With: patient  Goal: Patient-Specific Goal (Individualized)  Outcome: Not Progressing  Goal: Absence of Hospital-Acquired Illness or Injury  Outcome: Not Progressing  Intervention: Identify and Manage Fall Risk  Description: Perform standard risk assessment on admission using a validated tool or comprehensive approach appropriate to the patient; reassess fall risk frequently, with change in status or transfer to another level of care.  Communicate risk to interprofessional healthcare team; ensure fall risk visible cue.  Determine need for increased observation, equipment and environmental modification, as well as use of supportive, nonskid footwear.  Adjust safety measures to individual needs and identified risk factors.  Reinforce the importance of active participation with fall risk prevention, safety, and physical activity with the patient and family.  Perform regular intentional rounding to assess need for position change, pain assessment and personal needs, including assistance with toileting.  Recent Flowsheet Documentation  Taken 12/27/2024 0453 by Corrie Tracey RN  Safety Promotion/Fall Prevention:   safety round/check completed   room organization consistent  Taken 12/27/2024 0232 by Corrie Tracey, RN  Safety Promotion/Fall Prevention:   safety round/check completed   room organization consistent  Taken 12/27/2024 0021 by Corrie Tracey, RN  Safety Promotion/Fall Prevention:   safety round/check completed   room organization consistent  Taken 12/26/2024 2229 by Corrie Tracey, RN  Safety Promotion/Fall Prevention:   safety round/check completed   room organization consistent  Taken 12/26/2024 2028 by Kyung  Corrie BURGOS RN  Safety Promotion/Fall Prevention:   safety round/check completed   room organization consistent   fall prevention program maintained   clutter free environment maintained   assistive device/personal items within reach   activity supervised  Intervention: Prevent and Manage VTE (Venous Thromboembolism) Risk  Description: Assess for VTE (venous thromboembolism) risk.  Promote early mobilization; encourage both active and passive leg exercises, if unable to ambulate.  Initiate and maintain compression or other therapy, as indicated, based on identified risk in accordance with organizational protocol and provider order.  Recognize the patient's individual risk for bleeding before initiating pharmacologic thromboprophylaxis.  Recent Flowsheet Documentation  Taken 12/26/2024 2028 by Corrie Tracey RN  VTE Prevention/Management: (palliative) SCDs (sequential compression devices) off  Goal: Optimal Comfort and Wellbeing  Outcome: Not Progressing  Intervention: Monitor Pain and Promote Comfort  Description: Assess pain level, treatment efficacy and patient response at regular intervals using a consistent pain scale.  Consider the presence and impact of preexisting chronic pain.  Encourage patient and caregiver involvement in pain assessment, interventions and safety measures.  Promote activity; balance with sleep and rest to enhance healing.  Recent Flowsheet Documentation  Taken 12/27/2024 0453 by Corrie Tracey RN  Pain Management Interventions:   premedicated for activity   pain medication given  Taken 12/27/2024 0021 by Corrie Tracey RN  Pain Management Interventions:   premedicated for activity   pain medication given  Taken 12/26/2024 2028 by Corrie Tracey RN  Pain Management Interventions:   premedicated for activity   pain medication given  Intervention: Provide Person-Centered Care  Description: Use a family-focused approach to care; encourage support system presence and  participation.  Develop trust and rapport by proactively providing information, encouraging questions, addressing concerns and offering reassurance.  Acknowledge emotional response to hospitalization.  Recognize and utilize personal coping strategies and strengths; develop goals via shared decision-making.  Honor spiritual and cultural preferences.  Recent Flowsheet Documentation  Taken 12/26/2024 2028 by Corrie Tracey RN  Trust Relationship/Rapport: care explained  Goal: Readiness for Transition of Care  Outcome: Not Progressing     Problem: Palliative Care  Goal: Enhanced Quality of Life  Outcome: Not Progressing  Intervention: Maximize Comfort  Description: Assess pain and acceptable level of comfort to individualize pain management plan.  Offer preferred nonpharmacologic and pharmacologic techniques to maximize comfort, relieve pain and manage agitation.  Manage medication-induced effects, such as constipation, diarrhea, nausea, vomiting and pruritus to maximize comfort.  Prevent or manage oral and gastrointestinal symptoms.  Offer food and fluids based on patient's preference and tolerance.  Promote complementary therapy, such as music, pet therapy, massage, meditation or aromatherapy.  Evaluate for breathlessness; provide supportive relief (e.g., positioning, breathing exercises, secretion clearance, medication).  Promote strategies for sleep and rest.  Monitor skin integrity; change position regularly and provide skin care.  Recent Flowsheet Documentation  Taken 12/27/2024 0453 by Corrie Tracey RN  Pain Management Interventions:   premedicated for activity   pain medication given  Taken 12/27/2024 0021 by Corrie Tracey RN  Pain Management Interventions:   premedicated for activity   pain medication given  Taken 12/26/2024 2028 by Corrie Tracey RN  Pain Management Interventions:   premedicated for activity   pain medication given     Problem: Skin Injury Risk Increased  Goal: Skin Health  and Integrity  Outcome: Not Progressing  Intervention: Optimize Skin Protection  Description: Perform a full pressure injury risk assessment, as indicated by screening, upon admission to care unit.  Reassess skin (full inspection and injury risk, including skin temperature, consistency and color) frequently (e.g., scheduled interval, with change in condition) to provide optimal early detection and prevention.  Maintain adequate tissue perfusion (e.g., encourage fluid balance; avoid crossing legs, constrictive clothing or devices) to promote tissue oxygenation.  Maintain head of bed at lowest degree of elevation tolerated, considering medical condition and other restrictions. Use positioning supports to prevent sliding and friction. Consider low friction textiles.  Avoid positioning onto an area that remains reddened or on bony prominences.  Minimize incontinence and moisture (e.g., toileting schedule; moisture-wicking pad, diaper or incontinence collection device; skin moisture barrier).  Cleanse skin promptly and gently, when soiled, utilizing a pH-balanced cleanser.  Relieve and redistribute pressure (e.g., scheduled position changes, weight shifts, use of support surface, medical device repositioning, protective dressing application, use of positioning device, microclimate control, use of pressure-injury-monitor  Encourage increased activity, such as sitting in a chair at the bedside or early mobilization, when able to tolerate. Avoid prolonged sitting.  Recent Flowsheet Documentation  Taken 12/26/2024 2028 by Corrie Tracey RN  Activity Management: bedrest  Pressure Reduction Techniques:   weight shift assistance provided   pressure points protected   positioned off wounds   heels elevated off bed     Problem: Fall Injury Risk  Goal: Absence of Fall and Fall-Related Injury  Outcome: Not Progressing  Intervention: Identify and Manage Contributors  Description: Develop a fall prevention plan, considering  patient-centered interventions and family/caregiver involvement; identify and address patient's facilitators and barriers.  Provide reorientation, appropriate sensory stimulation and routines with changes in mental status to decrease risk of fall.  Promote use of personal vision and auditory aids.  Assess assistance level required for safe and effective self-care; provide support as needed, such as toileting and mobilization. For age 65 and older, implement timed toileting with assistance.  Encourage physical activity, such as performance of mobility and self-care at highest level of patient ability, multicomponent exercise program and provision of appropriate assistive devices.  If fall occurs, assess the severity of injury; implement fall injury protocol. Determine the cause and revise fall injury prevention plan.  Regularly review and advocate for medication adjustment to decrease fall risk; consider administration times, polypharmacy and age.  Balance adequate pain management with potential for oversedation.  Recent Flowsheet Documentation  Taken 12/26/2024 2028 by Corrie Tracey RN  Medication Review/Management: medications reviewed  Intervention: Promote Injury-Free Environment  Description: Provide a safe, barrier-free environment that encourages independent activity.  Keep care area uncluttered and well-lighted.  Determine need for increased observation or monitoring.  Avoid use of devices that minimize mobility, such as restraints or indwelling urinary catheter.  Recent Flowsheet Documentation  Taken 12/27/2024 0453 by Corrie Tracey RN  Safety Promotion/Fall Prevention:   safety round/check completed   room organization consistent  Taken 12/27/2024 0232 by Corrie Tracey, RN  Safety Promotion/Fall Prevention:   safety round/check completed   room organization consistent  Taken 12/27/2024 0021 by Corrie Tracey, RN  Safety Promotion/Fall Prevention:   safety round/check completed   room  organization consistent  Taken 12/26/2024 2229 by Corrie Tracey RN  Safety Promotion/Fall Prevention:   safety round/check completed   room organization consistent  Taken 12/26/2024 2028 by Corrie Tracey, RN  Safety Promotion/Fall Prevention:   safety round/check completed   room organization consistent   fall prevention program maintained   clutter free environment maintained   assistive device/personal items within reach   activity supervised   Goal Outcome Evaluation:  Plan of Care Reviewed With: patient        Progress: declining  Outcome Evaluation: PPS 10%. Premed with Dilaudid 1mg, Ativan 2mg, and Robinul 0.4mg. F/C. No family at bedside.

## 2024-12-31 NOTE — PROGRESS NOTES
Case Management Discharge Note      Final Note: The patient  on 24 @ 15:52. BCarole lisa, RN CCP         Selected Continued Care - Discharged on 2024 Admission date: 2024 - Discharge disposition:       Destination Coordination complete.      Service Provider Services Address Phone Fax Patient [facility]    [facility] [address], [place] [id number] [phone] --              Durable Medical Equipment    No services have been selected for the patient.                Dialysis/Infusion    No services have been selected for the patient.                Home Medical Care    No services have been selected for the patient.                Therapy    No services have been selected for the patient.                Community Resources    No services have been selected for the patient.                Community & DME    No services have been selected for the patient.                         Final Discharge Disposition Code: 20 -